# Patient Record
Sex: MALE | Race: WHITE
[De-identification: names, ages, dates, MRNs, and addresses within clinical notes are randomized per-mention and may not be internally consistent; named-entity substitution may affect disease eponyms.]

---

## 2017-08-28 ENCOUNTER — HOSPITAL ENCOUNTER (OUTPATIENT)
Dept: HOSPITAL 53 - M LAB REF | Age: 47
End: 2017-08-28
Attending: PHYSICIAN ASSISTANT
Payer: COMMERCIAL

## 2017-08-28 DIAGNOSIS — L02.412: Primary | ICD-10-CM

## 2017-09-09 ENCOUNTER — HOSPITAL ENCOUNTER (EMERGENCY)
Dept: HOSPITAL 53 - M ED | Age: 47
Discharge: HOME | End: 2017-09-09
Payer: OTHER GOVERNMENT

## 2017-09-09 VITALS — SYSTOLIC BLOOD PRESSURE: 101 MMHG | DIASTOLIC BLOOD PRESSURE: 52 MMHG

## 2017-09-09 VITALS — HEIGHT: 72 IN | WEIGHT: 315 LBS | BODY MASS INDEX: 42.66 KG/M2

## 2017-09-09 VITALS — OXYGEN SATURATION: 99 %

## 2017-09-09 DIAGNOSIS — J45.901: Primary | ICD-10-CM

## 2017-09-09 LAB
ANION GAP SERPL CALC-SCNC: 7 MEQ/L (ref 8–16)
BASOPHILS # BLD AUTO: 0 K/MM3 (ref 0–0.2)
BASOPHILS NFR BLD AUTO: 0.6 % (ref 0–1)
BUN SERPL-MCNC: 16 MG/DL (ref 7–18)
CALCIUM SERPL-MCNC: 8.3 MG/DL (ref 8.5–10.1)
CHLORIDE SERPL-SCNC: 111 MEQ/L (ref 98–107)
CO2 SERPL-SCNC: 27 MEQ/L (ref 21–32)
CREAT SERPL-MCNC: 1.06 MG/DL (ref 0.7–1.3)
EOSINOPHIL # BLD AUTO: 0.3 K/MM3 (ref 0–0.5)
EOSINOPHIL NFR BLD AUTO: 5.4 % (ref 0–3)
ERYTHROCYTE [DISTWIDTH] IN BLOOD BY AUTOMATED COUNT: 13.3 % (ref 11.5–14.5)
GFR SERPL CREATININE-BSD FRML MDRD: > 60 ML/MIN/{1.73_M2} (ref 60–?)
GLUCOSE SERPL-MCNC: 84 MG/DL (ref 70–105)
LARGE UNSTAINED CELL #: 0.2 K/MM3 (ref 0–0.4)
LARGE UNSTAINED CELL %: 2.6 % (ref 0–4)
LYMPHOCYTES # BLD AUTO: 1.4 K/MM3 (ref 1.5–4.5)
LYMPHOCYTES NFR BLD AUTO: 21 % (ref 24–44)
MCH RBC QN AUTO: 30.4 PG (ref 27–33)
MCHC RBC AUTO-ENTMCNC: 32.1 G/DL (ref 32–36.5)
MCV RBC AUTO: 94.4 FL (ref 80–96)
MONOCYTES # BLD AUTO: 0.4 K/MM3 (ref 0–0.8)
MONOCYTES NFR BLD AUTO: 6.4 % (ref 0–5)
NEUTROPHILS # BLD AUTO: 3.7 K/MM3 (ref 1.8–7.7)
NEUTROPHILS NFR BLD AUTO: 64.1 % (ref 36–66)
PLATELET # BLD AUTO: 271 K/MM3 (ref 150–450)
POTASSIUM SERPL-SCNC: 3.8 MEQ/L (ref 3.5–5.1)
SODIUM SERPL-SCNC: 145 MEQ/L (ref 136–145)
WBC # BLD AUTO: 5.7 K/MM3 (ref 4–10)

## 2017-09-09 PROCEDURE — 94640 AIRWAY INHALATION TREATMENT: CPT

## 2017-09-09 PROCEDURE — 96374 THER/PROPH/DIAG INJ IV PUSH: CPT

## 2017-09-09 PROCEDURE — 93041 RHYTHM ECG TRACING: CPT

## 2017-09-09 PROCEDURE — 99285 EMERGENCY DEPT VISIT HI MDM: CPT

## 2017-09-09 PROCEDURE — 85025 COMPLETE CBC W/AUTO DIFF WBC: CPT

## 2017-09-09 PROCEDURE — 94760 N-INVAS EAR/PLS OXIMETRY 1: CPT

## 2017-09-09 PROCEDURE — 71020: CPT

## 2017-09-09 PROCEDURE — 80048 BASIC METABOLIC PNL TOTAL CA: CPT

## 2017-09-09 RX ADMIN — IPRATROPIUM BROMIDE AND ALBUTEROL SULFATE PRN ML: .5; 3 SOLUTION RESPIRATORY (INHALATION) at 12:24

## 2017-09-09 RX ADMIN — IPRATROPIUM BROMIDE AND ALBUTEROL SULFATE PRN ML: .5; 3 SOLUTION RESPIRATORY (INHALATION) at 12:13

## 2017-09-09 RX ADMIN — IPRATROPIUM BROMIDE AND ALBUTEROL SULFATE PRN ML: .5; 3 SOLUTION RESPIRATORY (INHALATION) at 13:15

## 2017-09-09 NOTE — REP
Clinical:  Dyspnea and cough .

 

Comparison: 06/16/2017 .

 

Technique:  PA and lateral.

 

Findings:

The mediastinum and cardiac silhouette are normal.  The lung fields are clear and

without acute consolidation, effusion, or pneumothorax.  The skeletal structures

are intact and normal.

 

Impression:

1.   No acute cardiopulmonary process.

 

 

Signed by

Sami Hartman MD 09/09/2017 12:13 P

## 2017-09-19 ENCOUNTER — HOSPITAL ENCOUNTER (OUTPATIENT)
Dept: HOSPITAL 53 - M WUC | Age: 47
End: 2017-09-19
Attending: PHYSICIAN ASSISTANT
Payer: MEDICARE

## 2017-09-19 DIAGNOSIS — D50.9: ICD-10-CM

## 2017-09-19 DIAGNOSIS — R42: Primary | ICD-10-CM

## 2017-09-19 DIAGNOSIS — Z79.899: ICD-10-CM

## 2017-09-19 LAB
ALBUMIN SERPL BCG-MCNC: 3.3 GM/DL (ref 3.2–5.2)
ALBUMIN/GLOB SERPL: 1.22 {RATIO} (ref 1–1.93)
ALP SERPL-CCNC: 69 U/L (ref 45–117)
ALT SERPL W P-5'-P-CCNC: 15 U/L (ref 12–78)
ANION GAP SERPL CALC-SCNC: 7 MEQ/L (ref 8–16)
AST SERPL-CCNC: 17 U/L (ref 15–37)
BASOPHILS # BLD AUTO: 0 K/MM3 (ref 0–0.2)
BASOPHILS NFR BLD AUTO: 0.6 % (ref 0–1)
BILIRUB SERPL-MCNC: 0.4 MG/DL (ref 0.2–1)
BUN SERPL-MCNC: 22 MG/DL (ref 7–18)
CALCIUM SERPL-MCNC: 8.7 MG/DL (ref 8.5–10.1)
CHLORIDE SERPL-SCNC: 110 MEQ/L (ref 98–107)
CO2 SERPL-SCNC: 27 MEQ/L (ref 21–32)
CREAT SERPL-MCNC: 1.26 MG/DL (ref 0.7–1.3)
EOSINOPHIL # BLD AUTO: 0.2 K/MM3 (ref 0–0.5)
EOSINOPHIL NFR BLD AUTO: 4.1 % (ref 0–3)
ERYTHROCYTE [DISTWIDTH] IN BLOOD BY AUTOMATED COUNT: 13 % (ref 11.5–14.5)
FERRITIN SERPL-MCNC: 28 NG/ML (ref 26–388)
GFR SERPL CREATININE-BSD FRML MDRD: > 60 ML/MIN/{1.73_M2} (ref 60–?)
GLUCOSE SERPL-MCNC: 73 MG/DL (ref 70–105)
LYMPHOCYTES # BLD AUTO: 1.6 K/MM3 (ref 1.5–4.5)
LYMPHOCYTES NFR BLD AUTO: 27.7 % (ref 24–44)
MCH RBC QN AUTO: 31.1 PG (ref 27–33)
MCHC RBC AUTO-ENTMCNC: 33.9 G/DL (ref 32–36.5)
MCV RBC AUTO: 92 FL (ref 80–96)
MONOCYTES # BLD AUTO: 0.4 K/MM3 (ref 0–0.8)
MONOCYTES NFR BLD AUTO: 6.4 % (ref 0–5)
NEUTROPHILS # BLD AUTO: 3.3 K/MM3 (ref 1.8–7.7)
NEUTROPHILS NFR BLD AUTO: 58.9 % (ref 36–66)
PLATELET # BLD AUTO: 315 K/MM3 (ref 150–450)
POTASSIUM SERPL-SCNC: 4.8 MEQ/L (ref 3.5–5.1)
PROT SERPL-MCNC: 6 GM/DL (ref 6.4–8.2)
SODIUM SERPL-SCNC: 144 MEQ/L (ref 136–145)
T4 SERPL-MCNC: 5.6 UG/DL (ref 4.5–12)
WBC # BLD AUTO: 5.6 K/MM3 (ref 4–10)

## 2017-09-22 ENCOUNTER — HOSPITAL ENCOUNTER (OUTPATIENT)
Dept: HOSPITAL 53 - M RAD | Age: 47
End: 2017-09-22
Attending: PHYSICIAN ASSISTANT
Payer: MEDICARE

## 2017-09-22 DIAGNOSIS — E03.9: Primary | ICD-10-CM

## 2017-09-22 NOTE — REP
THYROID ULTRASOUND:

 

Real-time sonographic evaluation of the thyroid performed.  Right lobe measures

5.2 x 1.9 x 2.6 cm and left lobe 4.7 x 1.6 x 1.6 cm.  Echotexture is diffusely

heterogeneous.  A solid nodule in the mid right lobe measures 6 x 7 x 5 mm.  No

other cystic or solid nodule is seen.

 

 

Signed by

Quoc Contreras MD 09/22/2017 02:24 P

## 2017-10-19 ENCOUNTER — HOSPITAL ENCOUNTER (OUTPATIENT)
Dept: HOSPITAL 53 - M WUC | Age: 47
End: 2017-10-19
Attending: PHYSICIAN ASSISTANT
Payer: MEDICARE

## 2017-10-19 DIAGNOSIS — E03.9: Primary | ICD-10-CM

## 2017-10-19 LAB
T3RU NFR SERPL: 32 % (ref 33–40)
T4 SERPL-MCNC: 7.4 UG/DL (ref 4.5–12)

## 2017-12-18 ENCOUNTER — HOSPITAL ENCOUNTER (EMERGENCY)
Dept: HOSPITAL 53 - M ED | Age: 47
Discharge: HOME | End: 2017-12-18
Payer: OTHER GOVERNMENT

## 2017-12-18 ENCOUNTER — HOSPITAL ENCOUNTER (OUTPATIENT)
Dept: HOSPITAL 53 - M LAB | Age: 47
End: 2017-12-18
Attending: NURSE PRACTITIONER
Payer: MEDICAID

## 2017-12-18 VITALS — WEIGHT: 315 LBS | BODY MASS INDEX: 44.1 KG/M2 | HEIGHT: 71 IN

## 2017-12-18 VITALS — DIASTOLIC BLOOD PRESSURE: 67 MMHG | SYSTOLIC BLOOD PRESSURE: 123 MMHG

## 2017-12-18 DIAGNOSIS — M54.5: ICD-10-CM

## 2017-12-18 DIAGNOSIS — Z98.84: ICD-10-CM

## 2017-12-18 DIAGNOSIS — Z79.51: ICD-10-CM

## 2017-12-18 DIAGNOSIS — Z79.899: ICD-10-CM

## 2017-12-18 DIAGNOSIS — E03.9: ICD-10-CM

## 2017-12-18 DIAGNOSIS — E03.9: Primary | ICD-10-CM

## 2017-12-18 DIAGNOSIS — J45.901: Primary | ICD-10-CM

## 2017-12-18 LAB
ANION GAP SERPL CALC-SCNC: 5 MEQ/L (ref 8–16)
BASOPHILS # BLD AUTO: 0 10^3/UL (ref 0–0.2)
BASOPHILS NFR BLD AUTO: 0.6 % (ref 0–1)
BUN SERPL-MCNC: 24 MG/DL (ref 7–18)
CALCIUM SERPL-MCNC: 8.4 MG/DL (ref 8.5–10.1)
CHLORIDE SERPL-SCNC: 109 MEQ/L (ref 98–107)
CO2 SERPL-SCNC: 29 MEQ/L (ref 21–32)
CREAT SERPL-MCNC: 0.94 MG/DL (ref 0.7–1.3)
EOSINOPHIL # BLD AUTO: 0.1 10^3/UL (ref 0–0.5)
EOSINOPHIL NFR BLD AUTO: 2.3 % (ref 0–3)
ERYTHROCYTE [DISTWIDTH] IN BLOOD BY AUTOMATED COUNT: 13.1 % (ref 11.5–14.5)
GFR SERPL CREATININE-BSD FRML MDRD: > 60 ML/MIN/{1.73_M2} (ref 60–?)
GLUCOSE SERPL-MCNC: 96 MG/DL (ref 70–105)
IMM GRANULOCYTES NFR BLD: 0.4 % (ref 0–0)
LYMPHOCYTES # BLD AUTO: 1.4 10^3/UL (ref 1.5–4.5)
LYMPHOCYTES NFR BLD AUTO: 28 % (ref 24–44)
MCH RBC QN AUTO: 30 PG (ref 27–33)
MCHC RBC AUTO-ENTMCNC: 31.9 G/DL (ref 32–36.5)
MCV RBC AUTO: 93.8 FL (ref 80–96)
MONOCYTES # BLD AUTO: 0.3 10^3/UL (ref 0–0.8)
MONOCYTES NFR BLD AUTO: 5.8 % (ref 0–5)
NEUTROPHILS # BLD AUTO: 3.1 10^3/UL (ref 1.8–7.7)
NEUTROPHILS NFR BLD AUTO: 62.9 % (ref 36–66)
NRBC BLD AUTO-RTO: 0 % (ref 0–0)
PLATELET # BLD AUTO: 267 10^3/UL (ref 150–450)
POTASSIUM SERPL-SCNC: 4 MEQ/L (ref 3.5–5.1)
SODIUM SERPL-SCNC: 143 MEQ/L (ref 136–145)
T4 FREE SERPL-MCNC: 1.03 NG/DL (ref 0.76–1.46)
WBC # BLD AUTO: 4.9 10^3/UL (ref 4–10)

## 2018-04-24 ENCOUNTER — HOSPITAL ENCOUNTER (OUTPATIENT)
Dept: HOSPITAL 53 - M WUC | Age: 48
End: 2018-04-24
Attending: INTERNAL MEDICINE
Payer: OTHER GOVERNMENT

## 2018-04-24 DIAGNOSIS — J45.40: Primary | ICD-10-CM

## 2018-04-24 LAB
BASO #: 0 10^3/UL (ref 0–0.2)
BASO %: 0.4 % (ref 0–1)
EOS #: 0.2 10^3/UL (ref 0–0.5)
EOSINOPHIL NFR BLD AUTO: 3.6 % (ref 0–3)
HEMATOCRIT: 41.3 % (ref 42–52)
HEMOGLOBIN: 13.3 G/DL (ref 13.5–17.5)
IMMATURE GRANULOCYTE %: 0 % (ref 0–3)
IMMUNOGLOBULIN E: 20.3 IU/ML (ref ?–100)
LYMPH #: 1.7 10^3/UL (ref 1.5–4.5)
LYMPH %: 33.7 % (ref 24–44)
MEAN CORPUSCULAR HEMOGLOBIN: 30 PG (ref 27–33)
MEAN CORPUSCULAR HGB CONC: 32.2 G/DL (ref 32–36.5)
MEAN CORPUSCULAR VOLUME: 93 FL (ref 80–96)
MONO #: 0.4 10^3/UL (ref 0–0.8)
MONO %: 7.9 % (ref 0–5)
NEUTROPHILS #: 2.7 10^3/UL (ref 1.8–7.7)
NEUTROPHILS %: 54.4 % (ref 36–66)
NRBC BLD AUTO-RTO: 0 % (ref 0–0)
PLATELET COUNT, AUTOMATED: 260 10^3/UL (ref 150–450)
RED BLOOD COUNT: 4.44 10^6/UL (ref 4.3–6.1)
RED CELL DISTRIBUTION WIDTH: 13.3 % (ref 11.5–14.5)
WHITE BLOOD COUNT: 5 10^3/UL (ref 4–10)

## 2018-04-24 PROCEDURE — 82785 ASSAY OF IGE: CPT

## 2018-10-31 ENCOUNTER — HOSPITAL ENCOUNTER (EMERGENCY)
Dept: HOSPITAL 53 - M ED | Age: 48
Discharge: HOME | End: 2018-10-31
Payer: MEDICARE

## 2018-10-31 DIAGNOSIS — Z98.0: ICD-10-CM

## 2018-10-31 DIAGNOSIS — Z87.891: ICD-10-CM

## 2018-10-31 DIAGNOSIS — Z86.73: ICD-10-CM

## 2018-10-31 DIAGNOSIS — Z87.01: ICD-10-CM

## 2018-10-31 DIAGNOSIS — J45.901: Primary | ICD-10-CM

## 2018-10-31 LAB
VENOUS BASE EXCESS: 0.2 (ref -2–2)
VENOUS HCO3: 25.9 MEQ/L (ref 23–27)
VENOUS O2 SATURATION: 82.6 % (ref 60–80)
VENOUS PARTIAL PRESSURE CO2: 45.8 MMHG (ref 38–50)
VENOUS PARTIAL PRESSURE O2: 46.4 MMHG (ref 30–50)
VENOUS PH: 7.37 UNITS (ref 7.33–7.43)
VENOUS STANDARD HCO3: 24.3 MEQ/L
VENOUS TOTAL CO2: 27.3 MEQ/L (ref 24–28)

## 2018-10-31 RX ADMIN — IPRATROPIUM BROMIDE AND ALBUTEROL SULFATE 1 ML: .5; 3 SOLUTION RESPIRATORY (INHALATION) at 19:01

## 2018-10-31 RX ADMIN — IPRATROPIUM BROMIDE AND ALBUTEROL SULFATE 1 ML: .5; 3 SOLUTION RESPIRATORY (INHALATION) at 19:35

## 2018-10-31 RX ADMIN — METHYLPREDNISOLONE SODIUM SUCCINATE 1 MG: 125 INJECTION, POWDER, FOR SOLUTION INTRAMUSCULAR; INTRAVENOUS at 19:38

## 2018-10-31 RX ADMIN — IPRATROPIUM BROMIDE AND ALBUTEROL SULFATE 1 ML: .5; 3 SOLUTION RESPIRATORY (INHALATION) at 18:50

## 2020-10-28 ENCOUNTER — HOSPITAL ENCOUNTER (OUTPATIENT)
Dept: HOSPITAL 53 - M LAB REF | Age: 50
End: 2020-10-28
Attending: PHYSICIAN ASSISTANT
Payer: OTHER GOVERNMENT

## 2020-10-28 DIAGNOSIS — J45.50: Primary | ICD-10-CM

## 2020-10-28 LAB
BASOPHILS # BLD AUTO: 0 10^3/UL (ref 0–0.2)
BASOPHILS NFR BLD AUTO: 0.5 % (ref 0–1)
EOSINOPHIL # BLD AUTO: 0.1 10^3/UL (ref 0–0.5)
EOSINOPHIL NFR BLD AUTO: 1.4 % (ref 0–3)
HCT VFR BLD AUTO: 41.4 % (ref 42–52)
HGB BLD-MCNC: 12.9 G/DL (ref 13.5–17.5)
LYMPHOCYTES # BLD AUTO: 2.8 10^3/UL (ref 1.5–5)
LYMPHOCYTES NFR BLD AUTO: 43.5 % (ref 24–44)
MCH RBC QN AUTO: 28.9 PG (ref 27–33)
MCHC RBC AUTO-ENTMCNC: 31.2 G/DL (ref 32–36.5)
MCV RBC AUTO: 92.8 FL (ref 80–96)
MONOCYTES # BLD AUTO: 0.5 10^3/UL (ref 0–0.8)
MONOCYTES NFR BLD AUTO: 7.7 % (ref 0–5)
NEUTROPHILS # BLD AUTO: 3 10^3/UL (ref 1.5–8.5)
NEUTROPHILS NFR BLD AUTO: 46.6 % (ref 36–66)
PLATELET # BLD AUTO: 269 10^3/UL (ref 150–450)
RBC # BLD AUTO: 4.46 10^6/UL (ref 4.3–6.1)
WBC # BLD AUTO: 6.5 10^3/UL (ref 4–10)

## 2020-10-30 ENCOUNTER — HOSPITAL ENCOUNTER (OUTPATIENT)
Dept: HOSPITAL 53 - M LAB | Age: 50
End: 2020-10-30
Attending: PHYSICIAN ASSISTANT
Payer: OTHER GOVERNMENT

## 2020-10-30 DIAGNOSIS — J45.51: Primary | ICD-10-CM

## 2020-10-30 NOTE — REP
INDICATION:

SEVERE PERSISTENT ASTHMA W/ACUTE EXACERBATION.



COMPARISON:

08/31/2018.  The latest prior.



FINDINGS:

The superior mediastinal structures are midline.  The cardiac silhouette is

unremarkable in size, shape, and position.  The diaphragmatic surfaces of the lungs

are regular, and the costophrenic angles are clear.  The pulmonary fields are clear.

The imaged osseous structures are intact.





IMPRESSION:

There is no acute cardiopulmonary disease.





<Electronically signed by Jerson Andrade > 10/30/20 [id number]

## 2020-11-01 LAB
A FUMIGATUS IGE QN: < 0.1 KU/L
ANNUAL BLUE GRASS IGE QN: < 0.1 KU/L
C HERBARUM IGE QN: < 0.1 KU/L
CAT DANDER IGE QN: 0.74 KU/L
COMMON RAGWEED IGE QN: < 0.1 KU/L
ENGL PLANTAIN IGE QN: < 0.1 KU/L
MT JUNIPER IGE QN: < 0.1 KU/L
RED OAK IGE QN: < 0.1 KU/L
WATER HEMP IGE QN: < 0.1 KU/L
WHITE ASH IGE QN: < 0.1 KU/L
WHITE ELM IGE QN: < 0.1 KU/L
WHITE HICKORY IGE QN: < 0.1 KU/L

## 2021-05-06 ENCOUNTER — HOSPITAL ENCOUNTER (OUTPATIENT)
Dept: HOSPITAL 53 - M LAB REF | Age: 51
End: 2021-05-06
Attending: INTERNAL MEDICINE
Payer: OTHER GOVERNMENT

## 2021-05-06 DIAGNOSIS — J45.50: Primary | ICD-10-CM

## 2021-05-24 ENCOUNTER — HOSPITAL ENCOUNTER (OUTPATIENT)
Dept: HOSPITAL 53 - M RAD | Age: 51
End: 2021-05-24
Attending: INTERNAL MEDICINE
Payer: MEDICARE

## 2021-05-24 DIAGNOSIS — J45.50: Primary | ICD-10-CM

## 2021-05-24 DIAGNOSIS — K76.89: ICD-10-CM

## 2021-05-24 NOTE — REPVR
PROCEDURE INFORMATION: 

Exam: CT Chest Without Contrast; Diagnostic 

Exam date and time: 5/24/2021 3:31 PM 

Age: 50 years old 

Clinical indication: Other: Asthma 



TECHNIQUE: 

Imaging protocol: Diagnostic computed tomography of the chest without contrast. 

Axial, coronal and sagittal reformatted images were created and reviewed. 

3D rendering (Not supervised by radiologist): MIP and/or 3D reconstructed 

images were created by the technologist. 

Radiation optimization: All CT scans at this facility use at least one of these 

dose optimization techniques: automated exposure control; mA and/or kV 

adjustment per patient size (includes targeted exams where dose is matched to 

clinical indication); or iterative reconstruction. 



COMPARISON: 

CT ANGIO CHEST 3/12/2017 6:39 PM 



FINDINGS: 

Lungs: Unremarkable. No consolidation. No mass. 

Pleural spaces: Unremarkable. No pneumothorax. No pleural effusion. 

Heart: Unremarkable. No cardiomegaly. No pericardial effusion. 

Aorta: Unremarkable. No aneurysm. 

Lymph nodes: No pathologically enlarged lymph nodes. 



Liver: Scattered hepatic cysts, measuring up to 1.6 cm in the left hepatic 

lobe. 

Stomach and bowel: Status post gastric bypass. 

Bones/joints: No acute osseous abnormality.  Degenerative changes.

Soft tissues: Unremarkable. 



IMPRESSION: 

1. No CT evidence of acute intrathoracic pathology. 

2. Additional findings, as above. 



Electronically signed by: Richard Haynes On 05/24/2021  16:02:58 PM

## 2021-06-08 ENCOUNTER — HOSPITAL ENCOUNTER (OUTPATIENT)
Dept: HOSPITAL 53 - M LAB REF | Age: 51
End: 2021-06-08
Attending: INTERNAL MEDICINE
Payer: MEDICARE

## 2021-06-08 DIAGNOSIS — J45.50: Primary | ICD-10-CM

## 2021-06-08 LAB
BASOPHILS # BLD AUTO: 0 10^3/UL (ref 0–0.2)
BASOPHILS NFR BLD AUTO: 0.6 % (ref 0–1)
EOSINOPHIL # BLD AUTO: 0.2 10^3/UL (ref 0–0.5)
EOSINOPHIL NFR BLD AUTO: 3.1 % (ref 0–3)
HCT VFR BLD AUTO: 42.2 % (ref 42–52)
HGB BLD-MCNC: 13.2 G/DL (ref 13.5–17.5)
LYMPHOCYTES # BLD AUTO: 1.5 10^3/UL (ref 1.5–5)
LYMPHOCYTES NFR BLD AUTO: 30 % (ref 24–44)
MCH RBC QN AUTO: 28.6 PG (ref 27–33)
MCHC RBC AUTO-ENTMCNC: 31.3 G/DL (ref 32–36.5)
MCV RBC AUTO: 91.5 FL (ref 80–96)
MONOCYTES # BLD AUTO: 0.4 10^3/UL (ref 0–0.8)
MONOCYTES NFR BLD AUTO: 8.7 % (ref 2–8)
NEUTROPHILS # BLD AUTO: 2.8 10^3/UL (ref 1.5–8.5)
NEUTROPHILS NFR BLD AUTO: 57.4 % (ref 36–66)
PLATELET # BLD AUTO: 255 10^3/UL (ref 150–450)
RBC # BLD AUTO: 4.61 10^6/UL (ref 4.3–6.1)
WBC # BLD AUTO: 4.8 10^3/UL (ref 4–10)

## 2021-11-11 ENCOUNTER — HOSPITAL ENCOUNTER (EMERGENCY)
Dept: HOSPITAL 53 - M ED | Age: 51
Discharge: HOME | End: 2021-11-11
Payer: MEDICARE

## 2021-11-11 ENCOUNTER — HOSPITAL ENCOUNTER (OUTPATIENT)
Dept: HOSPITAL 53 - M SFHCPLAZ | Age: 51
End: 2021-11-11
Attending: PHYSICIAN ASSISTANT
Payer: MEDICARE

## 2021-11-11 VITALS — BODY MASS INDEX: 42.66 KG/M2 | HEIGHT: 72 IN | WEIGHT: 315 LBS

## 2021-11-11 VITALS — DIASTOLIC BLOOD PRESSURE: 55 MMHG | SYSTOLIC BLOOD PRESSURE: 105 MMHG

## 2021-11-11 VITALS — OXYGEN SATURATION: 96 %

## 2021-11-11 DIAGNOSIS — Z87.891: ICD-10-CM

## 2021-11-11 DIAGNOSIS — K21.9: ICD-10-CM

## 2021-11-11 DIAGNOSIS — R05.9: ICD-10-CM

## 2021-11-11 DIAGNOSIS — J45.901: Primary | ICD-10-CM

## 2021-11-11 DIAGNOSIS — Z79.899: ICD-10-CM

## 2021-11-11 DIAGNOSIS — R05.9: Primary | ICD-10-CM

## 2021-11-11 LAB
ALBUMIN SERPL BCG-MCNC: 3.5 GM/DL (ref 3.2–5.2)
ALT SERPL W P-5'-P-CCNC: 15 U/L (ref 12–78)
BASOPHILS # BLD AUTO: 0 10^3/UL (ref 0–0.2)
BASOPHILS NFR BLD AUTO: 0.3 % (ref 0–1)
BILIRUB CONJ SERPL-MCNC: 0.1 MG/DL (ref 0–0.2)
BILIRUB SERPL-MCNC: 0.5 MG/DL (ref 0.2–1)
EOSINOPHIL # BLD AUTO: 0 10^3/UL (ref 0–0.5)
EOSINOPHIL NFR BLD AUTO: 0.1 % (ref 0–3)
HCT VFR BLD AUTO: 41.1 % (ref 42–52)
HGB BLD-MCNC: 13 G/DL (ref 13.5–17.5)
LYMPHOCYTES # BLD AUTO: 1.2 10^3/UL (ref 1.5–5)
LYMPHOCYTES NFR BLD AUTO: 16.3 % (ref 24–44)
MCH RBC QN AUTO: 28.4 PG (ref 27–33)
MCHC RBC AUTO-ENTMCNC: 31.6 G/DL (ref 32–36.5)
MCV RBC AUTO: 89.7 FL (ref 80–96)
MONOCYTES # BLD AUTO: 0.4 10^3/UL (ref 0–0.8)
MONOCYTES NFR BLD AUTO: 5 % (ref 2–8)
NEUTROPHILS # BLD AUTO: 5.5 10^3/UL (ref 1.5–8.5)
NEUTROPHILS NFR BLD AUTO: 78 % (ref 36–66)
NT-PRO BNP: 130 PG/ML (ref ?–125)
PLATELET # BLD AUTO: 279 10^3/UL (ref 150–450)
PROT SERPL-MCNC: 6.5 GM/DL (ref 6.4–8.2)
RBC # BLD AUTO: 4.58 10^6/UL (ref 4.3–6.1)
T4 SERPL-MCNC: 8.5 UG/DL (ref 4.5–12)
TSH SERPL DL<=0.005 MIU/L-ACNC: 0.74 UIU/ML (ref 0.36–3.74)
WBC # BLD AUTO: 7 10^3/UL (ref 4–10)

## 2021-11-11 PROCEDURE — 83605 ASSAY OF LACTIC ACID: CPT

## 2021-11-11 PROCEDURE — 99285 EMERGENCY DEPT VISIT HI MDM: CPT

## 2021-11-11 PROCEDURE — 71045 X-RAY EXAM CHEST 1 VIEW: CPT

## 2021-11-11 PROCEDURE — 85025 COMPLETE CBC W/AUTO DIFF WBC: CPT

## 2021-11-11 PROCEDURE — 83880 ASSAY OF NATRIURETIC PEPTIDE: CPT

## 2021-11-11 PROCEDURE — 71275 CT ANGIOGRAPHY CHEST: CPT

## 2021-11-11 PROCEDURE — 84443 ASSAY THYROID STIM HORMONE: CPT

## 2021-11-11 PROCEDURE — 80047 BASIC METABLC PNL IONIZED CA: CPT

## 2021-11-11 PROCEDURE — 96372 THER/PROPH/DIAG INJ SC/IM: CPT

## 2021-11-11 PROCEDURE — 93041 RHYTHM ECG TRACING: CPT

## 2021-11-11 PROCEDURE — 84436 ASSAY OF TOTAL THYROXINE: CPT

## 2021-11-11 PROCEDURE — 94760 N-INVAS EAR/PLS OXIMETRY 1: CPT

## 2021-11-11 PROCEDURE — 87426 SARSCOV CORONAVIRUS AG IA: CPT

## 2021-11-11 PROCEDURE — 84484 ASSAY OF TROPONIN QUANT: CPT

## 2021-11-11 PROCEDURE — 93005 ELECTROCARDIOGRAM TRACING: CPT

## 2021-11-11 PROCEDURE — 87798 DETECT AGENT NOS DNA AMP: CPT

## 2021-11-11 PROCEDURE — 80076 HEPATIC FUNCTION PANEL: CPT

## 2021-11-11 RX ADMIN — IPRATROPIUM BROMIDE AND ALBUTEROL SULFATE SCH ML: .5; 3 SOLUTION RESPIRATORY (INHALATION) at 18:40

## 2021-11-11 RX ADMIN — IPRATROPIUM BROMIDE AND ALBUTEROL SULFATE SCH ML: .5; 3 SOLUTION RESPIRATORY (INHALATION) at 17:10

## 2021-11-11 RX ADMIN — IPRATROPIUM BROMIDE AND ALBUTEROL SULFATE SCH ML: .5; 3 SOLUTION RESPIRATORY (INHALATION) at 17:30

## 2021-11-11 NOTE — CCD
Continuity of Care Document (CCD)

                             Created on: 2021



Sami Claros

External Reference #: [id number]

: 1970

Sex: Male



Demographics





                          Address                   [address] [place] [address]  [address]

 

                          Home Phone                [phone]

 

                          Preferred Language        Unknown

 

                          Marital Status            

 

                          Jain Affiliation     Unknown

 

                          Race                      White

 

                          Ethnic Group              Not  or 





Author





                          Author                    Sami ALEXIS D.O.

 

                          Organization              Unknown

 

                          [place]  [address]



 

                          Phone                     [phone]







Care Team Providers





                    Care Team Member Name Role                Phone

 

                    Katy Jones AUTM                [phone]

 

                    [facility] Dept - [facility] AUTM                [phone]

 

                          AUTM                      Unavailable







Problems





                    Active Problems     Provider            Date

 

                    Uncomplicated severe persistent asthma MAYITO Cain    O

nset: 10/28/2020







Social History





                Type            Date            Description     Comments

 

                Birth Sex                       Unknown          

 

                Cigarette Use               Pack Years -  05  

 

                Tobacco Use     Start: 89 End: 94 Patient is a forme

r smoker hx:<1ppd 

since age 19 

 

                Smoking Status  Reviewed: 21 Patient is a former smoker hx

:<1ppd since age 

19 







Allergies, Adverse Reactions, Alerts





                                        Description

 

                                        No Known Drug Allergies







Medications





           Active Medications SIG        Qnty       Indications Ordering Provide

r Date

 

                          Hood-24                     200mg Caps ER 24HR        

           1 by mouth 

every day       30caps                          MARIANN DuongO. 2021

 

                          Prednisone                     10mg Tablets           

        1 by mouth every 

day             30tabs          J45.50          Alfonso Alexis D.O. 2021

 

                          Azithromycin                     500mg Tablets        

           1 tab by mouth 

monday, wed and friday 36tabs          J45.50          MARIANN DuongOJennifer 20

21

 

                                        Asmanex Twisthaler 120 Metered Doses    

                 220mcg/Inh Aerosol     

             1 puff by mouth twice a day 1units       J45.50       DELILAH DuongO. 

2020

 

                          Spiriva Respimat                     2.5mcg/Act Aeroso

l                   2 

puffs every day 4gm                             Julissa Nicole M.D. 

 

                          Zyrtec Allergy                     10mg Capsules      

             1 tab by 

mouth every day 30caps                          Unknown         

 

                          Ventolin HFA                     108(90Base) mcg/Act A

erosol                   2

puffs every 4 hours as needed 18gm                            Julissa morris M.D. 

 

                          Symbicort                     160-4.5mcg/Act Aerosol  

                 2 puff 

twice a day     10.200gm                        Julissa Nicole M.D. 

 

                          Levothyroxine Sodium                     75mcg Tablets

                   1 tab 

by mouth every day                                 Unknown         

 

                          Omeprazole                     40mg Capsules DR       

            1 cap by mouth

every day       30caps                          Unknown         

 

                          Meclizine HCL                     25mg Tablets        

           1 by mouth as 

needed                                          Unknown         

 

                          Singulair                     10mg Tablets            

       1 tab by mouth 

every day                                       Unknown         

 

                                        Albuterol Sulfate                     (2

.5mg/3ML) 0.083% Nebulizer              

             1 vial four times a day as needed 1080ml       J45.50       MARIANN Juárez SeO. 

 

                                        History Medications

 

                          Theophylline ER                     400mg Tablets ER 2

4HR                   1 by

mouth two times a day 180tabs         J45.50          Alfonso Alexis D.O.  - 2021

 

                          Prednisone                     10mg Tablets           

        4 tabs po qd x5d, 

3 tabs po qd x5d, 2 tabs po qd x5d, 1 tab x 5 days, then 1/2 tab po x 5 days 

53tabs              J45.50              MARIANN DuongO.    2021 - 

021







Medications Administered in Office





           Medication SIG        Qnty       Indications Ordering Provider Date

 

                          Covid-19 vaccine, Unspecified                      Inj

ection                    

                                                Unknown         2021







Immunizations





             CPT Code     Status       Date         Vaccine      Lot #

 

             [id number]        Given        2020   Afluria, Quadrivalent, 0.5ml

, NDC# 03730-737-93  







Vital Signs





                Date            Vital           Result          Comment

 

                2021  2:16pm Heart Rate      60 /min          

 

                    O2 % BldC Oximetry  97 %                 

 

                    Height              71 inches           5'11"

 

                    Weight              378.00 lb            

 

                    BMI (Body Mass Index) 52.7 kg/m2           

 

                    Ideal Body Weight   172 lb               

 

                    Weight              171.461 kg           

 

                    BSA (Body Surface Area) 2.76 m2              

 

                2021 11:04am BP Systolic     110 mmHg         

 

                    BP Diastolic        70 mmHg              

 

                    Heart Rate          67 /min              

 

                    O2 % BldC Oximetry  95 %                 

 

                    Body Temperature    97.2 F             

 

                    Height              71 inches           5'11"

 

                    Weight              374.00 lb            

 

                    BMI (Body Mass Index) 52.2 kg/m2           

 

                    Ideal Body Weight   172 lb               

 

                    Weight              169.646 kg           

 

                    BSA (Body Surface Area) 2.75 m2              







Results





        Test    Acquired Date Facility Test    Result  H/L     Range   Note

 

                    FVL/[place]           2021          [facility]

             PDFReport    SEE IMAGE                               

 

             FVC-Pred     5.26 L                                  

 

             FVC-Pre      1.90 L                                  

 

             FVC-%Pred-Pre 36 L                                    

 

             FVC-LLN      4.29 L                                  

 

             Fev1-Pred    4.09 L                                  

 

             Fev1-Pre     1.41 L                                  

 

             Fev1-%Pred-Pre 34 L                                    

 

             Fev1-LLN     3.27 L                                  

 

             Fev6-Pred    5.08 L                                  

 

             Fev6-Pre     1.87 L                                  

 

             Fev6-%Pred-Pre 36 L                                    

 

             Fev6-LLN     4.14 L                                  

 

             Fev1fvc-Pred 78 %                                    

 

             Fev1fvc-Pre  74 %                                    

 

             Qwm8sbj-%Pred-Pre 95 %                                    

 

             Abj3fwe-PNR  68 %                                    

 

             Fev6fvc-Pred 97 %                                    

 

             Fev6fvc-Pre  98 %                                    

 

             Mxu2yml-%Pred-Pre 101 %                                   

 

             FEFMax-Pred  10.10 L/E/sec                            

 

             FEFMax-Pre   5.25 L/E/sec                            

 

             FEFMax-%Pred-Pre 51 L/E/sec                              

 

             FEFMax-LLN   7.71 L/E/sec                            

 

             Fef2575-Pred 3.62 L/E/sec                            

 

             Fef2575-Pre  1.01 L/E/sec                            

 

             Epm3602-%Pred-Pre 27 L/E/sec                              

 

             Gkp1611-HSA  1.97 L/E/sec                            

 

             ExpTime-Pre  7.28 sec                                

 

             Fev1fev6-Pred 81 %                                    

 

             Fev1fev6-Pre 76 %                                    

 

             Dxm7jse1-%Pred-Pre 93 %                                    

 

             Vzx0ugr3-DGU 72 %                                    

 

                    [facility], Pulmonary 2021          [facility]

nter Main Lab

                                        [address] [place], [place] [id number] [phone] Immunoglobulin E 57.4 IU/mL Normal     <100        

 

                    [facility] Zone 1 Region Allergen Panel (Inclu 2021         

 [facility] 

Main Lab

                                        [address] [place] [address]

           [phone] Class Description (SEE NOTE)  Normal     .          1

 

             Z463-NiC D pteronyssinus <0.10 kU/L   Normal       Class 0       

 

             U911-YsV D farinae Mite <0.10 kU/L   Normal       Class 0       

 

             L192-EqA Cat Epith/Dander 0.56 kU/L    Abnormal     Class II      

 

             U550-AnJ Dog Dander 1.53 kU/L    Abnormal     Class III     

 

             I311-XkV B[place] Grass < 0.10 kU/L  Normal       Class 0       

 

             D705-TfV [place] < 0.10 kU/L  Normal       Class 0      

 

 

             V727-RgL [place] Grass < 0.10 kU/L  Normal       Class 0       

 

             N663-CpR Cockroach, American < 0.10 kU/L  Normal       Class 0     

  

 

             Q838-ToP Penicillium chrysogen < 0.10 kU/L  Normal       Class 0   

    

 

             M002 IgE Cladosporium herbaru < 0.10 kU/L  Normal       Class 0    

   

 

             M003 IgE Aspergillus fumigatu < 0.10 kU/L  Normal       Class 0    

   

 

             Y546-VzG Mucor racemosus < 0.10 kU/L  Normal       Class 0       

 

             J119-MqS Alternaria alternata < 0.10 kU/L  Normal       Class 0    

   

 

             P509-ZjV Stemphylium Herbarum < 0.10 kU/L  Normal       Class 0    

   

 

             Y859-GhG Common Silver Birch < 0.10 kU/L  Normal       Class 0     

  

 

             A217-MwG Oak, White < 0.10 kU/L  Normal       Class 0       

 

             Q426-AuN Elm, American < 0.10 kU/L  Normal       Class 0       

 

             A209-LaY Deonte, White < 0.10 kU/L  Normal       Class 0       

 

             G370-SiG Maple/[place] < 0.10 kU/L  Normal       Class 0       

 

             N594-UiD Hazelnut Tree < 0.10 kU/L  Normal       Class 0       

 

             G682-SaA [place]ory, White < 0.10 kU/L  Normal       Class 0       

 

             F715-ScB White [place] < 0.10 kU/L  Normal       Class 0       

 

             S734-ShF [place], Mountain < 0.10 kU/L  Normal       Class 0       

 

             H138-NvS Ragweed, Short < 0.10 kU/L  Normal       Class 0       

 

             R458-GwQ Mugwort < 0.10 kU/L  Normal       Class 0       

 

             T909-TeL Plantain, English < 0.10 kU/L  Normal       Class 0       

 

             R767-GvJ Pigweed, Rough < 0.10 kU/L  Normal       Class 0       

 

             A076-MnJ Sheep Sorrel < 0.10 kU/L  Normal       Class 0       

 

             U982-KmN Nettle < 0.10 kU/L  Normal       Class 0      2

 

                    CBC With Differential 2021          [facility] Main Lab

                                        [address] [id number] [phone] White Blood Count 4.8 10     Normal     4.0-10.0    

 

             Red Blood Count 4.61 10      Normal       4.30-6.10     

 

             Hemoglobin   13.2 g/dL    Low          13.5-17.5     

 

             Hematocrit   42.2 %       Normal       42.0-52.0     

 

             Mean Corpuscular Volume 91.5 fl      Normal       80.0-96.0     

 

             Mean Corpuscular Hemoglobin 28.6 pg      Normal       27.0-33.0    

 

 

             Mean Corpuscular HGB Conc 31.3 g/dL    Low          32.0-36.5     

 

             Red Cell Distribution Width 14.2 %       Normal       11.5-14.5    

 

 

             Platelet Count, Automated 255 10       Normal       150-450       

 

             Neutrophils % 57.4 %       Normal       36.0-66.0     

 

             Lymph %      30.0 %       Normal       24.0-44.0     

 

             Mono %       8.7 %        High         2.0-8.0       

 

             Eos %        3.1 %        High         0.0-3.0       

 

             Baso %       0.6 %        Normal       0.0-1.0       

 

             Immature Granulocyte % 0.2 %        Normal       0-3.0         

 

             Nucleated Red Blood Cell % 0.0 %        Normal       0-0           

 

             Neutrophils # 2.8 10       Normal       1.5-8.5       

 

             Lymph #      1.5 10       Normal       1.5-5.0       

 

             Mono #       0.4 10       Normal       0.0-0.8       

 

             Eos #        0.2 10       Normal       0.0-0.5       

 

             Baso #       0.0 10       Normal       0.0-0.2       

 

                    FVL/[place]           2021          [facility]

             PDFReport    SEE IMAGE                               

 

             FVC-Pred     5.26 L                                  

 

             FVC-Pre      1.75 L                                  

 

             FVC-%Pred-Pre 33 L                                    

 

             FVC-LLN      4.29 L                                  

 

             Fev1-Pred    4.09 L                                  

 

             Fev1-Pre     1.36 L                                  

 

             Fev1-%Pred-Pre 33 L                                    

 

             Fev1-LLN     3.27 L                                  

 

             Fev6-Pred    5.08 L                                  

 

             Fev6-Pre     1.75 L                                  

 

             Fev6-%Pred-Pre 34 L                                    

 

             Fev6-LLN     4.14 L                                  

 

             Fev1fvc-Pred 78 %                                    

 

             Fev1fvc-Pre  78 %                                    

 

             Wjd6drx-%Pred-Pre 99 %                                    

 

             Qdv4num-ZMW  68 %                                    

 

             Fev6fvc-Pred 97 %                                    

 

             Fev6fvc-Pre  100 %                                   

 

             Jnl2bsl-%Pred-Pre 103 %                                   

 

             FEFMax-Pred  10.10 L/E/sec                            

 

             FEFMax-Pre   4.56 L/E/sec                            

 

             FEFMax-%Pred-Pre 45 L/E/sec                              

 

             FEFMax-LLN   7.71 L/E/sec                            

 

             Fef2575-Pred 3.62 L/E/sec                            

 

             Fef2575-Pre  1.15 L/E/sec                            

 

             Ztv3526-%Pred-Pre 31 L/E/sec                              

 

             Uhj3155-VXI  1.97 L/E/sec                            

 

             ExpTime-Pre  5.91 sec                                

 

             Fev1fev6-Pred 81 %                                    

 

             Fev1fev6-Pre 78 %                                    

 

             Pgg2klo2-%Pred-Pre 96 %                                    

 

             Uwk2dhw3-ZKD 72 %                                    

 

                    Aspergillus Antibodies 2021          [facility] Main Lab

                                        [address] [place], [place] [id number] [phone] Aspergillus Fumigatus Faith Negative   Normal     Neg:<1

:1    

 

             Aspergillus Flavus Faith Negative     Normal       Neg:<1:1      

 

             Aspergillus Niger Faith Negative     Normal       Neg:<1:1     3

 

                    [facility]VL/[place]           2021          [facility]

             PDFReport    SEE IMAGE                               

 

             FVC-Pred     5.26 L                                  

 

             FVC-Pre      1.73 L                                  

 

             FVC-%Pred-Pre 32 L                                    

 

             FVC-LLN      4.29 L                                  

 

             Fev1-Pred    4.09 L                                  

 

             Fev1-Pre     1.19 L                                  

 

             Fev1-%Pred-Pre 29 L                                    

 

             Fev1-LLN     3.27 L                                  

 

             Fev6-Pred    5.08 L                                  

 

             Fev6-Pre     1.73 L                                  

 

             Fev6-%Pred-Pre 34 L                                    

 

             Fev6-LLN     4.14 L                                  

 

             Fev1fvc-Pred 78 %                                    

 

             Fev1fvc-Pre  69 %                                    

 

             Uaw1dat-%Pred-Pre 88 %                                    

 

             Ccb5etl-TCG  68 %                                    

 

             Fev6fvc-Pred 97 %                                    

 

             Fev6fvc-Pre  100 %                                   

 

             Dsp8ymu-%Pred-Pre 103 %                                   

 

             FEFMax-Pred  10.10 L/E/sec                            

 

             FEFMax-Pre   3.07 L/E/sec                            

 

             FEFMax-%Pred-Pre 30 L/E/sec                              

 

             FEFMax-LLN   7.71 L/E/sec                            

 

             Fef2575-Pred 3.62 L/E/sec                            

 

             Fef2575-Pre  0.71 L/E/sec                            

 

             Wcy2497-%Pred-Pre 19 L/E/sec                              

 

             Vwy4822-AFO  1.97 L/E/sec                            

 

             ExpTime-Pre  6.66 sec                                

 

             Fev1fev6-Pred 81 %                                    

 

             Fev1fev6-Pre 69 %                                    

 

             Xuw9ore0-%Pred-Pre 85 %                                    

 

             Lgj9nrk9-ZFH 72 %                                    







                          1                         .

Levels of Specific IgE       Class  Description of Class

                                        ---------------------------  -----  ----

----------------

< 0.10         0         Negative

                                        0.10 -    0.31         0/I       Equivoc

al/Low

                                        0.32 -    0.55         I         Low

                                        0.56 -    1.40         II        Moderat

e

                                        1.41 -    3.90         III       High

                                        3.91 -   19.00         IV        Very Hi

gh

                                        19.01 -  100.00         V         Very H

igh

>100.00         VI        Very High



 

                          2                         Performed at:  [address]  [id number]

61

: Che Hurst MD, Phone:  [phone]



 

                          3                         Performed at:  [address]  [id number]

61

: Che Hurst MD, Phone:  [phone]









Procedures





                Date            Code            Description     Status

 

                2021      47332           Office/Outpatient Established Mo

d MDM 30-39 Min Completed

 

                2021      94999           Spirometry      Completed

 

                2021      32167           Office/Outpatient Established Mo

d MDM 30-39 Min Completed

 

                2021      55890           Spirometry      Completed

 

                2021      19481           Office/Outpatient Established Lo

w MDM 20-29 Min Completed

 

                2021      52677           Spirometry      Completed







[employer]





                                        Description

 

                                        No Information Available







Encounters





           Type       Date       Location   Provider   Dx         Diagnosis

 

             Office Visit 2021  2:00p [facility] Alfonso hunter D.OJennifer 

J45.50                                  Severe persistent asthma, uncomplicated

 

             Office Visit 2021 11:00a [facility] Alfonso hunter D.OJennifer 

J45.50                                  Severe persistent asthma, uncomplicated

 

             Office Visit 2021  1:30p [facility] Alfonso hunter DJenniferO. 

J45.50                                  Severe persistent asthma, uncomplicated







Assessments





                Date            Code            Description     Provider

 

                2021      J45.50          Severe persistent asthma, uncomp

licated Alfonso Sears, D.O.

 

                2021      J45.50          Severe persistent asthma, uncomp

licated Alfonso Sears, D.O.

 

                2021      J45.50          Severe persistent asthma, uncomp

licated Alfonso Sears, D.O.







Plan of Treatment

2021 - Alfonso Alexis D.DELIA.* J45.50 Severe persistent asthma, uncomplicated

* * Follow up:* Hood level in one week. Follow up in one month with spirometry, 
  me, not PA. Recommend Allergist referral.









Functional Status





                Functional Condition Comment         Date            Status

 

                Independent with all ADL's                                 Activ

e

 

                Independent with all IADL's                                 Acti

ve







Mental Status





                Mental Condition Comment         Date            Status

 

                Cognitive ability not impaired                                 A

ctive







Referrals





                Refer to      Reason for Referral Status          Appt Date

 

                Radiology/Procedure 08820           Closed          2021

## 2021-11-11 NOTE — CCD
Summarization Of Episode

                             Created on: 2021



TANIKA GUSMAN

External Reference #: [id number]

: 1970

Sex: Undifferentiated



Demographics





                          Address                   [address] [place]  [address]

 

                          Home Phone                [phone]

 

                          Preferred Language        English

 

                          Marital Status            Unknown

 

                          Yazidism Affiliation     Unknown

 

                          Race                      Unknown

 

                          Ethnic Group              Not  or 





Author





                          Author                    [facility]

 

                          Organization              [facility]

 

                          Address                   Unknown

 

                          Phone                     Unavailable







Support





                Name            Relationship    Address         Phone

 

                DISA            Next Of Kin     Unknown         Unavailable

 

                    DALIA SEVILLA     Next Of Kin         [address]

[place]  [address]                  [phone]

 

                UE              Next Of Kin     Unknown         Unavailable

 

                DISABILITY      Next Of Kin     Unknown         [phone]

 

                DISABLED        Next Of Kin     Unknown         Unavailable

 

                    VIDA SEVILLA Next Of Kin         [address] [place]  [address]                  [phone]

 

                    MERY  KALPANA       Next Of Kin         [address] 

[place]  [address]               [phone]

 

                    itzel gusman   [employer]                [address]

[place]  [address]              Unavailable

 

                    Dalia Sevilla     [employer]                [address]

[place]  [address]                     [phone]







Care Team Providers





                    Care Team Member Name Role                Phone

 

                    Skinny Goddard MD Unavailable         Unavailable

 

                    Skinny Goddard MD Unavailable         Unavailable

 

                    Skinny Goddard MD Unavailable         Unavailable

 

                    Skinny Goddard MD Unavailable         Unavailable

 

                    Skinny Goddard MD Unavailable         Unavailable

 

                    Skinny Goddard MD Unavailable         Unavailable

 

                    PHYSICIAN,  OTHER   Unavailable         Unavailable

 

                    SARAI THOMAS MD Unavailable         Unavailable

 

                    SARAI THOMAS MD Unavailable         Unavailable

 

                    SARAI THOMAS MD Unavailable         Unavailable

 

                    SARAI THOMAS MD Unavailable         Unavailable

 

                    SARAI THOMAS MD Unavailable         Unavailable

 

                    SARAI THOMAS MD Unavailable         Unavailable

 

                    SARAI THOMAS MD Unavailable         Unavailable

 

                    SARAI THOMAS MD Unavailable         Unavailable

 

                    SARAI THOMAS MD Unavailable         Unavailable

 

                    SARAI THOMAS MD Unavailable         Unavailable

 

                    SARAI THOMAS MD Unavailable         Unavailable

 

                    SARAI THOMAS MD Unavailable         Unavailable

 

                    SARAI THOMAS MD Unavailable         Unavailable

 

                    SARAI THOMAS MD Unavailable         Unavailable

 

                    SARAI THOMAS MD Unavailable         Unavailable

 

                    Middeldorf DO,  Mitch Unavailable         +

 

                    Middeldorf DO,  Mitch Unavailable         +

 

                    Middeldorf DO,  Mitch Unavailable         +

 

                    Middeldorf DO,  Mitch Unavailable         +

 

                    Middeldorf DO,  Mitch Unavailable         +

 

                    DR MD GUSTAVO BOND    Unavailable         Unavailable

 

                    OTHER,  PHYSICIAN REFERRING Unavailable         Unavailabl

e

 

                    HOOKS, M ARMOND PA  Unavailable         Unavailable

 

                    HOOKS, M ARMOND PA  Unavailable         Unavailable

 

                    HOOKS, M ARMOND PA  Unavailable         Unavailable

 

                    HOOKS, M ARMOND PA  Unavailable         Unavailable

 

                    HOOKS, M ARMOND PA  Unavailable         Unavailable

 

                    HOOKS, M ARMOND PA  Unavailable         Unavailable

 

                    HOOKS, M ARMOND PA  Unavailable         Unavailable

 

                    HOOKS, M ARMOND PA  Unavailable         Unavailable

 

                    HOOKS, M ARMOND PA  Unavailable         Unavailable

 

                    HOOKS, M ARMOND PA  Unavailable         Unavailable

 

                    HOOKS, M ARMOND PA  Unavailable         Unavailable

 

                    HOOKS, M ARMOND PA  Unavailable         Unavailable

 

                    HOOKS, M ARMOND PA  Unavailable         Unavailable

 

                    HOOKS, M ARMOND PA  Unavailable         Unavailable

 

                    HOOKS, M ARMOND PA  Unavailable         Unavailable

 

                    HOOKS, M ARMOND PA  Unavailable         Unavailable

 

                    HOOKS, M ARMOND PA  Unavailable         Unavailable

 

                    HOOKS, M ARMOND PA  Unavailable         Unavailable

 

                    HOOKS, M ARMOND PA  Unavailable         Unavailable

 

                    HOOKS, M ARMOND PA  Unavailable         Unavailable

 

                    HOOKS, M ARMOND PA  Unavailable         Unavailable

 

                    HOOKS, M ARMOND PA  Unavailable         Unavailable

 

                    HOOKS, M ARMOND PA  Unavailable         Unavailable

 

                    HOOKS, M ARMOND PA  Unavailable         Unavailable

 

                    HOOKS, M ARMOND PA  Unavailable         Unavailable

 

                    HOOKS, M ARMOND PA  Unavailable         Unavailable

 

                    HOOKS, M ARMOND PA  Unavailable         Unavailable

 

                    HOOKS, M ARMOND PA  Unavailable         Unavailable

 

                    HOOKS, M ARMOND PA  Unavailable         Unavailable

 

                    HOOKS, M ARMOND PA  Unavailable         Unavailable

 

                    HOOKS, M ARMOND PA  Unavailable         Unavailable

 

                    HOOKS, M ARMOND PA  Unavailable         Unavailable

 

                    HOOKS, M ARMOND PA  Unavailable         Unavailable

 

                    HOOKS, M ARMOND PA  Unavailable         Unavailable

 

                    HOOKS, M ARMOND PA  Unavailable         Unavailable

 

                    MEAGAN Blanco MD Unavailable         Unavailable

 

                    MEAGAN Blanco MD Unavailable         Unavailable

 

                    MEAGAN Blanco MD Unavailable         Unavailable

 

                    MEAGAN Blanco MD Unavailable         Unavailable

 

                    MEAGAN Blanco MD Unavailable         Unavailable

 

                    MEAGAN Blanco MD Unavailable         Unavailable

 

                    MEAGAN Blanco MD Unavailable         Unavailable

 

                    Sorge, C Alexandru MD Unavailable         Unavailable

 

                    Sorge, C Alexandru MD Unavailable         Unavailable

 

                    Sorge, C Alexandru MD Unavailable         Unavailable

 

                    Sorge, C Alexandru MD Unavailable         Unavailable

 

                    Sorge, C Alexandru MD Unavailable         Unavailable

 

                    Sorge, C Alexandru MD Unavailable         Unavailable

 

                    Sorge, C Alexandru MD Unavailable         Unavailable

 

                    Sorge, C Alexandru MD Unavailable         Unavailable

 

                    Sorge, C Alexandru MD Unavailable         Unavailable

 

                    Sorge, C Alexandru MD Unavailable         Unavailable

 

                    Sorge, C Alexandru MD Unavailable         Unavailable

 

                    Sorge, C Alexandru MD Unavailable         Unavailable

 

                    Sorge, C Alexandru MD Unavailable         Unavailable

 

                    Sorge, C Alexandru MD Unavailable         Unavailable

 

                    Sorge, C Alexandru MD Unavailable         Unavailable

 

                    Sorge, C Alexandru MD Unavailable         Unavailable

 

                    Sorge, C Alexandru MD Unavailable         Unavailable

 

                    Sorge, C Alexandru MD Unavailable         Unavailable

 

                    Sorge, C Alexandru MD Unavailable         Unavailable

 

                    SEARS, A JEREMIAH DO    Unavailable         Unavailable

 

                    SEARS, A JEREMIAH DO    Unavailable         Unavailable

 

                    SEARS, A EJREMIAH DO    Unavailable         Unavailable

 

                    SEARS, A JEREMIAH DO    Unavailable         Unavailable

 

                    SEARS, A JEREMIAH DO    Unavailable         Unavailable

 

                    SEARS, A JEREMIAH DO    Unavailable         Unavailable

 

                    SEARS, A JEREMIAH DO    Unavailable         Unavailable

 

                    SEARS, A JEREMIAH DO    Unavailable         Unavailable

 

                    SEARS, A JEREMIAH DO    Unavailable         Unavailable

 

                    SEARS, A JEREMIAH DO    Unavailable         Unavailable

 

                    SEARS, A JEREMIAH DO    Unavailable         Unavailable

 

                    SEARS, A JEREMIAH DO    Unavailable         Unavailable

 

                    SEARS, A JEREMIAH DO    Unavailable         Unavailable

 

                    SEARS, A JEREMIAH DO    Unavailable         Unavailable

 

                    SEARS, A JEREMIAH DO    Unavailable         Unavailable

 

                    SEARS, A JEREMIAH DO    Unavailable         Unavailable

 

                    SEARS, A JEREMIAH DO    Unavailable         Unavailable

 

                    SEARS, A JEREMIAH DO    Unavailable         Unavailable

 

                    SEARS, A JEREMIAH DO    Unavailable         Unavailable

 

                    SEARS, A JEREMIAH DO    Unavailable         Unavailable

 

                    SEARS, A JEREMIAH DO    Unavailable         Unavailable

 

                    SEARS, A JEREMIAH DO    Unavailable         Unavailable

 

                    SEARS, A JEREMIAH DO    Unavailable         Unavailable

 

                    SEARS, A JEREMIAH DO    Unavailable         Unavailable

 

                    SEARS, A JEREMIAH DO    Unavailable         Unavailable

 

                    SEARS, A JEREMIAH DO    Unavailable         Unavailable

 

                    SEARS, A JEREMIAH DO    Unavailable         Unavailable

 

                    SEARS, A JEREMIAH DO    Unavailable         Unavailable

 

                    SEARS, A JEREMIAH DO    Unavailable         Unavailable

 

                    SEARS, A JEREMIAH DO    Unavailable         Unavailable

 

                    SEARS, A JEREMIAH DO    Unavailable         Unavailable

 

                    SEARS, A JEREMIAH DO    Unavailable         Unavailable

 

                    SEARS, A JEREMIAH DO    Unavailable         Unavailable

 

                    SEARS, A JEREMIAH DO    Unavailable         Unavailable

 

                    SEARS, A JEREMIAH DO    Unavailable         Unavailable

 

                    SEARS, A JEREMIAH DO    Unavailable         Unavailable

 

                    SEARS, A JEREMIAH DO    Unavailable         Unavailable

 

                    SEARS, A JEREMIAH DO    Unavailable         Unavailable

 

                    SEARS, A JEREMIAH DO    Unavailable         Unavailable

 

                    SEARS, A JEREMIAH DO    Unavailable         Unavailable

 

                    SEARS, A JEREMIAH DO    Unavailable         Unavailable

 

                    SEARS, A JEREMIAH DO    Unavailable         Unavailable

 

                    SEARS, A JEREMIAH DO    Unavailable         Unavailable

 

                    SEARS, A JEREMIAH DO    Unavailable         Unavailable

 

                    SEARS, A JEREMIAH DO    Unavailable         Unavailable

 

                    SEARS, A JEREMIAH DO    Unavailable         Unavailable

 

                    SEARS, A JEREMIAH DO    Unavailable         Unavailable

 

                    SEARS, A JEREMIAH DO    Unavailable         Unavailable

 

                    MARIBETH,  GUTIERREZ PA Unavailable         Unavailable

 

                    MARIBETH,  GUTIERREZ PA Unavailable         Unavailable

 

                    MARIBETH,  GUTIERREZ PA Unavailable         Unavailable

 

                    MARIBETH,  GUTIERREZ PA Unavailable         Unavailable

 

                    MARIBETH,  GUTIERREZ PA Unavailable         Unavailable

 

                    MARIBETH,  GUTIERREZ PA Unavailable         Unavailable

 

                    MARIBETH,  GUTIERREZ PA Unavailable         Unavailable

 

                    MARIBETH,  GUTIERREZ PA Unavailable         Unavailable

 

                    MARIBETH,  GUTIERREZ PA Unavailable         Unavailable

 

                    MARIBETH,  GUTIERREZ PA Unavailable         Unavailable

 

                    MARIBETH,  GUTIERREZ PA Unavailable         Unavailable

 

                    MARIBETH,  GUTIERREZ PA Unavailable         Unavailable

 

                    MARIBETH,  GUTIERREZ PA Unavailable         Unavailable

 

                    MARIBETH,  GUTIERREZ PA Unavailable         Unavailable

 

                    Jones, C Katy PA Unavailable         Unavailable

 

                    Jones, C Katy PA Unavailable         Unavailable

 

                    Jones, C Katy PA Unavailable         Unavailable

 

                    Jones, C Katy PA Unavailable         Unavailable

 

                    Jones, C Katy PA Unavailable         Unavailable

 

                    Jones, C Katy PA Unavailable         Unavailable

 

                    Jones, C Katy PA Unavailable         Unavailable

 

                    Jones, C Katy PA Unavailable         Unavailable

 

                    Jones, C Katy PA Unavailable         Unavailable

 

                    Jones, C Katy PA Unavailable         Unavailable

 

                    Jones, C Katy PA Unavailable         Unavailable

 

                    Jones, C Katy PA Unavailable         Unavailable

 

                    Jones, C Katy PA Unavailable         Unavailable

 

                    Jones, C Katy PA Unavailable         Unavailable

 

                    Jones, C Katy PA Unavailable         Unavailable

 

                    Jones, C Katy PA Unavailable         Unavailable

 

                    Jones, C Katy PA Unavailable         Unavailable

 

                    Jones, C Katy PA Unavailable         Unavailable

 

                    Jones, C Katy PA Unavailable         Unavailable

 

                    Jones, C Katy PA Unavailable         Unavailable

 

                    Jones, C Katy PA Unavailable         Unavailable

 

                    Jones, C Katy PA Unavailable         Unavailable

 

                    Jones, C Katy PA Unavailable         Unavailable

 

                    Jones, C Katy PA Unavailable         Unavailable

 

                    Jones, C Katy PA Unavailable         Unavailable

 

                    Jones, C Katy PA Unavailable         Unavailable

 

                    Jones, C Katy PA Unavailable         Unavailable

 

                    Jones, C Katy PA Unavailable         Unavailable

 

                    Jones, C Katy PA Unavailable         Unavailable

 

                    Jones, C Katy PA Unavailable         Unavailable

 

                    Jones, C Katy PA Unavailable         Unavailable

 

                    Jones, C Katy PA Unavailable         Unavailable

 

                    Jones, C Katy PA Unavailable         Unavailable

 

                    Jones, C Katy PA Unavailable         Unavailable

 

                    Jones, C Katy PA Unavailable         Unavailable

 

                    Jones, C Katy PA Unavailable         Unavailable

 

                    Jones, C Katy PA Unavailable         Unavailable

 

                    Jones, C Katy PA Unavailable         Unavailable

 

                    Jones, C Katy PA Unavailable         Unavailable

 

                    Jones, C Katy PA Unavailable         Unavailable

 

                    Jones, C Katy PA Unavailable         Unavailable

 

                    Jones, C Katy PA Unavailable         Unavailable

 

                    Jones, C Katy PA Unavailable         Unavailable

 

                    Jones, C Katy PA Unavailable         Unavailable

 

                    Jones, C Katy PA Unavailable         Unavailable

 

                    Jones, C Katy PA Unavailable         Unavailable

 

                    Jones, C Katy PA Unavailable         Unavailable

 

                    Jones, C Katy PA Unavailable         Unavailable

 

                    Jones, C Katy PA Unavailable         Unavailable

 

                    Jones, C Katy PA Unavailable         Unavailable

 

                    Jones, C Katy PA Unavailable         Unavailable

 

                    WILLSON, K JAVI PA Unavailable         Unavailable

 

                    WILLSON, K JAVI PA Unavailable         Unavailable

 

                    WILLSON, K JAVI PA Unavailable         Unavailable

 

                    WILLSON, K JAVI PA Unavailable         Unavailable

 

                    WILLSON, K JAVI PA Unavailable         Unavailable

 

                    WILLSON, K JAVI PA Unavailable         Unavailable

 

                    WILLSON, K JAVI PA Unavailable         Unavailable

 

                    WILLSON, K JAVI PA Unavailable         Unavailable

 

                    WILLSON, K JAVI PA Unavailable         Unavailable

 

                    WILLSON, K JAVI PA Unavailable         Unavailable

 

                    WILLSON, K JAVI PA Unavailable         Unavailable

 

                    WILLSON, K JAVI PA Unavailable         Unavailable

 

                    WILLSON, K JAVI PA Unavailable         Unavailable

 

                    WILLSON, K JAVI PA Unavailable         Unavailable

 

                    WILLSON, K JAVI PA Unavailable         Unavailable

 

                    WILLSON, K JAVI PA Unavailable         Unavailable

 

                    WILLSON, K JAVI PA Unavailable         Unavailable

 

                    WILLSON, K AJVI PA Unavailable         Unavailable

 

                    WILLSON, K JAVI PA Unavailable         Unavailable

 

                    WILLSON, K JAVI PA Unavailable         Unavailable

 

                    WILLSON, K JAVI PA Unavailable         Unavailable

 

                    WILLSON, K JAVI PA Unavailable         Unavailable

 

                    WILLSON, K JAVI PA Unavailable         Unavailable

 

                    WILLSON, K JAVI PA Unavailable         Unavailable

 

                    WILLSON, K JAVI PA Unavailable         Unavailable

 

                    WILLSON, K JAVI PA Unavailable         Unavailable

 

                    WILLSON, K JAVI PA Unavailable         Unavailable

 

                    WILLSON, K JAVI PA Unavailable         Unavailable

 

                    WILLSON, K JAVI PA Unavailable         Unavailable

 

                    WILLSON, K JAVI PA Unavailable         Unavailable

 

                    WILLSON, K JAVI PA Unavailable         Unavailable

 

                    WILLSON, K JAVI PA Unavailable         Unavailable

 

                    WILLSON, K JAVI PA Unavailable         Unavailable

 

                    WILLSON, K JAVI PA Unavailable         Unavailable

 

                    WILLSON, K JAVI PA Unavailable         Unavailable

 

                    WILLSON, K JAVI PA Unavailable         Unavailable

 

                    WILLSON, K JAVI PA Unavailable         Unavailable

 

                    WILLSON, K JAVI PA Unavailable         Unavailable

 

                    WILLSON, K JAVI PA Unavailable         Unavailable

 

                    WILLSON, K JAVI PA Unavailable         Unavailable

 

                    WILLSON, K JAVI PA Unavailable         Unavailable

 

                    WILLSON, K JAVI PA Unavailable         Unavailable

 

                    WILLSON, K JAVI PA Unavailable         Unavailable

 

                    WILLSON, K JAVI PA Unavailable         Unavailable

 

                    WILLSON, K JAVI PA Unavailable         Unavailable

 

                    WILLSON, K JAVI PA Unavailable         Unavailable

 

                    WILLSON, K JAVI PA Unavailable         Unavailable

 

                    WILLSON, K JAVI PA Unavailable         Unavailable

 

                    WILLSON, K JAVI PA Unavailable         Unavailable

 

                    WILLSON, K JAVI PA Unavailable         Unavailable

 

                    WILLSON, K JAVI PA Unavailable         Unavailable

 

                    WILLSON, K JAVI PA Unavailable         Unavailable

 

                    WILLSON, K JAVI PA Unavailable         Unavailable

 

                    WILLSON, K JAVI PA Unavailable         Unavailable

 

                    Kong, E Quoc DO Unavailable         Unavailable

 

                    Kong, E Quoc DO Unavailable         Unavailable

 

                    Kong, E Quoc DO Unavailable         Unavailable

 

                    Kong, E Quoc DO Unavailable         Unavailable

 

                    Kong, E Quoc DO Unavailable         Unavailable

 

                    Kong, E Quoc DO Unavailable         Unavailable

 

                    Kong, E Quoc DO Unavailable         Unavailable

 

                    Kong, E Quoc DO Unavailable         Unavailable

 

                    Kong, E Quoc DO Unavailable         Unavailable

 

                    Kong, E Quoc DO Unavailable         Unavailable

 

                    Kong, E Quoc DO Unavailable         Unavailable

 

                    Kong, E Quoc DO Unavailable         Unavailable

 

                    Kong, E Quoc DO Unavailable         Unavailable

 

                    Kong, E Quoc DO Unavailable         Unavailable

 

                    Kong, E Quoc DO Unavailable         Unavailable

 

                    Kong, E Quoc DO Unavailable         Unavailable

 

                    Kong, E Quoc DO Unavailable         Unavailable

 

                    Kong, E Quoc DO Unavailable         Unavailable

 

                    Kong, E Quoc DO Unavailable         Unavailable

 

                    Kong, E Quoc DO Unavailable         Unavailable

 

                    Kong, E Quoc DO Unavailable         Unavailable

 

                    Kong, E Quoc DO Unavailable         Unavailable

 

                    Kong, E Quoc DO Unavailable         Unavailable

 

                    Kong, E Quoc DO Unavailable         Unavailable

 

                    Kong, E Quoc DO Unavailable         Unavailable

 

                    Kong, E Quoc DO Unavailable         Unavailable

 

                    DR SAMANTHA MELARA Unavailable         Unavailable



                                  



Re-disclosure Warning

          The records that you are about to access may contain information from 
federally-assisted alcohol or drug abuse programs. If such information is 
present, then the following federally mandated warning applies: This information
has been disclosed to you from records protected by federal confidentiality 
rules (42 CFR part 2). The federal rules prohibit you from making any further 
disclosure of this information unless further disclosure is expressly permitted 
by the written consent of the person to whom it pertains or as otherwise 
permitted by 42 CFR part 2. A general authorization for the release of medical 
or other information is NOT sufficient for this purpose. The Federal rules 
restrict any use of the information to criminally investigate or prosecute any 
alcohol or drug abuse patient.The records that you are about to access may 
contain highly sensitive health information, the redisclosure of which is 
protected by Article 27-F of the [facility] Public Health law. If you 
continue you may have access to information: Regarding HIV / AIDS; Provided by 
facilities licensed or operated by the [facility] Office of Mental Health; 
or Provided by the [facility] Office for People With Developmental 
Disabilities. If such information is present, then the following [place] 
mandated warning applies: This information has been disclosed to you from 
confidential records which are protected by state law. State law prohibits you 
from making any further disclosure of this information without the specific 
written consent of the person to whom it pertains, or as otherwise permitted by 
law. Any unauthorized further disclosure in violation of state law may result in
a fine or nursing home sentence or both. A general authorization for the release of 
medical or other information is NOT sufficient authorization for further disc
losure.                                                                         
    



Allergies and Adverse Reactions

          



           Type       Description Substance  Reaction   Status     Data Source(s

)

 

           Miscellaneous allergy No Known Drug Allergies No Known Drug Allergies

                       

[facility]

 

           Miscellaneous allergy No Known Food Allergies No Known Food Allergies

                       

[facility]

 

           Environmental Allergy seasonal   seasonal                         Cli

[facility]



                                                                                
                           



Family History

          



             Family Member Name Family Member Gender Family Member Status Date o

f Status 

Description                             Data Source(s)

 

           Unknown    Male       Problem                          [facility] ([facility]

 

                                        () 

 

           Unknown    Male       Problem                          [facility] ([facility])

 

                                        () - at age 81 

 

           Unknown    Male       Problem                          [facility] ([facility])

 

           Unknown    Male       Problem                          [facility] ([facility] )

 

                                        () 

 

           Unknown    Male       Problem                          [facility] ([facility]

 

                                         at age 84 



                                                                                
                                     



Encounters

          



           Encounter  Providers  Location   Date       Indications Data Source(s

)

 

                          Outpatient                Attender: DR BHARATH Barrios

er: DR BHARATH Dyerltant: Alexandru Blanco MD              008             10/21/2021 01:29:00 PM EDT - 10/21/2021 01:29:00

 PM EDT Lab test        [facility]

 

                                        Lab test 

 

                          Outpatient                Attender: DR BHARATH Barrios

er: DR SINHA TRANReferrer: DR BHARATH Paulinoant: Alexandru Blanco MD                           10/21/2021 11:36:00 A

M EDT - 10/21/2021 

12:00:00 PM EDT           F/U FROM CF ER            [facility]

 

                                        F/U FROM  ER 

 

                                        Patient discharged. 

 

                          Emergency                 Attender: JAVI Ponce

mitter: JAVI WILLSON PAConsultant: 

Alexandru Blanco MD                        10/16/2021 05:51:00 PM EDT - 10/16/2021 

06:45:00 PM EDT 

DIFFICULTY BREATHING                    [facility]

 

                                        DIFFICULTY BREATHING 

 

                                        Patient discharged. 

 

                          Emergency                 Attender: JAVI Ponce

mitter: JAVI WILLSON PAConsultant: 

Alexandru Blanco MD                        10/14/2021 11:37:00 AM EDT - 10/14/2021 

02:20:00 PM EDT CAN'T

BREATH                                  [facility]

 

                                        CAN'T BREATH 

 

                                        Patient discharged. 

 

                          Outpatient                Attender: DR BHARATH Barrios

er: DR BHARATH Dyerltant: Alexandru Blanco MD              008             10/05/2021 12:21:00 PM EDT - 10/05/2021 12:21:00

 PM EDT LAB TEST        [facility]

 

                                        LAB TEST 

 

                          Outpatient                Attender: DR BHARATH Barrios

er: DR SINHA TRANReferrer: DR BHARATH Dyerltant: Alexandru Blanco MD                           10/05/2021 11:47:00 A

M EDT - 10/05/2021 

12:10:00 PM EDT           Health check up           [facility]

 

                                        Health check up 

 

                                        Patient discharged. 

 

                Outpatient      Attender: Mitch Feliz DOAttender: Amador Goddard MD                 

2021 03:15:00 PM EDT                           [facility]

 

                Outpatient      Attender: Mitch Feliz DOAttender: ABRAHAM SÁNCHEZ ER-LAB-PNP      

2021 11:56:00 AM EDT                           [facility]

 

                Outpatient      Attender: JEREMIAH Alexander/Georgi/Ismael/Reindl

 2021 11:30:00 

AM EDT                                              [facility] ([facility]

[facility]

 

                          Emergency                 Attender: DR CARLI ZURITA

NAdmitter: DR CARLI MELARAConsultant: 

Alexandru Blanco MD                        2021 07:42:00 PM EDT - 2021 

09:42:00 PM EDT 

SHORTNESS OF BREATH                     [facility]

 

                                        SHORTNESS OF BREATH 

 

                                        Patient discharged. 

 

                          Outpatient                Attender: STACEY Hallmitter: STACEY THOMAS MDConsultant:

Alexandru Blanco MD    008                 2021 11:08:00 AM EDT - 2021 

11:08:00 AM EDT 

BLOOD WORK                              [facility]

 

                                        BLOOD WORK 

 

                          Outpatient                Attender: STACEY MCPHERSON

DAdmitter: STACEY THOMAS MDReferrer: 

STACEY THOMAS MDConsultant: Alexandru Blanco MD                           2021 10:43:00 AM EDT - 

2021 11:10:00 AM EDT Health check up           [facility]

 

                                        Health check up 

 

                                        Patient discharged. 

 

                          Outpatient                Attender: JEREMIAH Frank

diogo: DR FAIRBANKS OTHERAdmitter: JEREMIAH ALEXIS DOConsultant: Alexandru Blanco                        2021 01:17:

00 PM EDT - 2021

01:17:00 PM EDT           lab test                  [facility]

 

                                        lab test 

 

                          Outpatient                Attender: Quoc Allred

mitter: Quoc Mongeerrer: Quoc Triana DOConsultant: Alexandru Blanco MD                           2021 09:31

:00 AM EDT - 2021 

09:50:00 AM EDT           MIGRANE HEADACHE          [facility]

 

                                        MIGRANE HEADACHE 

 

                                        Patient discharged. 

 

                          Emergency                 Attender: JAVI Ponce

mitter: JAVI WILLSON PAConsultant: 

Alexandru Blanco MD                        2021 05:38:00 PM EDT - 2021 

06:45:00 PM EDT 

SHORTNESS OF BREATH                     [facility]

 

                                        SHORTNESS OF BREATH 

 

                                        Patient discharged. 

 

                Outpatient      Attender: JEREMIAH Alexander/Camby/Ismael/Reindl

 2021 02:00:00 

PM EDT                                              [facility] ([facility] )

 

                          Emergency                 Attender: JAVI Ponce

mitter: JAVI WILLSON PAConsultant: 

Alexandru Blanco MD                        2021 06:24:00 PM EDT - 2021 

08:30:00 PM EDT 

DIFFICULTY BREATHING                    [facility]

 

                                        DIFFICULTY BREATHING 

 

                                        Patient discharged. 

 

                Outpatient      Attender: JEREMIAH Alexander/Camby/Ismael/Reindl

 2021 11:00:00 

AM EDT                                              [facility] ([facility], )

 

                          Outpatient                Attender: STACEY Hallmitter: STACEY THOMAS MDReferrer: 

STACEY THOMAS MDConsultant: Alexandru Blanco MD                           2021 11:11:00 AM EDT - 

2021 12:00:00 PM EDT DIARRHEA                  [facility]

 

                                        DIARRHEA 

 

                                        Patient discharged. 

 

                          Emergency                 Attender: JAVI Ponce

mitter: JAVI WILLSON PAConsultant: 

Alexandru Blanco MD          [phone]                   2021 12:11:00 PM EDT -

 2021 02:23:00 PM EDT

                          CAN'T BREATH              [facility]

 

                                        CAN'T BREATH 

 

                                        Patient discharged. 

 

                          Outpatient                Attender: STACEY MCPHERSON

DAdmitter: STACEY THOMAS MDConsultant:

Alexandru Blanco MD    008                 2021 11:42:00 AM EDT - 2021 

11:42:00 AM EDT LAB

TEST                                    [facility]

 

                                        LAB TEST 

 

                          Outpatient                Attender: STACEY MCPHERSON

DAdmitter: STACEY THOMAS MDReferrer: 

STACEY Pritchardultant: Alexandru Blanco MD                           2021 10:57:00 AM EDT - 

2021 11:10:00 AM EDT Health check up           [facility]

 

                                        Health check up 

 

                                        Patient discharged. 

 

                          Emergency                 Attender: DR CARLI Whitfieldmitter: DR CARLI MELARAConsultant: 

Alexandru Blanco MD          [phone]                   2021 05:34:00 PM EDT -

 2021 06:56:00 PM EDT

                          Shortness of breath       [facility]

 

                                        Shortness of breath 

 

                                        Patient discharged. 

 

                          Outpatient                Attender: Katy SMITH

dmitter: Katy Jones PAConsultant: 

Alexandru Blanco MD    008                 05/10/2021 01:54:00 PM EDT - 05/10/2021 

01:54:00 PM EDT Lab

test                                    [facility]

 

                                        Lab test 

 

                          Emergency                 Attender: JAVI Ponce

mitter: JAVI WILLSON PAConsultant: 

Alexandru Blanco MD                        2021 02:45:00 PM EDT - 2021 

04:40:00 PM EDT 

DIFFICULTY BREATHING                    [facility]

 

                                        DIFFICULTY BREATHING 

 

                                        Patient discharged. 

 

                Outpatient      Attender: JEREMIAH Alexander/Camby/Ismael/Reindl

 2021 01:30:00 

PM EDT                                              [facility] ([facility] )

 

                          Emergency                 Attender: JAVI Ponce

mitter: JAVI WILLSON PAConsultant: 

Alexandru Blanco MD                 2021 11:39:00 AM EDT - 2021 01:00:0

0 PM EDT SOB             

[facility]

 

                                        SOB 

 

                                        Patient discharged. 

 

                          Outpatient                Attender: JEREMIAH Frank

diogo: DR FAIRBANKS OTHERAdmitter: JEREMIAH ALEXIS DOConsultant: Alexandru Blanco                        2021 02:47:

00 PM EDT - 2021

02:47:00 PM EDT           Lab test                  [facility]

 

                                        Lab test 

 

                Outpatient      Attender: JEREMIAH Alexander/Georgi/Ismael/Reindl

 2021 10:00:00 

AM EST                                              [facility]

 

                Outpatient      Attender: ARMOND Santos/Camby/Ismael/Rein

dl 2020 

01:30:00 PM EST                                     [facility] ([facility])

 

                          Emergency                 Attender: GUTIERREZ SMITH

dmitter: GUTIERREZ STAHL PAConsultant: 

Alexandru Blanco MD          [phone]                   2020 01:21:00 PM EST -

 2020 03:21:00 PM EST

                          ASTHMAS ATTACK            [facility]

 

                                        ASTHMAS ATTACK 

 

                                        Patient discharged. 

 

                          Emergency                 Attender: JAVI Ponce

mitter: JAVI Frederickerrer: JAVI WILLSON PAConsultant: Alexandru Blanco -008                   2020 12:10

:00 PM EST - 

2020 03:20:00 PM EST SHORTNESS OF BREATH       [facility]

 

                                        SHORTNESS OF BREATH 

 

                                        Patient discharged. 

 

                Outpatient      Attender: ARMOND Santos/Camby/Ismael/Rein

dl 10/28/2020 

02:30:00 PM EDT                                     [facility] ([facility], )

 

                          Emergency                 Attender: DR CARLI Rowell: DR CARLI Doan: DR CARLI MELARAConsultant: Alexandru Blanco MD                           10/27/202

0 10:32:00 AM EDT - 

10/27/2020 11:45:00 AM EDT Shortness of breath       [facility]

 

                                        Shortness of breath 

 

                                        Patient discharged. 

 

                          Emergency                 Attender: DR CARLI Petit: Alexandru Blanco MDAdmitter: DR CARLI Doan: DR CARLI MELARAConsultant: Alexandru Blanco MD       

                    

2020 03:47:00 PM EDT - 2020 05:00:00 PM EDT Difficulty breathing    

  

[facility]

 

                                        Difficulty breathing 

 

                                        Patient admitted. 

 

                          Emergency                 Attender: JAVI Ponce

mitter: JAVI Guevara: JAVI WILLSON PAConsultant: Alexandru Blanco -008                   09/15/2020 02:16

:00 PM EDT - 

09/15/2020 05:30:00 PM EDT Dizziness                 [facility]

 

                                        Dizziness 

 

                                        Patient discharged. 

 

                          Emergency                 Attender: GUTIERREZ SMITH

dmitter: GUTIERREZ STAHL PAReferrer: GUTIERREZ STAHL PAConsultant: Alexandru Blanco -008                   09/10/2020 09:2

9:00 AM EDT - 

09/10/2020 12:10:00 PM EDT Shortness of breath       [facility]

 

                                        Shortness of breath 

 

                                        Patient discharged. 



                                                                                
                                                                                
                                                                                
                                                                                
                                                                                
                                           



Immunizations

          



             Vaccine      Date         Status       Description  Data Source(s)

 

             COVID-19 VACCINE María 2021 12:00:00 AM EST completed      

           [facility]

 

                                        Vaccine Series Complete: YESThis Data wa

s Submitted to [facility] Via [employer]. 

 

             New in .  IIV4 2020 10:44:00 PM EST completed            

     [facility] ([facility], )

 

             New in .  IIV4 2020 01:53:00 PM EST completed            

     [facility] ([facility], )



                                                                                
                           



Medications

          



          Medication Brand Name Start Date Product Form Dose      Route     Admi

nistrative 

Instructions Pharmacy Instructions Status     Indications Reaction   Description

 Data 

Source(s)

 

                                        Albuterol 0.833 MG/ML / Ipratropium Brom

estrellita 0.167 MG/ML Inhalant Solution CL- 

ALBUTEROL/IPRATROP(DUONEB) 3MG-0.5MG CL- ALBUTEROL/IPRATROP(DUONEB) 3MG-0.5MG 

10/21/2021 12:25:00 PM EDT         1 U     INHALATION                 active    

              <td>CL- 

ALBUTEROL/IPRATROP(DUONEB) 3MG-
0.5MG</td><td>10/21/2021</td><td>10/21/2021</td><td>INHALATION</td><td>X1</td><t

d>1 unit(s)</td><td>2316207</td><td>RxNorm</td><td>1 EA INHALATION   ONE TIME 
DOSE</td>                               [facility]

 

        Medrol Dosepak 4MG Oral Tablet         10/21/2021 12:00:00 AM EDT       

  1 mL    BY MOUTH                 

active                                                      <td>Medrol Dosepak 4

MG Oral 

Tablet</td><td>10/21/2021</td><td>Unknown</td><td>BY 
MOUTH</td><td>DAILY</td><td>1 DOSE</td><td>[id number]</td><td>RxNorm</td><td>TAKE 1 
DOSE BY MOUTH DAILY</td>                [facility]

 

                                        benzonatate 100 MG Oral Capsule [Tessalo

n Perles] Tessalon Perles 100MG Oral 

Capsule, Liquid Filled    Tessalon Perles 100MG Oral Capsule, Liquid Filled 

10/21/2021 12:00:00 AM EDT         1 CAPSULE BY MOUTH                 active    

              <td>Tessalon 

Perles 100MG Oral Capsule, Liquid 
Filled</td><td>10/21/2021</td><td>Unknown</td><td>BY MOUTH</td><td>TWICE A 
DAY</td><td>1 CAPSULE</td><td>755979</td><td>RxNorm</td><td>TAKE 1 CAPSULE BY 
MOUTH TWICE A DAY FOR cough AS NEEDED</td> [facility]

 

                          Prednisone 20 MG Oral Tablet predniSONE 20MG Oral Tabl

et predniSONE 20MG Oral 

Tablet 10/16/2021 12:00:00 AM EDT        2 TABLET BY MOUTH               active 

              

<td>predniSONE 20MG Oral Tablet</td><td>10/16/2021</td><td>Unknown</td><td>BY 
MOUTH</td><td>DAILY</td><td>2 TABLET</td><td>927839</td><td>RxNorm</td><td>TAKE 
2 TABLET BY MOUTH DAILY x 2 additional days</td> [facility]

 

                          Prednisone 20 MG Oral Tablet predniSONE 20MG Oral Tabl

et predniSONE 20MG Oral 

Tablet 10/14/2021 12:00:00 AM EDT        2 TABLET BY MOUTH               active 

              

<td>predniSONE 20MG Oral Tablet</td><td>10/14/2021</td><td>Unknown</td><td>BY 
MOUTH</td><td>DAILY</td><td>2 TABLET</td><td>779128</td><td>RxNorm</td><td>TAKE 
2 TABLET BY MOUTH DAILY x 3 days then resume the daily 10 mg tab</td> [facility]

 

          Calcium 600 600 MG Oral Tablet           10/06/2021 12:00:00 AM EDT   

        1 TABLET  BY MOUTH  

                          active                                 <td>Calcium 600

 600 MG Oral 

Tablet</td><td>10/06/2021</td><td>Unknown</td><td>BY 
MOUTH</td><td>DAILY</td><td>1 TABLET</td><td></td><td>RxNorm</td><td>TAKE 1 
TABLET BY MOUTH DAILY</td>              [facility]

 

        Prednisone 10 MG Oral Tablet Prednisone 2021 12:00:00 AM EDT      

           ORAL                    

active                                                          [facility] ([facility] )

 

                          Cholecalciferol 1000 UNT Oral Tablet Vitamin D 1000IU 

Oral Tablet Vitamin D 

1000IU Oral Tablet 2021 12:00:00 AM EDT         1 TABLET BY MOUTH         

        active           

                                        <td>Vitamin D 1000IU Oral Tablet</td><td

>2021</td><td>Unknown</td><td>BY 

MOUTH</td><td>DAILY</td><td>1 TABLET</td><td>047405</td><td>RxNorm</td><td>TAKE 
1 TABLET BY MOUTH DAILY</td>            [facility]

 

                          Cholecalciferol 1000 UNT Oral Tablet Vitamin D 1000IU 

Oral Tablet Vitamin D 

1000IU Oral Tablet 2021 12:00:00 AM EDT         1 TABLET BY MOUTH         

        active           

                                        <td>Vitamin D 1000IU Oral Tablet</td><td

>2021</td><td>Unknown</td><td>BY 

MOUTH</td><td>DAILY</td><td>1 TABLET</td><td>642930</td><td>RxNorm</td><td>TAKE 
1 TABLET BY MOUTH DAILY</td>            [facility]

 

           METHYLPRED(SOLU-MEDROL)125MG/2ML**F**INJ            2021 08:29:

00 PM EDT            125 mg     

IM OPTIONS                       active                           <td>METHYLPRED

(SOLU-

MEDROL)125MG/2ML**F**INJ</td><td>2021</td><td>2021</td><td>IM 
OPTIONS</td><td>X1</td><td>125 MG</td><td>[id number]</td><td>RxNorm</td><td>125 MG 
IM OPTIONS   ONE TIME DOSE</td>         [facility]

 

                          Albuterol 0.83 MG/ML Inhalant Solution ALBUTEROL (PROV

ENTIL) 0.083%**F** 

ALBUTEROL (PROVENTIL) 0.083%**F** 2021 07:57:00 PM EDT            3 U     

   INHALATION            

                active                                          <td>ALBUTEROL (P

ROVENTIL) 

0.083%**F**</td><td>2021</td><td>2021</td><td>INHALATION</td><td>X1<

 VIAL</td><td>731086</td><td>RxNorm</td><td>3 VIAL INHALATION   ONE TIME DOSE
</td>                                   [facility]

 

                                        Albuterol 0.833 MG/ML / Ipratropium Brom

estrellita 0.167 MG/ML Inhalant Solution 

ALBUTEROL/IPRAT(DUONEB)3MG/0.5MG**F**3ML 

ALBUTEROL/IPRAT(DUONEB)3MG/0.5MG**F**3ML 2021 07:57:00 PM EDT             

    3 mL            

INHALATION                       active                           

<td>ALBUTEROL/IPRAT(DUONEB)3MG/0.5MG**F**3ML</td><td>2021</td><td>20

21</td><td>INHALATION</td><td>X1</td><td>3 
ML</td><td>5850828</td><td>RxNorm</td><td>3 ML INHALATION   ONE TIME DOSE</td> 

[facility]

 

                          Prednisone 50 MG Oral Tablet predniSONE 50MG Oral Tabl

et predniSONE 50MG Oral 

Tablet 2021 12:00:00 AM EDT        1 TABLET BY MOUTH               active 

              

<td>predniSONE 50MG Oral Tablet</td><td>2021</td><td>Unknown</td><td>BY 
MOUTH</td><td>DAILY</td><td>1 TABLET</td><td>987757</td><td>RxNorm</td><td>TAKE 
1 TABLET BY MOUTH DAILY</td>            [facility]

 

                          Ergocalciferol 40724 UNT Oral Capsule Vitamin D 56227E

U Oral Capsule Vitamin D 

21142NC Oral Capsule 2021 12:00:00 AM EDT         1 CAPSULE BY MOUTH      

           active  

                                                    <td>Vitamin D 41519RE Oral 

Capsule</td><td>2021</td><td>Unknown</td><td>BY MOUTH</td><td>Once a 
Week</td><td>1 CAPSULE</td><td>2535829</td><td>RxNorm</td><td>TAKE 1 CAPSULE BY 
MOUTH Once a Week</td>                  [facility]

 

                          Ergocalciferol 22964 UNT Oral Capsule Vitamin D 91525X

U Oral Capsule Vitamin D 

82979RS Oral Capsule 2021 12:00:00 AM EDT         1 CAPSULE BY MOUTH      

           active  

                                                    <td>Vitamin D 21304HB Oral 

Capsule</td><td>2021</td><td>Unknown</td><td>BY MOUTH</td><td>Once a 
Week</td><td>1 CAPSULE</td><td>7869934</td><td>RxNorm</td><td>TAKE 1 CAPSULE BY 
MOUTH Once a Week</td>                  [facility]

 

                          Ergocalciferol 96964 UNT Oral Capsule Vitamin D 94811M

U Oral Capsule Vitamin D 

95977WH Oral Capsule 2021 12:00:00 AM EDT         1 CAPSULE BY MOUTH      

           active  

                                                    <td>Vitamin D 95576RV Oral 

Capsule</td><td>2021</td><td>Unknown</td><td>BY MOUTH</td><td>Once a 
Week</td><td>1 CAPSULE</td><td>8593257</td><td>RxNorm</td><td>TAKE 1 CAPSULE BY 
MOUTH Once a Week</td>                  [facility]

 

                          Ergocalciferol 66822 UNT Oral Capsule Vitamin D 42526E

U Oral Capsule Vitamin D 

54219TJ Oral Capsule 2021 12:00:00 AM EDT         1 CAPSULE BY MOUTH      

           active  

                                                    <td>Vitamin D 37645ZQ Oral 

Capsule</td><td>2021</td><td>Unknown</td><td>BY MOUTH</td><td>Once a 
Week</td><td>1 CAPSULE</td><td>2147849</td><td>RxNorm</td><td>TAKE 1 CAPSULE BY 
MOUTH Once a Week</td>                  [facility]

 

                          Prednisone 10 MG Oral Tablet predniSONE 10MG Oral Tabl

et predniSONE 10MG Oral 

Tablet 2021 12:00:00 AM EDT        1 TABLET ORAL                 active   

            <td>predniSONE 

10MG Oral 
Tablet</td><td>2021</td><td>Unknown</td><td>ORAL</td><td>DAILY</td><td>1 
TABLET</td><td>965293</td><td>RxNorm</td><td>TAKE 1 TABLET ORAL DAILY</td> 

[facility]

 

                          Prednisone 10 MG Oral Tablet predniSONE 10MG Oral Tabl

et predniSONE 10MG Oral 

Tablet 2021 12:00:00 AM EDT        1 TABLET ORAL                 active   

            <td>predniSONE 

10MG Oral 
Tablet</td><td>2021</td><td>Unknown</td><td>ORAL</td><td>DAILY</td><td>1 
TABLET</td><td>009663</td><td>RxNorm</td><td>TAKE 1 TABLET ORAL DAILY</td> 

[facility]

 

                    Theophylline 200MG Oral Tablet, Extended Release, 12 HR     

                2021 12:00:00 AM

EDT             1 TABLET ORAL                    active                  <td>The

ophylline 200MG Oral Tablet, Extended 

Release, 12 
HR</td><td>2021</td><td>Unknown</td><td>ORAL</td><td>DAILY</td><td>1 
TABLET</td><td></td><td>RxNorm</td><td>TAKE 1 TABLET ORAL DAILY</td> [facility]

 

                          Prednisone 10 MG Oral Tablet predniSONE 10MG Oral Tabl

et predniSONE 10MG Oral 

Tablet 2021 12:00:00 AM EDT        1 TABLET ORAL                 active   

            <td>predniSONE 

10MG Oral 
Tablet</td><td>2021</td><td>Unknown</td><td>ORAL</td><td>DAILY</td><td>1 
TABLET</td><td>348954</td><td>RxNorm</td><td>TAKE 1 TABLET ORAL DAILY</td> 

[facility]

 

        Azithromycin 250MG Oral Tablet         2021 12:00:00 AM EDT       

  2 TABLET ORAL                    

active                                                      <td>Azithromycin 250

MG Oral 

Tablet</td><td>2021</td><td>Unknown</td><td>ORAL</td><td>EVERY OTHER 
DAY</td><td>2 TABLET</td><td>099991</td><td>RxNorm</td><td>TAKE 2 TABLET ORAL 
EVERY OTHER DAY</td>                    [facility]

 

                    Theophylline 200MG Oral Tablet, Extended Release, 12 HR     

                2021 12:00:00 AM

EDT             1 TABLET ORAL                    active                  <td>The

ophylline 200MG Oral Tablet, Extended 

Release, 12 
HR</td><td>2021</td><td>Unknown</td><td>ORAL</td><td>DAILY</td><td>1 
TABLET</td><td></td><td>RxNorm</td><td>TAKE 1 TABLET ORAL DAILY</td> [facility]

 

                    Theophylline 200MG Oral Tablet, Extended Release, 12 HR     

                2021 12:00:00 AM

EDT             1 TABLET ORAL                    active                  <td>The

ophylline 200MG Oral Tablet, Extended 

Release, 12 
HR</td><td>2021</td><td>Unknown</td><td>ORAL</td><td>DAILY</td><td>1 
TABLET</td><td></td><td>RxNorm</td><td>TAKE 1 TABLET ORAL DAILY</td> [facility]

 

                    Theophylline 200MG Oral Tablet, Extended Release, 12 HR     

                2021 12:00:00 AM

EDT             1 TABLET ORAL                    active                  <td>The

ophylline 200MG Oral Tablet, Extended 

Release, 12 
HR</td><td>2021</td><td>Unknown</td><td>ORAL</td><td>DAILY</td><td>1 
TABLET</td><td></td><td>RxNorm</td><td>TAKE 1 TABLET ORAL DAILY</td> [facility]

 

        Azithromycin 250MG Oral Tablet         2021 12:00:00 AM EDT       

  2 TABLET ORAL                    

active                                                      <td>Azithromycin 250

MG Oral 

Tablet</td><td>2021</td><td>Unknown</td><td>ORAL</td><td>EVERY OTHER 
DAY</td><td>2 TABLET</td><td>366767</td><td>RxNorm</td><td>TAKE 2 TABLET ORAL 
EVERY OTHER DAY</td>                    [facility]

 

                          Prednisone 10 MG Oral Tablet predniSONE 10MG Oral Tabl

et predniSONE 10MG Oral 

Tablet 2021 12:00:00 AM EDT        1 TABLET ORAL                 active   

            <td>predniSONE 

10MG Oral 
Tablet</td><td>2021</td><td>Unknown</td><td>ORAL</td><td>DAILY</td><td>1 
TABLET</td><td>243756</td><td>RxNorm</td><td>TAKE 1 TABLET ORAL DAILY</td> 

[facility]

 

        Azithromycin 250MG Oral Tablet         2021 12:00:00 AM EDT       

  2 TABLET ORAL                    

active                                                      <td>Azithromycin 250

MG Oral 

Tablet</td><td>2021</td><td>Unknown</td><td>ORAL</td><td>EVERY OTHER 
DAY</td><td>2 TABLET</td><td>283599</td><td>RxNorm</td><td>TAKE 2 TABLET ORAL 
EVERY OTHER DAY</td>                    [facility]

 

                                        2 ML Ketorolac Tromethamine 30 MG/ML Inj

ection CL- KETOROLAC (TORADOL) 60MG/2ML 

VIAL         CL- KETOROLAC (TORADOL) 60MG/2ML VIAL 2021 10:10:00 AM EDT   

           1 U          IM 

OPTIONS                          active                           <td>CL- KETORO

LAC (TORADOL) 60MG/2ML 

VIAL</td><td>2021</td><td>2021</td><td>IM 
OPTIONS</td><td>X1</td><td>1 unit(s)</td><td>9818613</td><td>RxNorm</td><td>1 EA
IM OPTIONS   ONE TIME DOSE</td>         [facility]

 

                          Sumatriptan 50 MG Oral Tablet [Imitrex] Imitrex 50MG O

ral Tablet Imitrex 50MG 

Oral Tablet 2021 12:00:00 AM EDT        1 TABLET BY MOUTH               ac

tive               

<td>Imitrex 50MG Oral Tablet</td><td>2021</td><td>Unknown</td><td>BY 
MOUTH</td><td>ONE TIME DOSE</td><td>1 
TABLET</td><td>688780</td><td>RxNorm</td><td>TAKE 1 TABLET BY MOUTH ONE TIME 
DOSE</td>                               [facility]

 

                          Sumatriptan 50 MG Oral Tablet [Imitrex] Imitrex 50MG O

ral Tablet Imitrex 50MG 

Oral Tablet 2021 12:00:00 AM EDT        1 TABLET BY MOUTH               ac

tive               

<td>Imitrex 50MG Oral Tablet</td><td>2021</td><td>Unknown</td><td>BY 
MOUTH</td><td>ONE TIME DOSE</td><td>1 
TABLET</td><td>233954</td><td>RxNorm</td><td>TAKE 1 TABLET BY MOUTH ONE TIME 
DOSE</td>                               [facility]

 

                          Sumatriptan 50 MG Oral Tablet [Imitrex] Imitrex 50MG O

ral Tablet Imitrex 50MG 

Oral Tablet 2021 12:00:00 AM EDT        1 TABLET BY MOUTH               ac

tive               

<td>Imitrex 50MG Oral Tablet</td><td>2021</td><td>Unknown</td><td>BY 
MOUTH</td><td>ONE TIME DOSE</td><td>1 
TABLET</td><td>391666</td><td>RxNorm</td><td>TAKE 1 TABLET BY MOUTH ONE TIME 
DOSE</td>                               [facility]

 

                          Metoclopramide 10 MG Oral Tablet [Reglan] Reglan 10MG 

Oral Tablet Reglan 10MG 

Oral Tablet 2021 12:00:00 AM EDT        1 TABLET BY MOUTH               ac

tive               

<td>Reglan 10MG Oral Tablet</td><td>2021</td><td>Unknown</td><td>BY 
MOUTH</td><td>ONE TIME DOSE</td><td>1 
TABLET</td><td>892396</td><td>RxNorm</td><td>TAKE 1 TABLET BY MOUTH ONE TIME 
DOSE</td>                               [facility]

 

                          Sumatriptan 50 MG Oral Tablet [Imitrex] Imitrex 50MG O

ral Tablet Imitrex 50MG 

Oral Tablet 2021 12:00:00 AM EDT        1 TABLET BY MOUTH               ac

tive               

<td>Imitrex 50MG Oral Tablet</td><td>2021</td><td>Unknown</td><td>BY 
MOUTH</td><td>ONE TIME DOSE</td><td>1 
TABLET</td><td>020734</td><td>RxNorm</td><td>TAKE 1 TABLET BY MOUTH ONE TIME 
DOSE</td>                               [facility]

 

                          Sumatriptan 50 MG Oral Tablet [Imitrex] Imitrex 50MG O

ral Tablet Imitrex 50MG 

Oral Tablet 2021 12:00:00 AM EDT        1 TABLET BY MOUTH               ac

tive               

<td>Imitrex 50MG Oral Tablet</td><td>2021</td><td>Unknown</td><td>BY 
MOUTH</td><td>ONE TIME DOSE</td><td>1 
TABLET</td><td>995805</td><td>RxNorm</td><td>TAKE 1 TABLET BY MOUTH ONE TIME 
DOSE</td>                               [facility]

 

                          Metoclopramide 10 MG Oral Tablet [Reglan] Reglan 10MG 

Oral Tablet Reglan 10MG 

Oral Tablet 2021 12:00:00 AM EDT        1 TABLET BY MOUTH               ac

tive               

<td>Reglan 10MG Oral Tablet</td><td>2021</td><td>Unknown</td><td>BY 
MOUTH</td><td>ONE TIME DOSE</td><td>1 
TABLET</td><td>193333</td><td>RxNorm</td><td>TAKE 1 TABLET BY MOUTH ONE TIME 
DOSE</td>                               [facility]

 

                          Metoclopramide 10 MG Oral Tablet [Reglan] Reglan 10MG 

Oral Tablet Reglan 10MG 

Oral Tablet 2021 12:00:00 AM EDT        1 TABLET BY MOUTH               ac

tive               

<td>Reglan 10MG Oral Tablet</td><td>2021</td><td>Unknown</td><td>BY 
MOUTH</td><td>ONE TIME DOSE</td><td>1 
TABLET</td><td>803828</td><td>RxNorm</td><td>TAKE 1 TABLET BY MOUTH ONE TIME 
DOSE</td>                               [facility]

 

                          Metoclopramide 10 MG Oral Tablet [Reglan] Reglan 10MG 

Oral Tablet Reglan 10MG 

Oral Tablet 2021 12:00:00 AM EDT        1 TABLET BY MOUTH               ac

tive               

<td>Reglan 10MG Oral Tablet</td><td>2021</td><td>Unknown</td><td>BY 
MOUTH</td><td>ONE TIME DOSE</td><td>1 
TABLET</td><td>946868</td><td>RxNorm</td><td>TAKE 1 TABLET BY MOUTH ONE TIME 
DOSE</td>                               [facility]

 

                          Metoclopramide 10 MG Oral Tablet [Reglan] Reglan 10MG 

Oral Tablet Reglan 10MG 

Oral Tablet 2021 12:00:00 AM EDT        1 TABLET BY MOUTH               ac

tive               

<td>Reglan 10MG Oral Tablet</td><td>2021</td><td>Unknown</td><td>BY 
MOUTH</td><td>ONE TIME DOSE</td><td>1 
TABLET</td><td>092971</td><td>RxNorm</td><td>TAKE 1 TABLET BY MOUTH ONE TIME 
DOSE</td>                               [facility]

 

                          Prednisone 20 MG Oral Tablet predniSONE 20MG Oral Tabl

et predniSONE 20MG Oral 

Tablet 2021 12:00:00 AM EDT        40 MILLIGRAM BY MOUTH               act

merna               

<td>predniSONE 20MG Oral Tablet</td><td>2021</td><td>Unknown</td><td>BY 
MOUTH</td><td>DAILY</td><td>40 MILLIGRAM</td><td>509124</td><td>RxNorm</td><td>
TAKE 40 MILLIGRAM BY MOUTH DAILY X 5 DAYS</td> [facility]

 

                    Theophylline 200 MG Extended Release Oral Capsule [Hood-24] 

Hood-24             2021 

12:00:00 AM EDT               ORAL                 active                      M

[facility] ([facility], 

)

 

        Prednisone 10 MG Oral Tablet Prednisone 2021 12:00:00 AM EDT      

           ORAL                    

active                                                          MED[facility] ([facility], )

 

                    Theophylline 400 MG Extended Release Oral Tablet Theophyllin

e ER     2021 

12:00:00 AM EDT               ORAL                 completed                    

  MED[facility] ([facility],

 )

 

                          Ergocalciferol 72793 UNT Oral Capsule Vitamin D 45540R

U Oral Capsule Vitamin D 

53505QQ Oral Capsule 2021 12:00:00 AM EDT         1 CAPSULE BY MOUTH      

           active  

                                                    <td>Vitamin D 92414UV Oral 

Capsule</td><td>2021</td><td>Unknown</td><td>BY MOUTH</td><td>Once a 
Week</td><td>1 CAPSULE</td><td>5115284</td><td>RxNorm</td><td>TAKE 1 CAPSULE BY 
MOUTH Once a Week</td>                  [facility]

 

                          montelukast 10 MG Oral Tablet Montelukast Sodium 10MG 

Oral Tablet Montelukast 

Sodium 10MG Oral Tablet 2021 12:00:00 AM EDT           10 MILLIGRAMS ORAL 

                         

active                                                      <td>Montelukast Sodi

um 10MG Oral 

Tablet</td><td>2021</td><td>Unknown</td><td>ORAL</td><td>DAILY</td><td>10 
MILLIGRAMS</td><td>2002</td><td>RxNorm</td><td>TAKE 10 MILLIGRAMS ORAL DAILY
</td>                                   [facility]

 

                                        Albuterol 0.83 MG/ML Inhalant Solution A

lbuterol Sulfate 0.083% Inhalation 

Solution                  Albuterol Sulfate 0.083% Inhalation Solution  12:00:00 AM EDT

                2 mL    INHALATION                 active                  <td>A

lbuterol Sulfate 0.083% Inhalation 

Solution</td><td>2021</td><td>Unknown</td><td>INHALATION</td><td>AS NEEDED
 EVERY 4 HR</td><td>2 PUFF</td><td>599259</td><td>RxNorm</td><td>PUFF 2 PUFF 
INHALATION AS NEEDED EVERY 4 HR</td>    [facility]

 

                                        Omeprazole 20 MG Delayed Release Oral Ca

psule Omeprazole 20MG Oral Capsule, 

Delayed Release           Omeprazole 20MG Oral Capsule, Delayed Release 20 

12:00:00 AM EDT         20 MILLIGRAMS ORAL                    active            

      <td>Omeprazole 20MG Oral 

Capsule, Delayed 
Release</td><td>2021</td><td>Unknown</td><td>ORAL</td><td>DAILY</td><td>20
 MILLIGRAMS</td><td>052197</td><td>RxNorm</td><td>TAKE 20 MILLIGRAMS ORAL 
DAILY</td>                              [facility]

 

                          Meclizine Hydrochloride 25 MG Oral Tablet Meclizine HC

l 25MG Oral Tablet 

Meclizine HCl 25MG Oral Tablet 2021 12:00:00 AM EDT                 25 MIL

LIGRAM    BY 

MOUTH                            active                           <td>Meclizine 

HCl 25MG Oral 

Tablet</td><td>2021</td><td>Unknown</td><td>BY 
MOUTH</td><td>PRNTID</td><td>25 MILLIGRAM</td><td>233530</td><td>RxNorm</td><td>
TAKE 25 MILLIGRAM BY MOUTH PRNTID FOR dizziness</td> [facility]

 

        Flonase 0.05MG/Actuation Nasal Spray         2021 12:00:00 AM EDT 

        2 mL                            

active                                                      <td>Flonase 0.05MG/A

ctuation Nasal 

[place]</td><td>2021</td><td>Unknown</td><td>BOTH NARES</td><td>TWICE A 
DAY</td><td>2 SPRAY</td><td>3677371</td><td>RxNorm</td><td>2 SPRAY BOTH NARES 
TWICE A DAY</td>                        [facility]

 

        Flonase 0.05MG/Actuation Nasal Spray         2021 12:00:00 AM EDT 

        2 mL                            

active                                                      <td>Flonase 0.05MG/A

ctuation Nasal 

[place]</td><td>2021</td><td>Unknown</td><td>BOTH NARES</td><td>TWICE A 
DAY</td><td>2 SPRAY</td><td>2940688</td><td>RxNorm</td><td>2 SPRAY BOTH NARES 
TWICE A DAY</td>                        [facility]

 

                                        Omeprazole 20 MG Delayed Release Oral Ca

psule Omeprazole 20MG Oral Capsule, 

Delayed Release           Omeprazole 20MG Oral Capsule, Delayed Release 20 

12:00:00 AM EDT         20 MILLIGRAMS ORAL                    active            

      <td>Omeprazole 20MG Oral 

Capsule, Delayed 
Release</td><td>2021</td><td>Unknown</td><td>ORAL</td><td>DAILY</td><td>20
 MILLIGRAMS</td><td>837244</td><td>RxNorm</td><td>TAKE 20 MILLIGRAMS ORAL 
DAILY</td>                              [facility]

 

                          Levothyroxine Sodium 0.075 MG Oral Tablet Levothyroxin

e 75MCG Oral Tablet 

Levothyroxine 75MCG Oral Tablet 2021 12:00:00 AM EDT           75 ug     O

RAL                          

active                                                      <td>Levothyroxine 75

MCG Oral 

Tablet</td><td>2021</td><td>Unknown</td><td>ORAL</td><td>DAILY</td><td>75 
MCG</td><td>225235</td><td>RxNorm</td><td>TAKE 75 MCG ORAL DAILY</td> [facility]

 

                          montelukast 10 MG Oral Tablet Montelukast Sodium 10MG 

Oral Tablet Montelukast 

Sodium 10MG Oral Tablet 2021 12:00:00 AM EDT           10 MILLIGRAMS ORAL 

                         

active                                                      <td>Montelukast Sodi

um 10MG Oral 

Tablet</td><td>2021</td><td>Unknown</td><td>ORAL</td><td>DAILY</td><td>10 
MILLIGRAMS</td><td>2002</td><td>RxNorm</td><td>TAKE 10 MILLIGRAMS ORAL DAILY
</td>                                   [facility]

 

                                        Omeprazole 20 MG Delayed Release Oral Ca

psule Omeprazole 20MG Oral Capsule, 

Delayed Release           Omeprazole 20MG Oral Capsule, Delayed Release 20 

12:00:00 AM EDT         20 MILLIGRAMS ORAL                    active            

      <td>Omeprazole 20MG Oral 

Capsule, Delayed 
Release</td><td>2021</td><td>Unknown</td><td>ORAL</td><td>DAILY</td><td>20
 MILLIGRAMS</td><td>1980</td><td>RxNorm</td><td>TAKE 20 MILLIGRAMS ORAL 
DAILY</td>                              [facility]

 

        Flonase 0.05MG/Actuation Nasal Spray         2021 12:00:00 AM EDT 

        2 mL                            

active                                                      <td>Flonase 0.05MG/A

ctuation Nasal 

[place]</td><td>2021</td><td>Unknown</td><td>BOTH NARES</td><td>TWICE A 
DAY</td><td>2 SPRAY</td><td>2047239</td><td>RxNorm</td><td>2 SPRAY BOTH NARES 
TWICE A DAY</td>                        [facility]

 

                          Meclizine Hydrochloride 25 MG Oral Tablet Meclizine HC

l 25MG Oral Tablet 

Meclizine HCl 25MG Oral Tablet 2021 12:00:00 AM EDT                 25 MIL

LIGRAM    BY 

MOUTH                            active                           <td>Meclizine 

HCl 25MG Oral 

Tablet</td><td>2021</td><td>Unknown</td><td>BY 
MOUTH</td><td>PRNTID</td><td>25 MILLIGRAM</td><td>916607</td><td>RxNorm</td><td>
TAKE 25 MILLIGRAM BY MOUTH PRNTID FOR dizziness</td> [facility]

 

                          Meclizine Hydrochloride 25 MG Oral Tablet Meclizine HC

l 25MG Oral Tablet 

Meclizine HCl 25MG Oral Tablet 2021 12:00:00 AM EDT                 25 MIL

LIGRAM    BY 

MOUTH                            active                           <td>Meclizine 

HCl 25MG Oral 

Tablet</td><td>2021</td><td>Unknown</td><td>BY 
MOUTH</td><td>PRNTID</td><td>25 MILLIGRAM</td><td>456676</td><td>RxNorm</td><td>
TAKE 25 MILLIGRAM BY MOUTH PRNTID FOR dizziness</td> [facility]

 

                          Levothyroxine Sodium 0.075 MG Oral Tablet Levothyroxin

e 75MCG Oral Tablet 

Levothyroxine 75MCG Oral Tablet 2021 12:00:00 AM EDT           75 ug     O

RAL                          

active                                                      <td>Levothyroxine 75

MCG Oral 

Tablet</td><td>2021</td><td>Unknown</td><td>ORAL</td><td>DAILY</td><td>75 
MCG</td><td>463277</td><td>RxNorm</td><td>TAKE 75 MCG ORAL DAILY</td> [facility]

 

                                        Omeprazole 20 MG Delayed Release Oral Ca

psule Omeprazole 20MG Oral Capsule, 

Delayed Release           Omeprazole 20MG Oral Capsule, Delayed Release 20 

12:00:00 AM EDT         20 MILLIGRAMS ORAL                    active            

      <td>Omeprazole 20MG Oral 

Capsule, Delayed 
Release</td><td>2021</td><td>Unknown</td><td>ORAL</td><td>DAILY</td><td>20
 MILLIGRAMS</td><td>412746</td><td>RxNorm</td><td>TAKE 20 MILLIGRAMS ORAL 
DAILY</td>                              [facility]

 

                                        Albuterol 0.83 MG/ML Inhalant Solution A

lbuterol Sulfate 0.083% Inhalation 

Solution                  Albuterol Sulfate 0.083% Inhalation Solution  12:00:00 AM EDT

                2 mL    INHALATION                 active                  <td>A

lbuterol Sulfate 0.083% Inhalation 

Solution</td><td>2021</td><td>Unknown</td><td>INHALATION</td><td>AS NEEDED
 EVERY 4 HR</td><td>2 PUFF</td><td>195680</td><td>RxNorm</td><td>PUFF 2 PUFF 
INHALATION AS NEEDED EVERY 4 HR</td>    [facility]

 

                          Meclizine Hydrochloride 25 MG Oral Tablet Meclizine HC

l 25MG Oral Tablet 

Meclizine HCl 25MG Oral Tablet 2021 12:00:00 AM EDT                 25 MIL

LIGRAM    BY 

MOUTH                            active                           <td>Meclizine 

HCl 25MG Oral 

Tablet</td><td>2021</td><td>Unknown</td><td>BY 
MOUTH</td><td>PRNTID</td><td>25 MILLIGRAM</td><td>879822</td><td>RxNorm</td><td>
TAKE 25 MILLIGRAM BY MOUTH PRNTID FOR dizziness</td> [facility]

 

                          Levothyroxine Sodium 0.075 MG Oral Tablet Levothyroxin

e 75MCG Oral Tablet 

Levothyroxine 75MCG Oral Tablet 2021 12:00:00 AM EDT           75 ug     O

RAL                          

active                                                      <td>Levothyroxine 75

MCG Oral 

Tablet</td><td>2021</td><td>Unknown</td><td>ORAL</td><td>DAILY</td><td>75 
MCG</td><td>779829</td><td>RxNorm</td><td>TAKE 75 MCG ORAL DAILY</td> [facility]

 

                          Levothyroxine Sodium 0.075 MG Oral Tablet Levothyroxin

e 75MCG Oral Tablet 

Levothyroxine 75MCG Oral Tablet 2021 12:00:00 AM EDT           75 ug     O

RAL                          

active                                                      <td>Levothyroxine 75

MCG Oral 

Tablet</td><td>2021</td><td>Unknown</td><td>ORAL</td><td>DAILY</td><td>75 
MCG</td><td>823696</td><td>RxNorm</td><td>TAKE 75 MCG ORAL DAILY</td> [facility]

 

                          Meclizine Hydrochloride 25 MG Oral Tablet Meclizine HC

l 25MG Oral Tablet 

Meclizine HCl 25MG Oral Tablet 2021 12:00:00 AM EDT                 25 MIL

LIGRAM    BY 

MOUTH                            active                           <td>Meclizine 

HCl 25MG Oral 

Tablet</td><td>2021</td><td>Unknown</td><td>BY 
MOUTH</td><td>PRNTID</td><td>25 MILLIGRAM</td><td>340237</td><td>RxNorm</td><td>
TAKE 25 MILLIGRAM BY MOUTH PRNTID FOR dizziness</td> [facility]

 

        Flonase 0.05MG/Actuation Nasal Spray         2021 12:00:00 AM EDT 

        2 mL                            

active                                                      <td>Flonase 0.05MG/A

ctuation Nasal 

[place]</td><td>2021</td><td>Unknown</td><td>BOTH NARES</td><td>TWICE A 
DAY</td><td>2 SPRAY</td><td>9551031</td><td>RxNorm</td><td>2 SPRAY BOTH NARES 
TWICE A DAY</td>                        [facility]

 

                                        Albuterol 0.83 MG/ML Inhalant Solution A

lbuterol Sulfate 0.083% Inhalation 

Solution                  Albuterol Sulfate 0.083% Inhalation Solution  12:00:00 AM EDT

                2 mL    INHALATION                 active                  <td>A

lbuterol Sulfate 0.083% Inhalation 

Solution</td><td>2021</td><td>Unknown</td><td>INHALATION</td><td>AS NEEDED
 EVERY 4 HR</td><td>2 PUFF</td><td>023660</td><td>RxNorm</td><td>PUFF 2 PUFF 
INHALATION AS NEEDED EVERY 4 HR</td>    [facility]

 

                          Meclizine Hydrochloride 25 MG Oral Tablet Meclizine HC

l 25MG Oral Tablet 

Meclizine HCl 25MG Oral Tablet 2021 12:00:00 AM EDT                 25 MIL

LIGRAM    BY 

MOUTH                            active                           <td>Meclizine 

HCl 25MG Oral 

Tablet</td><td>2021</td><td>Unknown</td><td>BY 
MOUTH</td><td>PRNTID</td><td>25 MILLIGRAM</td><td>501208</td><td>RxNorm</td><td>
TAKE 25 MILLIGRAM BY MOUTH PRNTID FOR dizziness</td> [facility]

 

                                        Albuterol 0.83 MG/ML Inhalant Solution A

lbuterol Sulfate 0.083% Inhalation 

Solution                  Albuterol Sulfate 0.083% Inhalation Solution  12:00:00 AM EDT

                2 mL    INHALATION                 active                  <td>A

lbuterol Sulfate 0.083% Inhalation 

Solution</td><td>2021</td><td>Unknown</td><td>INHALATION</td><td>AS NEEDED
 EVERY 4 HR</td><td>2 PUFF</td><td>108881</td><td>RxNorm</td><td>PUFF 2 PUFF 
INHALATION AS NEEDED EVERY 4 HR</td>    [facility]

 

                          montelukast 10 MG Oral Tablet Montelukast Sodium 10MG 

Oral Tablet Montelukast 

Sodium 10MG Oral Tablet 2021 12:00:00 AM EDT           10 MILLIGRAMS ORAL 

                         

active                                                      <td>Montelukast Sodi

um 10MG Oral 

Tablet</td><td>2021</td><td>Unknown</td><td>ORAL</td><td>DAILY</td><td>10 
MILLIGRAMS</td><td>2002</td><td>RxNorm</td><td>TAKE 10 MILLIGRAMS ORAL DAILY
</td>                                   [facility]

 

                          montelukast 10 MG Oral Tablet Montelukast Sodium 10MG 

Oral Tablet Montelukast 

Sodium 10MG Oral Tablet 2021 12:00:00 AM EDT           10 MILLIGRAMS ORAL 

                         

active                                                      <td>Montelukast Sodi

um 10MG Oral 

Tablet</td><td>2021</td><td>Unknown</td><td>ORAL</td><td>DAILY</td><td>10 
MILLIGRAMS</td><td>2002</td><td>RxNorm</td><td>TAKE 10 MILLIGRAMS ORAL DAILY
</td>                                   [facility]

 

                          montelukast 10 MG Oral Tablet Montelukast Sodium 10MG 

Oral Tablet Montelukast 

Sodium 10MG Oral Tablet 2021 12:00:00 AM EDT           10 MILLIGRAMS ORAL 

                         

active                                                      <td>Montelukast Sodi

um 10MG Oral 

Tablet</td><td>2021</td><td>Unknown</td><td>ORAL</td><td>DAILY</td><td>10 
MILLIGRAMS</td><td>2002</td><td>RxNorm</td><td>TAKE 10 MILLIGRAMS ORAL DAILY
</td>                                   [facility]

 

                          Levothyroxine Sodium 0.075 MG Oral Tablet Levothyroxin

e 75MCG Oral Tablet 

Levothyroxine 75MCG Oral Tablet 2021 12:00:00 AM EDT           75 ug     O

RAL                          

active                                                      <td>Levothyroxine 75

MCG Oral 

Tablet</td><td>2021</td><td>Unknown</td><td>ORAL</td><td>DAILY</td><td>75 
MCG</td><td>094018</td><td>RxNorm</td><td>TAKE 75 MCG ORAL DAILY</td> [facility]

 

                                        Omeprazole 20 MG Delayed Release Oral Ca

psule Omeprazole 20MG Oral Capsule, 

Delayed Release           Omeprazole 20MG Oral Capsule, Delayed Release 20 

12:00:00 AM EDT         20 MILLIGRAMS ORAL                    active            

      <td>Omeprazole 20MG Oral 

Capsule, Delayed 
Release</td><td>2021</td><td>Unknown</td><td>ORAL</td><td>DAILY</td><td>20
 MILLIGRAMS</td><td>400738</td><td>RxNorm</td><td>TAKE 20 MILLIGRAMS ORAL 
DAILY</td>                              [facility]

 

        Flonase 0.05MG/Actuation Nasal Spray         2021 12:00:00 AM EDT 

        2 mL                            

active                                                      <td>Flonase 0.05MG/A

ctuation Nasal 

[place]</td><td>2021</td><td>Unknown</td><td>BOTH NARES</td><td>TWICE A 
DAY</td><td>2 SPRAY</td><td>5740748</td><td>RxNorm</td><td>2 SPRAY BOTH NARES 
TWICE A DAY</td>                        [facility]

 

        Flonase 0.05MG/Actuation Nasal Spray         2021 12:00:00 AM EDT 

        2 mL                            

active                                                      <td>Flonase 0.05MG/A

ctuation Nasal 

[place]</td><td>2021</td><td>Unknown</td><td>BOTH NARES</td><td>TWICE A 
DAY</td><td>2 SPRAY</td><td>5143189</td><td>RxNorm</td><td>2 SPRAY BOTH NARES 
TWICE A DAY</td>                        [facility]

 

                          Levothyroxine Sodium 0.075 MG Oral Tablet Levothyroxin

e 75MCG Oral Tablet 

Levothyroxine 75MCG Oral Tablet 2021 12:00:00 AM EDT           75 ug     O

RAL                          

active                                                      <td>Levothyroxine 75

MCG Oral 

Tablet</td><td>2021</td><td>Unknown</td><td>ORAL</td><td>DAILY</td><td>75 
MCG</td><td>023699</td><td>RxNorm</td><td>TAKE 75 MCG ORAL DAILY</td> [facility]

 

                                        Albuterol 0.83 MG/ML Inhalant Solution A

lbuterol Sulfate 0.083% Inhalation 

Solution                  Albuterol Sulfate 0.083% Inhalation Solution  12:00:00 AM EDT

                2 mL    INHALATION                 active                  <td>A

lbuterol Sulfate 0.083% Inhalation 

Solution</td><td>2021</td><td>Unknown</td><td>INHALATION</td><td>AS NEEDED
 EVERY 4 HR</td><td>2 PUFF</td><td>644785</td><td>RxNorm</td><td>PUFF 2 PUFF 
INHALATION AS NEEDED EVERY 4 HR</td>    [facility]

 

                          montelukast 10 MG Oral Tablet Montelukast Sodium 10MG 

Oral Tablet Montelukast 

Sodium 10MG Oral Tablet 2021 12:00:00 AM EDT           10 MILLIGRAMS ORAL 

                         

active                                                      <td>Montelukast Sodi

um 10MG Oral 

Tablet</td><td>2021</td><td>Unknown</td><td>ORAL</td><td>DAILY</td><td>10 
MILLIGRAMS</td><td>2002</td><td>RxNorm</td><td>TAKE 10 MILLIGRAMS ORAL DAILY
</td>                                   [facility]

 

                                        Omeprazole 20 MG Delayed Release Oral Ca

psule Omeprazole 20MG Oral Capsule, 

Delayed Release           Omeprazole 20MG Oral Capsule, Delayed Release 20 

12:00:00 AM EDT         20 MILLIGRAMS ORAL                    active            

      <td>Omeprazole 20MG Oral 

Capsule, Delayed 
Release</td><td>2021</td><td>Unknown</td><td>ORAL</td><td>DAILY</td><td>20
 MILLIGRAMS</td><td>1980</td><td>RxNorm</td><td>TAKE 20 MILLIGRAMS ORAL 
DAILY</td>                              [facility]

 

                                        Albuterol 0.83 MG/ML Inhalant Solution A

lbuterol Sulfate 0.083% Inhalation 

Solution                  Albuterol Sulfate 0.083% Inhalation Solution  12:00:00 AM EDT

                2 mL    INHALATION                 active                  <td>A

lbuterol Sulfate 0.083% Inhalation 

Solution</td><td>2021</td><td>Unknown</td><td>INHALATION</td><td>AS NEEDED
 EVERY 4 HR</td><td>2 PUFF</td><td>539255</td><td>RxNorm</td><td>PUFF 2 PUFF 
INHALATION AS NEEDED EVERY 4 HR</td>    [facility]

 

                                        benzonatate 100 MG Oral Capsule [Tessalo

n Perles] Tessalon Perles 100MG Oral 

Capsule, Liquid Filled    Tessalon Perles 100MG Oral Capsule, Liquid Filled 

2021 12:00:00 AM EDT         1 CAPSULE BY MOUTH                 active    

              <td>Tessalon 

Perles 100MG Oral Capsule, Liquid 
Filled</td><td>2021</td><td>Unknown</td><td>BY MOUTH</td><td>EVERY 
8HR</td><td>1 CAPSULE</td><td>740969</td><td>RxNorm</td><td>TAKE 1 CAPSULE BY 
MOUTH EVERY 8HR</td>                    [facility]

 

                                        benzonatate 100 MG Oral Capsule [Tessalo

n Perles] Tessalon Perles 100MG Oral 

Capsule, Liquid Filled    Tessalon Perles 100MG Oral Capsule, Liquid Filled 

2021 12:00:00 AM EDT         1 CAPSULE BY MOUTH                 active    

              <td>Tessalon 

Perles 100MG Oral Capsule, Liquid 
Filled</td><td>2021</td><td>Unknown</td><td>BY MOUTH</td><td>EVERY 
8HR</td><td>1 CAPSULE</td><td>259765</td><td>RxNorm</td><td>TAKE 1 CAPSULE BY 
MOUTH EVERY 8HR</td>                    [facility]

 

                          Prednisone 20 MG Oral Tablet predniSONE 20MG Oral Tabl

et predniSONE 20MG Oral 

Tablet 2021 12:00:00 AM EDT        2 TABLET BY MOUTH               active 

              

<td>predniSONE 20MG Oral Tablet</td><td>2021</td><td>Unknown</td><td>BY 
MOUTH</td><td>DAILY</td><td>2 TABLET</td><td>651927</td><td>RxNorm</td><td>TAKE 
2 TABLET BY MOUTH DAILY FOR THE NEXT THREE DAYS</td> [facility]

 

                          Prednisone 20 MG Oral Tablet predniSONE 20MG Oral Tabl

et predniSONE 20MG Oral 

Tablet 2021 12:00:00 AM EDT        2 TABLET BY MOUTH               active 

              

<td>predniSONE 20MG Oral Tablet</td><td>2021</td><td>Unknown</td><td>BY 
MOUTH</td><td>DAILY</td><td>2 TABLET</td><td>033734</td><td>RxNorm</td><td>TAKE 
2 TABLET BY MOUTH DAILY FOR THE NEXT THREE DAYS</td> [facility]

 

        Prednisone 10 MG Oral Tablet Prednisone 2021 12:00:00 AM EDT      

           ORAL                    

completed                                                       MEDENT ([facility], )

 

       Covid-19 vaccine, Unspecified        2021 12:00:00 AM EST          

                          completed  

                                                            [facility] ([facility], )

 

                                        Medication administered onsite 

 

           Azithromycin 500 MG Oral Tablet Azithromycin 2021 12:00:00 AM E

ST                       ORAL

                              active                                  [facility] ([facility], )

 

        Prednisone 10 MG Oral Tablet Prednisone 2021 12:00:00 AM EST      

           ORAL                    

completed                                                       [facility] ([facility], )

 

                          120 ACTUAT mometasone furoate 0.2 MG/ACTUAT Dry Powder

 Inhaler [Asmanex] Asmanex

 Twisthaler 120 Metered Doses 2020 12:00:00 AM EST                 ORAL   

                 active           

                                                    MEDENT ([facility]

actice, )

 

                          120 ACTUAT Fluticasone propionate 0.22 MG/ACTUAT Meter

ed Dose Inhaler [Flovent] 

Flovent HFA 10/28/2020 12:00:00 AM EDT             RESPIRATORY             compl

eted                   

MEDENT ([facility], )

 

        Prednisone 10 MG Oral Tablet Prednisone 10/28/2020 12:00:00 AM EDT      

                                   

completed                                                       MED[facility] ([facility], )



                                                                                
                                                                                
                                                                                
                                                                                
                                                                                
                                                                                
                                                                                
                                                                                
                                                                                
                                                                                
                                                                                
                                                                                
        



Insurance Providers

          



             Payer name   Policy type / Coverage type Policy ID    Covered party

 ID Covered 

party's relationship to dunlap Policy Dunlap             Plan Information

 

          OTHER     B         [id number]           Self                [id number]

 

                [employer] [place])    Workers Compensation [id number]      

[id number] Self                                    W

[id number]

 

           Medicaid [facility] Part B [id number]   [id number].

08894.0 Self       

                                        [id number]

 

           Medicaid [facility] Part B [id number]   [id number].

74569.0 Self       

                                        [id number]

 

           Medicaid [facility] Part B [id number]   [id number].

34488.0 Self       

                                        [id number]

 

          MEDICARE  A         [id number]           Self                [id number]

A

 

                              [id number]                               [id number]

A

 

          MEDICARE            [id number]           SP                  [id number]

[id number]

 

          MEDICARE            [id number]           SP                  [id number]

A

 

          MEDICARE            [id number]           SP                  [id number]

A

 

                [place]icare [place] [employer] Part B  [id number]      

[id number] Self                                    2

[id number]

 

                Medicare [facility] Part B  [id number]      

[id number] Self                                    2

[id number]

 

                [place]are [place] Medigap Part B  [id number]      

[id number] Self                                    2

[id number]

 

          MEDICAID  M         [id number]            Self                [id number]

 

          [facility]           [id number]           SP             

     [id number]

 

                [facility] Medicare Advantage Commercial      [id number]       

[id number] Self                                    1

[id number]

 

                [employer] Medicare Commercial      [id number]       

[id number] Self                                    

[id number]

 

          MEDICAID            [id number]            SP                  [id number]

 

           [place]icaid [place] Med[place] [id number]   [id number].

67171.0 Self       

                                        [id number]

 

          [facility] MEDICARE           [id number]           SP         

         [id number]

 

          [facility] MMC           [id number]           und

efined           [id number]

 

          [facility]           [id number]           undefin

ed           [id number]

 

          [facility]           [id number]           [facility] 

          [id number]

 

                Medicare [facility] Medicare Primary [id number]      

[id number] Self                                   

 [id number]

 

                [facility] Medigap Part B  [id number]       

[id number] Self                                   

 [id number]

 

             [place]icaid [place]  Med[place] [id number]     .[id number]

[id number] 

Self                                                [id number]

 

                Workers Comp (WC) Workers Compensation [id number]

[id number] 

[id number] Self                                    

[id number]

 

                [employer] [place] Medigap Part B  [id number]       

[id number] Self                                   

 [id number]

 

                [employer] Medigap Part B  [id number]      

.[id number] Self                                    1

[id number]

 

          [employer]           [id number]           undefined   

        [id number]

 

          Medicaid  Medicaid  [id number]  .[id number] S

elf                

[id number]

 

             [facility] [facility] Dual Plan Commercial   [id number]    [id number]

[id number] 

Self                                                [id number]

 

             [place]icaid [place]  Med[place] [id number]     .[id number]

[id number] 

Self                                                [id number]

 

                Medicare [place] Medicare Primary [id number]      

.[id number] Self                                   

 [id number]

 

                [facility]/MCR Health Maintenance Organization (HMO) 

[id number]       

[id number] Self                                   

 [id number]

 

          [facility] DUAL COMPLET MCRADVANT [id number]           S        

           [id number]

 

          MEDICAID            [id number]            SP                  [id number]

 

          'S ADMINISTRATION           [id number]           SP             

     [id number]

 

          [facility]) O         [id number] S              

     [id number]

 

          MEDICAID  M         [id number]  [id number] S                   [id number]

 

             [place]  Med[place] [id number]     .[id number]

.[id number] Self

                                                    [id number]

 

                [employer](Medicare) Commercial      [id number]       

.[id number] Self                                    

[id number]

 

                Workers Comp (WC) Workers Compensation [id number]

[id number] 

.[id number] Self                                    

[id number]

 

          [employer] [place]  O         [id number] S                   [id number]

 

                [employer] Medigap Part B  [id number]      

.[id number] Self                                    1

[id number]

 

          MEDICARE-OP           [id number]           undefined           [id number]

[id number]

 

           [place] [id number]   [id number].

76403.0 Self       

                                        [id number]

 

             [place]  Med[place] [id number]     [id number]

[id number] Self

                                                    [id number]

 

                Medicare [facility] Medicare Primary [id number]      

[id number] Self                                    

[id number]

 

          [facility] DUAL COMPLETE O         [id number] S                   [id number]

[id number]

 

                Workers Comp (WC) Workers Compensation [id number]

[id number] 

[id number] Self                                    

[id number]

 

          [employer] [place] CCN           [id number]           SP                  [id number]

[id number]

 

                [employer] Medigap Part B  [id number]      

[id number] Self                                    1

[id number]

 

          [facility]            [id number]            SP                  [id number]

 

           [place] Med[place] [id number]   [id number].

13633.0 Self       

                                        [id number]

 

          [facility] MCRO           [id number]           SP             

     [id number]

 

          [facility] DUAL COMPLET MCRADVANT [id number]           S        

           [id number]

 

          [employer]           [id number]           SP                  [id number]

[id number]

 

             [place]  Med[place] [id number]     [id number]

[id number] 

Self                                                [id number]

 

                Medicare [facility] Medicare Primary [id number]      

[id number] Self                                   

 [id number]

 

          Medicaid  Medigap Part B [id number]  [id number]

[id number] Self                

[id number]

 

                Medicare (Part B) Medicare Primary [id number]      

[id number] Self                                    2

[id number]

 

          MEDICARE  C         [id number] S                   [id number]

A

 

          [employer] C         [id number] S              

     [id number]

 

          [facility] - CLINIC           [id number]            undefined

           [id number]

 

          [employer]           [id number]           S        

           [id number]

 

          [facility] - PHYSICIAN           [id number]            undefi

sarah           [id number]

 

             [place]  Med[place] [id number]     [id number]

[id number] Self

                                                    [id number]

 

                Medicare [employer] Medicare Primary [id number]      

[id number] Self                                    

[id number]

 

          MEDICAID            [id number]            SP                  [id number]

 

          [facility] ADMINISTRATION-OP           [id number]           undefined   

        [id number]

 

          [facility]         [id number] S                   [id number]

 

          [facility]         [id number] S                   [id number]

[id number]

 

                              [id number]                               [id number]

 

                [employer] Medicare Commercial      [id number]     

[id number] Self                                   

 [id number]

 

          [facility] - OP           [id number]            undefined    

       [id number]

 

          MEDICARE COMPLETE           [id number]           SP                  [id number]

[id number]

 

          MEDICARE COMPLETE           [id number]           SP                  [id number]

[id number]

 

          [facility] UT-CLINIC           [id number]           undefined   

        [id number]

 

          [facility] UT-OP           [id number]           undefined       

    [id number]

 

          [facility] -[facility]           [id number]           undefined  

         [id number]



                                                                                
                                                                                
                                                                                
                                                                                
                                                                                
                                                                                
                                                                                
                                                                                
                                                                                
                                                                                
                                                                                
        



Problems, Conditions, and Diagnoses

          



           Code       Display Name Description Problem Type Effective Dates Data

 Source(s)

 

                          V24777                    Encounter for observation fo

r suspected exposure to other biological 

agents ruled out                        Encounter for observation for suspected 

exposure to other 

biological agents ruled out Diagnosis           10/21/2021 01:29:00 PM EDT [facility]

 

                 Other fatigue Other fatigue Diagnosis  10/21/2021 11:36:00

 AM EDT [facility]

 

           R059       Cough, unspecified Cough, unspecified Diagnosis  10/21/202

1 11:36:00 AM EDT 

[facility]

 

                     Shortness of breath Shortness of breath Diagnosis    1

 11:36:00 AM 

EDT                                     [facility]

 

                    [id number]              Unspecified asthma with (acute) exacerba

tion Unspecified asthma with 

(acute) exacerbation Diagnosis           10/21/2021 11:36:00 AM T [facility]

 

                     Acute bronchospasm Acute bronchospasm Diagnosis    10/

 11:39:00 AM EDT

                                        [facility]

 

                    R030                Elevated blood-pressure reading, without

 diagnosis of hypertension Elevated

 blood-pressure reading, without diagnosis of hypertension Diagnosis            

     10/05/2021 

12:21:00 PM EDT                         [facility]

 

             I10          Essential (primary) hypertension Essential (primary) h

ypertension Diagnosis    

10/05/2021 12:21:00 PM [facility]

 

                [id number]          Unspecified asthma, uncomplicated Unspecified as

thma, uncomplicated 

Diagnosis                 10/05/2021 11:47:00 AM [facility]

 

             [id number]       Other muscle spasm Other muscle spasm Diagnosis    10/

2021 11:47:00 AM 

[facility]

 

             J45.991      Cough variant asthma COUGH VARIANT ASTHMA Diagnosis   

 2021 03:15:00 

PM [facility]

 

                    Z20.822             CONTACT WITH AND (SUSPECTED) EXPOSURE TO

 COVID-19 CONTACT WITH AND 

(SUSPECTED) EXPOSURE TO COVID-19 Diagnosis           2021 11:56:00 AM [facility]

 

                    Z01.812             Encounter for preprocedural laboratory e

xamination ENCOUNTER FOR 

PREPROCEDURAL LABORATORY EXAMINATION Diagnosis           2021 11:56:00 AM 

[facility]

 

           R05        Cough      Cough      Diagnosis  2021 07:42:00 PM ED

T [facility]

 

                E559            Vitamin D deficiency, unspecified Vitamin D defi

ciency, unspecified 

Diagnosis                 2021 10:43:00 AM T [facility]

 

             E039         Hypothyroidism, unspecified Hypothyroidism, unspecifie

d Diagnosis    

2021 10:43:00 AM [facility]

 

             E785         Hyperlipidemia, unspecified Hyperlipidemia, unspecifie

d Diagnosis    

2021 10:43:00 AM [facility]

 

             R42          Dizziness and giddiness Dizziness and giddiness Diagno

sis    2021 

10:43:00 AM EDT                         [facility]

 

             R197         Diarrhea, unspecified Diarrhea, unspecified Diagnosis 

   2021 10:43:00 

AM EDT                                  [facility]

 

                                   Severe persistent asthma, uncomplicated 

Severe persistent asthma, 

uncomplicated       Diagnosis           2021 01:17:00 PM EDT [facility]

 

                    [id number]              Migraine, unspecified, not intractable, 

without status migrainosus 

Migraine, unspecified, not intractable, without status migrainosus Diagnosis    

             

2021 09:31:00 AM EDT              [facility]

 

                                   Encounter for screening for other disord

er Encounter for screening for 

other disorder      Diagnosis           2021 11:12:00 AM EDT [facility]

 

                     Sleep related leg cramps Sleep related leg cramps Diag

nosis    2021 

12:11:00 PM EDT                         [facility]

 

                                   Severe persistent asthma with (acute) ex

acerbation Severe persistent 

asthma with (acute) exacerbation Diagnosis           2021 12:11:00 PM EDT 

[facility]

 

                    Z131                Encounter for screening for diabetes jam

litus Encounter for screening for 

diabetes mellitus   Diagnosis           2021 11:42:00 AM EDT [facility]

 

                R309            Painful micturition, unspecified Painful micturi

tion, unspecified Diagnosis

                          05/10/2021 01:54:00 PM EDT [facility]

 

             R319         Hematuria, unspecified Hematuria, unspecified Diagnosi

s    05/10/2021 01:54:00

 PM EDT                                 [facility]

 

                                               Contact with and (suspected)

 exposure to environmental tobacco smoke 

(acute) (chronic)                       Contact with and (suspected) exposure to

 environmental tobacco

 smoke (acute) (chronic) Diagnosis           2021 03:01:00 PM EDT [facility]

 

                                   Moderate persistent asthma with (acute) 

exacerbation Moderate persistent 

asthma with (acute) exacerbation Diagnosis           2021 03:01:00 PM EDT 

[facility]

 

                                   Mild persistent asthma with (acute) exac

erbation Mild persistent asthma 

with (acute) exacerbation Diagnosis           2020 01:21:00 PM EST [facility]

 

             R001         Bradycardia, unspecified Bradycardia, unspecified Diag

nosis    09/15/2020 

02:16:00 PM EDT                         [facility]

 

                    J45.50              Uncomplicated severe persistent asthma U

ncomplicated severe persistent 

asthma              Problem             10/28/2020 12:00:00 AM EDT [facility] ([facility])



                                                                                
                                                                                
                                                                                
                                                                                
                                                                                
        



Surgeries/Procedures

          



             Procedure    Description  Date         Indications  Data Source(s)

 

             Spirometry                2021 12:00:00 AM EDT              

[facility] ([facility])

 

             OFFICE OUTPATIENT VISIT 25 MINUTES              2021 12:00:00

 AM EDT              [facility] 

([facility])

 

             Spirometry                2021 12:00:00 AM EDT              

[facility] ([facility])

 

             OFFICE OUTPATIENT VISIT 25 MINUTES              2021 12:00:00

 AM EDT              [facility] 

([facility])

 

             Spirometry                2021 12:00:00 AM EDT              

[facility] ([facility])

 

             OFFICE OUTPATIENT VISIT 25 MINUTES              2021 12:00:00

 AM EDT              [facility] 

([facility])

 

             Spirometry                2021 12:00:00 AM EDT              M

[facility] ([facility])

 

             OFFICE OUTPATIENT VISIT 15 MINUTES              2021 12:00:00

 AM EDT              [facility] 

([facility])

 

             Spirometry                2021 12:00:00 AM EST              

[facility] ([facility])

 

             Bronchospasm Evaluation              2020 12:00:00 AM EST    

          [facility] ([facility])

 

                    Maximum Breathing Capacity, Maximal Voluntary Ventilation   

                  2020 12:00:00 

AM EST                                              [facility] ([facility])

 

                    Plethysmography Determination Lung Volumes & Per Airway Resi

st                     2020 

12:00:00 AM EST                                     [facility] ([facility])

 

             DIFFUSING CAPACITY              2020 12:00:00 AM EST         

     [facility] ([facility])

 

             Spirometry                10/28/2020 12:00:00 AM EDT              M

[facility] ([facility])



                                                                                
                                                                                
                                                          



Results

          



                    ID                  Date                Data Source

 

                    [id number]    10/21/2021 12:10:00 PM EDT [facility]









          Name      Value     Range     Interpretation Code Description Data Josefina

rce(s) Supporting 

Document(s)

 

          SARS-CoV-2 RNA Nph Ql UD+non-probe NOT DETECTED                      

         [facility]     

 

                                        This lab was ordered by [facility]IS LI and reported by [facility]. 









                    ID                  Date                Data Source

 

                    [id number]     10/21/2021 12:10:00 PM EDT [facility]









          Name      Value     Range     Interpretation Code Description Data Josefina

rce(s) Supporting 

Document(s)

 

          RESP PROFILE RP2.1 NASAL PCR                                         [facility]  

 

                                                 \BLDo\RESPIRATORY PROFILE NASAL

 PHARYNGEAL BY PCR\BLDx\           

\BLDo\DETECTED             _NONE_________________________\BLDx\          
10/21/21.1436.[facility].           \BLDo\EQUIVOCAL            
_NONE_________________________\BLDx\          10/21/21.1436.[facility].                
         VIRUSES 

 

          ADENOVIRUS NOT DETECTED NORMAL: NOT DETECTED                     [facility]  

 

          CORONAVIRUS 229E NOT DETECTED NORMAL: NOT DETECTED                    

 [facility]  

 

          CORONAVIRUS HKU1 NOT DETECTED NORMAL: NOT DETECTED                    

 [facility]  

 

          CORONAVIRUS NL63 NOT DETECTED NORMAL: NOT DETECTED                    

 [facility]  

 

          CORONAVIRUS OC43 NOT DETECTED NORMAL: NOT DETECTED                    

 [facility]  

 

          46770-8   NOT DETECTED NORMAL: NOT DETECTED                     [facility]  

 

                                                    REPORT TO DEPARTMENT OF HEAL

TH 

 

          HUMAN METAPNEUMO NOT DETECTED NORMAL: NOT DETECTED                    

 [facility]  

 

          HUMAN RHINO/ENTERO NOT DETECTED NORMAL: NOT DETECTED                  

   [facility]  

 

                                        NOT DETECTEDNOT DETECTEDNOT DETECTEDNOT 

DETECTED 

 

          PARAINFLUENZA V3 NOT DETECTED NORMAL: NOT DETECTED                    

 [facility]  

 

                                        NOT DETECTED 

 

          RSV       NOT DETECTED NORMAL: NOT DETECTED                     [facility]  

 

                                                                 BACTERIANOT DET

ECTEDNOT DETECTEDNOT DETECTEDNOT 

DETECTED           TESTING PERFORMED USING THE [employer] RP2.1 
MULTIPLEXED           NUCLEIC ACID TEST. THIS TEST HAS NOT BEEN FDA CLEARED OR 
APPROVED;           THIS TEST HAS BEEN AUTHORIZED BY FDA UNDER AN EUA FOR USE BY
           AUTHORIZED LABORATORIES; THIS TEST HAS BEEN AUTHORIZED ONLY FOR THE  
         DETECTION AND DIFFERENTATION OF NUCLEI ACID OF SARS-CoV-2 FROM         
  MULTIPLE RESPIRATORY VIRAL AND BACTERIAL ORGANIMS; AND THIS TEST           IS 
ONLY AUTHORIZED FOR THE DURATION OF THE DECLARATION THAT           CIRCUMSTANCES
 EXIST JUSTIFYING THE AUTHORIZATION OF EMERGENCY USE OF           IN VITRO 
DIAGNOSTIC TESTS FOR THE DETECTION AND/OR DIAGNOSIS OF           COVID-19 UNDER 
SECTION 564(b)(1) OF THE ACT, 21 U.S.C. 360bbb-3(b)           (1), UNLESS THE 
AUTHORIZATION IS TERMINATED OR REVOKED SOONER. 









                    ID                  Date                Data Source

 

                    [id number]     10/05/2021 12:25:00 PM EDT [facility]









          Name      Value     Range     Interpretation Code Description Data Josefina

rce(s) Supporting 

Document(s)

 

                Theophylline [Mass/volume] in Serum or Plasma 1.2 ug/mL       10

.0 - 20.0     Below low 

normal                                  [facility]  

 

                                                                   \BLDo\THEOPHY

LLINE TOXIC LEVEL\BLDx\                 

   THEOPHYLLINE LEVELS ABOVE 20.0 mcg/mL MAY BE TOXIC. 









                    ID                  Date                Data Source

 

                    [id number]     10/05/2021 12:25:00 PM EDT [facility]









          Name      Value     Range     Interpretation Code Description Data Josefina

rce(s) Supporting 

Document(s)

 

           Magnesium [Mass/volume] in Serum or Plasma 2.2 mg/dL  1.8 - 2.4      

                  [facility]                                 









                    ID                  Date                Data Source

 

                    [id number]     10/05/2021 12:25:00 PM EDT [facility]









          Name      Value     Range     Interpretation Code Description Data Josefina

rce(s) Supporting 

Document(s)

 

          COMPREHENSIVE CHEM PROFILE                                         [facility]  

 

                                            COMPREHENSIVE METABOLIC PANEL 

 

           Sodium [Moles/volume] in Serum or Plasma 144 mEq/L  136 - 145        

                [facility]                                 

 

           Potassium [Moles/volume] in Serum or Plasma 4.6 mEq/L  3.5 - 5.1     

                   [facility]                                

 

           Chloride [Moles/volume] in Serum or Plasma 105 mEq/L  98 - 107       

                  [facility]                                 

 

                Carbon dioxide, total [Moles/volume] in Serum or Plasma 29.8 mEq

/L      21.0 - 32.0      

                                        [facility]  

 

           Glucose [Mass/volume] in Serum or Plasma 98 mg/dL   70 - 100         

                [facility]                                 

 

                Urea nitrogen [Mass/volume] in Serum or Plasma 19 mg/dL        7

 - 18          Above high normal

                                        [facility]  

 

          CREATININE SERUM 1.06 mg/dL 0.70 - 1.30                     [facility]  

 

          AGE       51 yrs                                  [facility]

l  

 

          HEIGHT    R                                       [facility]

l  

 

          eGFR NON-AFR AMR >60                                     [facility]  

 

          eGFR AFR AMR >60                                     [facility]

ital  

 

          BUN/CREAT 18        6 - 25                        [facility]

l  

 

           Protein [Mass/volume] in Serum or Plasma 6.5 g/dL   6.0 - 8.3        

                [facility]                                 

 

           Albumin [Mass/volume] in Serum or Plasma 3.6 g/dL   3.8 - 5.4  Below 

low normal            

[facility]                    

 

          GLOBULIN  2.9 g/dL  2.0 - 4.0                     [facility]

l  

 

          A/G RATIO 1.2       0.8 - 2.0                     [facility]

l  

 

             Calcium [Mass/volume] in Serum or Plasma 8.5 mg/dL    8.8 - 10.2   

Below low normal  

                          [facility]      

 

           Bilirubin.total [Mass/volume] in Serum or Plasma 0.5 mg/dL  0.2 - 1.0

                        [facility]                           

 

           Bilirubin.direct [Mass/volume] in Serum or Plasma 0.1 mg/dL  0.0 - 0.

2                        

[facility]                    

 

          INDIRECT BILI 0.4 mg/dL 0.0 - 1.1                     [facility]

pital  

 

          ALK PHOSPHATASE 65 U/L    40 - 129                      [facility]

ospital  

 

                                        Aspartate aminotransferase [Enzymatic ac

tivity/volume] in Serum or Plasma by 

With P-5'-P 42 IU/L    7 - 37     Above high normal            [facility]

ital  

 

                                        Alanine aminotransferase [Enzymatic acti

vity/volume] in Serum or Plasma by With 

P-5'-P     33 IU/L    12 - 78                          [facility]  

 

          ANION GAP 9         7 - 15                        [facility]

l  

 

                                                          Estimated GFR referenc

e range: >60ml/min/1.73m               

>18 years: Calculated using IDMS traceable Study Equation           <18 years: 
Calculated using IDMS tracable Bedside Schartz Equation 









                    ID                  Date                Data Source

 

                    [id number]     10/05/2021 12:25:00 PM EDT [facility]









          Name      Value     Range     Interpretation Code Description Data Josefina

rce(s) Supporting 

Document(s)

 

          CBC                                               [facility]

l  

 

                                            COMPLETE BLOOD COUNT 

 

           Leukocytes [#/volume] in Blood by Automated count 6.7 K/uL   4.0 - 10

.0                       

[facility]                    

 

           Erythrocytes [#/volume] in Blood by Automated count 4.89 M/uL  4.30 -

 6.10                       

[facility]                    

 

           Hemoglobin [Mass/volume] in Blood 13.8 g/dL  13.5 - 17.5             

          [facility]                                 

 

           Hematocrit [Volume Fraction] of Blood by Automated count 44.6 %     3

9.0 - 50.0                       

[facility]                    

 

                    Erythrocyte mean corpuscular volume [Entitic volume] by Auto

mated count 91.2 fL             

80.0 - 96.0                                     [facility]  

 

                          Erythrocyte mean corpuscular hemoglobin [Entitic mass]

 by Automated count 28.2 

pg           26.0 - 34.0                            [facility]  

 

                                        Erythrocyte mean corpuscular hemoglobin 

concentration [Mass/volume] by Automated

 count     30.9 g/dL  32.0 - 36.0 Below low normal            [facility]

[facility]  

 

             Erythrocyte distribution width [Ratio] by Automated count 13.2 %   

    11.6 - 14.8                

                          [facility]      

 

           Platelets [#/volume] in Blood by Automated count 292 K/uL   150 - 450

                        [facility]                            

 

                    Platelet mean volume [Entitic volume] in Blood by Automated 

count 8.5 fL              7.1 - 

10.4                                            [facility]  

 

           Neutrophils [#/volume] in Blood by Automated count 3.83 K/uL  1.70 - 

7.70                       

[facility]                    

 

           Lymphocytes [#/volume] in Blood by Automated count 2.17 K/uL  1.50 - 

6.00                       

[facility]                    

 

           Monocytes [#/volume] in Blood by Automated count 0.49 K/uL  0.00 - 1.

00                       

[facility]                    

 

           Eosinophils [#/volume] in Blood by Automated count 0.16 K/uL  0.03 - 

0.48                       

[facility]                    

 

           Basophils [#/volume] in Blood by Automated count 0.04 K/uL  0.01 - 0.

08                       

[facility]                    

 

                                        0.01 

 

           Urinalysis macro (dipstick) panel - Urine 0.000 10^3/uL 0.000 - 0.012

                       

[facility]                    

 

           Neutrophils/100 leukocytes in Blood by Automated count 57.2 %     42.

0 - 75.0                       

[facility]                    

 

           Lymphocytes/100 leukocytes in Blood by Automated count 32.4 %     15.

0 - 41.0                       

[facility]                    

 

           Monocytes/100 leukocytes in Blood by Automated count 7.3 %      0.0 -

 12.0                       

[facility]                    

 

           Eosinophils/100 leukocytes in Blood by Automated count 2.4 %      0.0

 - 7.0                        

[facility]                    

 

                                        0.60.10 

 

          NRBC      0.0 %                                   [facility]

l  

 

          MANUAL DIFF NOT INDICATED                               [facility]

ospital  

 

          RBC MORPH NOT INDICATED                               [facility]

pital  









                    ID                  Date                Data Source

 

                    [id number]         2021 11:22:00 AM EDT [facility] ([facility])









          Name      Value     Range     Interpretation Code Description Data Josefina

rce(s) Supporting 

Document(s)

 

          FVC-Pred  5.23 L                                  [facility] ([facility])  

 

          PDFReport Laboratory test result                               [facility] 

([facility])  

 

          FVC-Pre   1.72 L                                  [facility] ([facility])  

 

          FVC-LLN   4.27 L                                  [facility] ([facility])  

 

          FVC-%Pred-Pre 32 L                                    [facility] ([facility])  

 

          Fev1-Pred 4.06 L                                  [facility] ([facility])  

 

          Fev1-LLN  3.24 L                                  [facility] ([facility])  

 

          Fev1-%Pred-Pre 29 L                                    [facility] ([facility])  

 

          Fev1-Pre  1.21 L                                  [facility] ([facility])  

 

          Fev6-Pred 5.05 L                                  [facility] ([facility])  

 

          Fev6-Pre  1.72 L                                  [facility] ([facility])  

 

          Fev6-%Pred-Pre 34 L                                    [facility])  

 

          Fev6-LLN  4.11 L                                  [facility] ([facility])  

 

          Fev1fvc-Pre 70 %                                    [facility] ([facility])  

 

          Fev1fvc-Pred 78 %                                    [facility] ([facility])  

 

          Rue2olk-%Pred-Pre 90 %                                    [facility] ([facility])  

 

          Kpu4jcx-ZYX 68 %                                    [facility] ([facility])  

 

          Fev6fvc-Pred 97 %                                    [facility])  

 

          Fev6fvc-Pre 100 %                                   [facility] ([facility])  

 

          FEFMax-Pred 10.05 L/E/sec                               [facility] ([facility])  

 

          FEFMax-Pre 4.72 L/E/sec                               [facility] ([facility])  

 

          Fds8ixt-%Pred-Pre 103 %                                   [facility] ([facility])  

 

          FEFMax-%Pred-Pre 46 L/E/sec                               [facility] ([facility])  

 

          Fef2575-Pre 0.68 L/E/sec                               [facility] ([facility])  

 

          Fef2575-Pred 3.57 L/E/sec                               [facility] ([facility])  

 

          FEFMax-LLN 7.66 L/E/sec                               [facility] ([facility])  

 

          ExpTime-Pre 6.02 sec                                [facility] ([facility])  

 

          Nkf0209-RIV 1.92 L/E/sec                               [facility] ([facility])  

 

          Ytw5006-%Pred-Pre 19 L/E/sec                               [facility] ([facility])  

 

          Fev1fev6-Pred 80 %                                    [facility] ([facility])  

 

          Fev1fev6-Pre 70 %                                    [facility] ([facility])  

 

          Tkf8rde4-GMO 71 %                                    [facility] ([facility])  

 

          Wfj2cgz4-%Pred-Pre 87 %                                    [facility] ([facility])  









                    ID                  Date                Data Source

 

                    [id number]       2021 09:40:00 AM EDT [facility]









          Name      Value     Range     Interpretation Code Description Data Josefina

rce(s) Supporting 

Document(s)

 

          LCOVID-19 [facility]HEONIX ASSAY Negative                                [facility]

     

 

                                        This lab was ordered by [facility] and reported by [facility]. 









                    ID                  Date                Data Source

 

                    [id number]         2021 11:50:00 PM EDT [facility]

[facility]









          Name      Value     Range     Interpretation Code Description Data Josefina

rce(s) Supporting 

Document(s)

 

          COVID19 RHEONIX Negative  NEGATIVE  N                   [facility]

al  

 

                                        The Rheonix COVID-19 MDx Assay is an end

point RT-PCR assayintended for the 

qualitative detection of nucleic acid gvwmNCKZ-JzS-0 virus.  Positive results 
are indicative of thepresence of SARS-CoV-2 RNA; clinical correlation 
withpatient history and other diagnostic information isnecessary to determine 
patient infection status. Negativeresults do not preclude SARS-CoV-2 infection 
and should notbe used as the sole basis for patient management decisions. The 
[employer] [facility] MDx Assay is only for use under the Food andDrug Administration's 
Emergency Use Authorization. 









                    ID                  Date                Data Source

 

                    [id number]     2021 11:10:00 AM EDT [facility]









          Name      Value     Range     Interpretation Code Description Data Josefina

rce(s) Supporting 

Document(s)

 

          25-OH VITAMIN D 16.1 ng/mL 30.0 - 100 Below low normal           [facility]  

 

                                                              Deficient         

 < 20 ng/mL                      

Insufficient       20 - < 30 ng/mL                      Sufficient         30 - 
100 ng/mL      25-OH vitamin D reference values based on the Clinical Guidelines
      Subcommittee of the Endocrine Society Task Force.      Biotin can 
interfere with 25-OH Vitamin D results if taken 48 hours prior      to specimen 
collection. 









                    ID                  Date                Data Source

 

                    [id number]     2021 11:10:00 AM EDT [facility]









          Name      Value     Range     Interpretation Code Description Data Josefina

rce(s) Supporting 

Document(s)

 

          T4 FREE   0.96 ng/dL 0.76 - 1.46                     [facility]

ital  









                    ID                  Date                Data Source

 

                    [id number]     2021 11:10:00 AM EDT [facility]









          Name      Value     Range     Interpretation Code Description Data Josefina

rce(s) Supporting 

Document(s)

 

          TSH       2.97 uIU/mL 0.36 - 3.74                     [facility]

pital  









                    ID                  Date                Data Source

 

                    [id number]     2021 11:10:00 AM EDT [facility]









          Name      Value     Range     Interpretation Code Description Data Josefina

rce(s) Supporting 

Document(s)

 

          COMPREHENSIVE CHEM PROFILE                                         i

[facility]  

 

                                            COMPREHENSIVE METABOLIC PANEL 

 

           Sodium [Moles/volume] in Serum or Plasma 143 mEq/L  136 - 145        

                [facility]                                 

 

           Potassium [Moles/volume] in Serum or Plasma 4.0 mEq/L  3.5 - 5.1     

                   [facility]                                

 

           Chloride [Moles/volume] in Serum or Plasma 106 mEq/L  98 - 107       

                  [facility]                                 

 

                Carbon dioxide, total [Moles/volume] in Serum or Plasma 30.3 mEq

/L      21.0 - 32.0      

                                        [facility]  

 

           Glucose [Mass/volume] in Serum or Plasma 94 mg/dL   70 - 100         

                [facility]                                 

 

                Urea nitrogen [Mass/volume] in Serum or Plasma 20 mg/dL        7

 - 18          Above high normal

                                        [facility]  

 

          CREATININE SERUM 1.01 mg/dL 0.70 - 1.30                     [facility]  

 

          AGE       51 yrs                                  [facility]

l  

 

          HEIGHT    R                                       [facility]

l  

 

          eGFR NON-AFR AMR >60                                     [facility]  

 

          eGFR AFR AMR >60                                     [facility]

ital  

 

          BUN/CREAT 20        6 - 25                        [facility]  

 

           Protein [Mass/volume] in Serum or Plasma 6.6 g/dL   6.0 - 8.3        

                [facility]                                 

 

           Albumin [Mass/volume] in Serum or Plasma 3.4 g/dL   3.8 - 5.4  Below 

low normal            

[facility]                    

 

          GLOBULIN  3.2 g/dL  2.0 - 4.0                     [facility]

l  

 

          A/G RATIO 1.1       0.8 - 2.0                     [facility]  

 

             Calcium [Mass/volume] in Serum or Plasma 8.7 mg/dL    8.8 - 10.2   

Below low normal  

                          [facility]      

 

           Bilirubin.total [Mass/volume] in Serum or Plasma 0.5 mg/dL  0.2 - 1.0

                        [facility]                           

 

           Bilirubin.direct [Mass/volume] in Serum or Plasma 0.1 mg/dL  0.0 - 0.

2                        

[facility]                    

 

          INDIRECT BILI 0.4 mg/dL 0.0 - 1.1                     [facility]

pital  

 

          ALK PHOSPHATASE 73 U/L    40 - 129                      [facility]

ospital  

 

                                        Aspartate aminotransferase [Enzymatic ac

tivity/volume] in Serum or Plasma by 

With P-5'-P 21 IU/L    7 - 37                           [facility]  

 

                                        Alanine aminotransferase [Enzymatic acti

vity/volume] in Serum or Plasma by With 

P-5'-P     19 IU/L    12 - 78                          [facility]  

 

          ANION GAP 7         7 - 15                        [facility]

l  

 

                                                          Estimated GFR referenc

e range: >60ml/min/1.73m               

>18 years: Calculated using IDMS traceable Study Equation           <18 years: 
Calculated using IDMS tracable Bedside Schartz Equation 









                    ID                  Date                Data Source

 

                    [id number]     2021 11:10:00 AM EDT [facility]









          Name      Value     Range     Interpretation Code Description Data Josefina

rce(s) Supporting 

Document(s)

 

          CBC                                               [facility]

l  

 

                                            COMPLETE BLOOD COUNT 

 

           Leukocytes [#/volume] in Blood by Automated count 7.5 K/uL   4.0 - 10

.0                       

[facility]                    

 

           Erythrocytes [#/volume] in Blood by Automated count 4.67 M/uL  4.30 -

 6.10                       

[facility]                    

 

           Hemoglobin [Mass/volume] in Blood 13.4 g/dL  13.5 - 17.5 Below low no

rmal            

[facility]                    

 

           Hematocrit [Volume Fraction] of Blood by Automated count 42.5 %     3

9.0 - 50.0                       

[facility]                    

 

                    Erythrocyte mean corpuscular volume [Entitic volume] by Auto

mated count 91.0 fL             

80.0 - 96.0                                     [facility]  

 

                          Erythrocyte mean corpuscular hemoglobin [Entitic mass]

 by Automated count 28.7 

pg           26.0 - 34.0                            [facility]  

 

                                        Erythrocyte mean corpuscular hemoglobin 

concentration [Mass/volume] by Automated

 count     31.5 g/dL  32.0 - 36.0 Below low normal            [facility]

[facility]  

 

             Erythrocyte distribution width [Ratio] by Automated count 13.4 %   

    11.6 - 14.8                

                          [facility]      

 

           Platelets [#/volume] in Blood by Automated count 301 K/uL   150 - 450

                        [facility]                            

 

                    Platelet mean volume [Entitic volume] in Blood by Automated 

count 9.0 fL              7.1 - 

10.4                                            [facility]  

 

           Neutrophils [#/volume] in Blood by Automated count 4.28 K/uL  1.70 - 

7.70                       

[facility]                    

 

           Lymphocytes [#/volume] in Blood by Automated count 2.43 K/uL  1.50 - 

6.00                       

[facility]                    

 

           Monocytes [#/volume] in Blood by Automated count 0.60 K/uL  0.00 - 1.

00                       

[facility]                    

 

           Eosinophils [#/volume] in Blood by Automated count 0.12 K/uL  0.03 - 

0.48                       

[facility]                    

 

           Basophils [#/volume] in Blood by Automated count 0.03 K/uL  0.01 - 0.

08                       

[facility]                    

 

                                        0.02 

 

           Urinalysis macro (dipstick) panel - Urine 0.000 10^3/uL 0.000 - 0.012

                       

[facility]                    

 

           Neutrophils/100 leukocytes in Blood by Automated count 57.2 %     42.

0 - 75.0                       

[facility]                    

 

           Lymphocytes/100 leukocytes in Blood by Automated count 32.5 %     15.

0 - 41.0                       

[facility]                    

 

           Monocytes/100 leukocytes in Blood by Automated count 8.0 %      0.0 -

 12.0                       

[facility]                    

 

           Eosinophils/100 leukocytes in Blood by Automated count 1.6 %      0.0

 - 7.0                        

[facility]                    

 

                                        0.40.30 

 

          NRBC      0.0 %                                   [facility]

l  

 

          MANUAL DIFF NOT INDICATED                               [facility]

ospital  

 

          RBC MORPH NOT INDICATED                               [facility]

pital  









                    ID                  Date                Data Source

 

                    [id number]     2021 01:15:00 PM EDT [facility]









          Name      Value     Range     Interpretation Code Description Data Josefina

rce(s) Supporting 

Document(s)

 

                Theophylline [Mass/volume] in Serum or Plasma <2.0 ug/mL      10

.0 - 20.0     Below low 

normal                                  [facility]  

 

                                                                   \BLDo\THEOPHY

LLINE TOXIC LEVEL\BLDx\                 

   THEOPHYLLINE LEVELS ABOVE 20.0 mcg/mL MAY BE TOXIC. 









                    ID                  Date                Data Source

 

                    [id number]         2021 02:12:00 PM EDT [facility] ([facility])









          Name      Value     Range     Interpretation Code Description Data Josefina

rce(s) Supporting 

Document(s)

 

          PDFReport Laboratory test result                               [facility] 

([facility], )  

 

          FVC-Pred  5.26 L                                  [facility] ([facility])  

 

          FVC-Pre   1.90 L                                  [facility] ([facility])  

 

          FVC-%Pred-Pre 36 L                                    [facility] ([facility])  

 

          FVC-LLN   4.29 L                                  [facility] ([facility])  

 

          Fev1-Pre  1.41 L                                  [facility] ([facility])  

 

          Fev1-Pred 4.09 L                                  [facility] ([facility])  

 

          Fev1-%Pred-Pre 34 L                                    [facility] ([facility] )  

 

          Fev1-LLN  3.27 L                                  [facility] ([facility])  

 

          Fev6-Pred 5.08 L                                  [facility] ([facility])  

 

          Fev6-%Pred-Pre 36 L                                    [facility] ([facility])  

 

          Fev6-Pre  1.87 L                                  [facility] ([facility])  

 

          Fev1fvc-Pred 78 %                                    [facility] ([facility])  

 

          Fev6-LLN  4.14 L                                  [facility] ([facility])  

 

          Fev1fvc-Pre 74 %                                    [facility] ([facility])  

 

          Zow1wly-%Pred-Pre 95 %                                    [facility] ([facility])  

 

          Fzy5rbg-BGC 68 %                                    [facility] ([facility])  

 

          Fev6fvc-Pred 97 %                                    [facility] ([facility])  

 

          FEFMax-Pred 10.10 L/E/sec                               [facility] ([facility])  

 

          Fev6fvc-Pre 98 %                                    [facility] ([facility])  

 

          Uod2ifq-%Pred-Pre 101 %                                   [facility] ([facility])  

 

          FEFMax-Pre 5.25 L/E/sec                               [facility] ([facility])  

 

          FEFMax-%Pred-Pre 51 L/E/sec                               [facility] ([facility])  

 

          FEFMax-LLN 7.71 L/E/sec                               [facility] ([facility])  

 

          Fef2575-Pre 1.01 L/E/sec                               [facility] ([facility])  

 

          Fef2575-Pred 3.62 L/E/sec                               [facility] ([facility])  

 

          Ymk7176-%Pred-Pre 27 L/E/sec                               [facility] ([facility])  

 

          Tpw6150-VFM 1.97 L/E/sec                               [facility] ([facility])  

 

          Fev1fev6-Pre 76 %                                    [facility] ([facility])  

 

          ExpTime-Pre 7.28 sec                                [facility] ([facility])  

 

          Fev1fev6-Pred 81 %                                    [facility] ([facility])  

 

          Lhl7ylc0-%Pred-Pre 93 %                                    [facility]  

 

          Ldz3dgo2-XTK 72 %                                    [facility] ([facility])  









                    ID                  Date                Data Source

 

                    [id number]         2021 11:41:00 AM EDT [facility] ([facility])









          Name      Value     Range     Interpretation Code Description Data Josefina

rce(s) Supporting 

Document(s)

 

           White Blood Count 4.8 10     4.0-10.0   Normal (applies to non-numeri

c results)            

[facility] ([facility])  

 

           Hemoglobin 13.2 g/dL  13.5-17.5  Below low normal            [facility] (

[facility])                            

 

           Red Blood Count 4.61 10    4.30-6.10  Normal (applies to non-numeric 

results)            

[facility] ([facility])  

 

           Hematocrit 42.2 %     42.0-52.0  Normal (applies to non-numeric resul

ts)            [facility] 

([facility])         

 

                Mean Corpuscular Volume 91.5 fl         80.0-96.0       Normal (

applies to non-numeric 

results)                                [facility] ([facility]) 

 

 

                Mean Corpuscular Hemoglobin 28.6 pg         27.0-33.0       Norm

al (applies to non-numeric 

results)                                [facility] ([facility]) 

 

 

           Mean Corpuscular HGB Conc 31.3 g/dL  32.0-36.5  Below low normal     

       [facility] 

([facility])         

 

                Red Cell Distribution Width 14.2 %          11.5-14.5       Norm

al (applies to non-numeric 

results)                                [facility] ([facility]) 

 

 

                Platelet Count, Automated 255 10          150-450         Normal

 (applies to non-numeric results)

                                        [facility] ([facility]) 

 

 

           Lymph %    30.0 %     24.0-44.0  Normal (applies to non-numeric resul

ts)            [facility])         

 

           Neutrophils % 57.4 %     36.0-66.0  Normal (applies to non-numeric re

sults)            [facility])         

 

          Eos %     3.1 %     0.0-3.0   Above high normal           [facility] ([facility]) 

 

 

           Baso %     0.6 %      0.0-1.0    Normal (applies to non-numeric resul

ts)            [facility] ([facility])                   

 

           Mono %     8.7 %      2.0-8.0    Above high normal            [facility] 

([facility])

                                         

 

           Immature Granulocyte % 0.2 %      0-3.0      Normal (applies to non-n

umeric results)            

[facility] ([facility])  

 

           Nucleated Red Blood Cell % 0.0 %      0-0        Normal (applies to n

on-numeric results)            

[facility] ([facility])  

 

           Neutrophils # 2.8 10     1.5-8.5    Normal (applies to non-numeric re

sults)            [facility] 

([facility])         

 

           Lymph #    1.5 10     1.5-5.0    Normal (applies to non-numeric resul

ts)            [facility] 

([facility])         

 

           Mono #     0.4 10     0.0-0.8    Normal (applies to non-numeric resul

ts)            [facility] 

([facility])         

 

           Baso #     0.0 10     0.0-0.2    Normal (applies to non-numeric resul

ts)            [facility] 

([facility])         

 

           Eos #      0.2 10     0.0-0.5    Normal (applies to non-numeric resul

ts)            [facility] ([facility])                   









                    ID                  Date                Data Source

 

                    [id number]         2021 11:41:00 AM EDT [facility]









          Name      Value     Range     Interpretation Code Description Data Josefina

rce(s) Supporting 

Document(s)

 

                Class Description Laboratory test result                 Normal 

(applies to non-numeric 

results)                                [facility] ([facility]) 

 

 

                                        <content>.</content><br/><content>Levels

 of Specific IgE       Class  

Description of Class</content><br/><content>---------------------------  -----  
--------------------</content><br/><content>< 0.10         0         
Negative</content><br/><content>0.10 -    0.31         0/I       
Equivocal/Low</content><br/><content>0.32 -    0.55         I         
Low</content><br/><content>0.56 -    1.40         II        
Moderate</content><br/><content>1.41 -    3.90         III       
High</content><br/><content>3.91 -   19.00         IV        Very 
High</content><br/><content>19.01 -  100.00         V         Very 
High</content><br/><content>>100.00         VI        Very 
High</content><br/><content></content> 

 

                J727-HsG D pteronyssinus Laboratory test result                 

Normal (applies to non-numeric

 results)                               [facility] ([facility], ) 

 

 

                L464-GgT D farinae Mite Laboratory test result                 N

ormal (applies to non-numeric 

results)                                [facility] ([facility] ) 

 

 

             U307-IcK Cat Epith/Dander 0.56 kU/L                 Abnormal (appli

es to non-numeric results) 

                          [facility] ([facility] )  

 

           O113-NsM Dog Dander 1.53 kU/L             Abnormal (applies to non-nu

meric results)            

[facility] ([facility] )  

 

                Q418-ZmL [place] Laboratory test result              

   Normal (applies to non-

numeric results)                        [facility] ([facility] ) 

 

 

                V060-GcB [place] Laboratory test result                 No

rmal (applies to non-numeric 

results)                                [facility] ([facility] ) 

 

 

                K879-XhN Cockroach, [employer] Laboratory test result             

    Normal (applies to non-

numeric results)                        [facility] ([facility] ) 

 

 

                K450-JzN [place] Laboratory test result                 Norm

al (applies to non-numeric 

results)                                [facility] ([facility] ) 

 

 

                B625-JjD Penicillium chrysogen Laboratory test result           

      Normal (applies to non-

numeric results)                        [facility] ([facility]) 

 

 

                M003 IgE Aspergillus fumigatu Laboratory test result            

     Normal (applies to non-

numeric results)                        [facility] ([facility] ) 

 

 

                M002 IgE Cladosporium herbaru Laboratory test result            

     Normal (applies to non-

numeric results)                        [facility] ([facility], ) 

 

 

                Z730-FiK Alternaria alternata Laboratory test result            

     Normal (applies to non-

numeric results)                        [facility] ([facility]) 

 

 

                A952-ThV Mucor racemosus Laboratory test result                 

Normal (applies to non-numeric

 results)                               [facility] ([facility]) 

 

 

                W870-SbD Stemphylium Herbarum Laboratory test result            

     Normal (applies to non-

numeric results)                        [facility] ([facility], ) 

 

 

                I833-HnZ Oak, White Laboratory test result                 [place]

l (applies to non-numeric 

results)                                [facility] ([facility], ) 

 

 

                B544-EcR Common Silver Birch Laboratory test result             

    Normal (applies to non-

numeric results)                        [facility] ([facility], ) 

 

 

                V883-AjX Elm, American Laboratory test result                 No

rmal (applies to non-numeric 

results)                                [facility] ([facility], ) 

 

 

                L723-ApH Deonte, White Laboratory test result                 [place]

l (applies to non-numeric 

results)                                [facility] ([facility], ) 

 

 

                E208-BmA Maple/[place]marillo Laboratory test result                 

Normal (applies to non-numeric

 results)                               [facility] ([facility], ) 

 

 

                C407-PwM Hazelnut Tree Laboratory test result                 No

rmal (applies to non-numeric 

results)                                [facility] ([facility], ) 

 

 

                F200-XsU Irwin, White Laboratory test result                 N

ormal (applies to non-numeric 

results)                                [facility] ([facility] ) 

 

 

                S278-InI [place], Mountain Laboratory test result                 

Normal (applies to non-numeric

 results)                               [facility] ([facility] ) 

 

 

                I016-VyU [place] Laboratory test result                 N

ormal (applies to non-numeric 

results)                                [facility] ([facility] ) 

 

 

                B268-XjI Ragweed, Short Laboratory test result                 N

ormal (applies to non-numeric 

results)                                [facility] ([facility] ) 

 

 

                J469-IeF Mugwort Laboratory test result                 Normal (

applies to non-numeric 

results)                                [facility] ([facility] ) 

 

 

                I911-ToW Plantain, English Laboratory test result               

  Normal (applies to non-

numeric results)                        [facility] ([facility] ) 

 

 

                B333-LrB Pigweed, Rough Laboratory test result                 N

ormal (applies to non-numeric 

results)                                [facility] ([facility]) 

 

 

                A976-XgO Sheep [place] Laboratory test result                 Nor

mal (applies to non-numeric 

results)                                [facility] ([facility]) 

 

 

                S851-PbK Nettle Laboratory test result                 Normal (a

pplies to non-numeric results)

                                        [facility] ([facility]) 

 

 

                                        Performed at:  [address]  [id number]

[id number]

: Che Hurst MD, Phone:  [phone]

 









                    ID                  Date                Data Source

 

                    [id number]         2021 11:41:00 [facility]









          Name      Value     Range     Interpretation Code Description Data Josefina

rce(s) Supporting 

Document(s)

 

             IgE [Mass/volume] in Serum 57.4 IU/ml                Normal (applie

s to non-numeric results) 

                          [facility] ([facility])  









                    ID                  Date                Data Source

 

                    [id number]         2021 11:02:00 AM EDT [facility] ([facility])









          Name      Value     Range     Interpretation Code Description Data Josefina

rce(s) Supporting 

Document(s)

 

          PDFReport Laboratory test result                               [facility] 

([facility])  

 

          FVC-Pred  5.26 L                                  [facility] ([facility])  

 

          FVC-Pre   1.75 L                                  [facility] ([facility])  

 

          FVC-%Pred-Pre 33 L                                    [facility] ([facility])  

 

          Fev1-Pred 4.09 L                                  [facility] ([facility])  

 

          FVC-LLN   4.29 L                                  [facility] ([facility])  

 

          Fev1-Pre  1.36 L                                  [facility] ([facility])  

 

          Fev1-%Pred-Pre 33 L                                    [facility] ([facility])  

 

          Fev1-LLN  3.27 L                                  [facility] ([facility])  

 

          Fev6-Pred 5.08 L                                  [facility] ([facility])  

 

          Fev6-Pre  1.75 L                                  [facility] ([facility])  

 

          Fev6-LLN  4.14 L                                  [facility] ([facility] )  

 

          Fev6-%Pred-Pre 34 L                                    [facility] ([facility])  

 

          Fev1fvc-Pre 78 %                                    [facility] ([facility])  

 

          Fev1fvc-Pred 78 %                                    [facility] ([facility])  

 

          Alw1zbw-%Pred-Pre 99 %                                    [facility] ([facility])  

 

          Roj9cwn-ODA 68 %                                    [facility] ([facility])  

 

          Fev6fvc-Pre 100 %                                   [facility] ([facility])  

 

          Fev6fvc-Pred 97 %                                    [facility] ([facility])  

 

          Zbs3rap-%Pred-Pre 103 %                                   [facility] ([facility])  

 

          FEFMax-Pred 10.10 L/E/sec                               [facility] ([facility])  

 

          FEFMax-%Pred-Pre 45 L/E/sec                               [facility] ([facility])  

 

          FEFMax-Pre 4.56 L/E/sec                               [facility] ([facility])  

 

          Fef2575-Pred 3.62 L/E/sec                               [facility] ([facility])  

 

          FEFMax-LLN 7.71 L/E/sec                               [facility] ([facility])  

 

          Fef2575-Pre 1.15 L/E/sec                               [facility] ([facility])  

 

          Wvh3595-%Pred-Pre 31 L/E/sec                               [facility] ([facility])  

 

          ExpTime-Pre 5.91 sec                                [facility] ([facility])  

 

          Qmn3414-CXN 1.97 L/E/sec                               [facility] ([facility])  

 

          Fev1fev6-Pred 81 %                                    [facility] ([facility])  

 

          Fev1fev6-Pre 78 %                                    [facility] ([facility])  

 

          Iwb2hcl5-LHO 72 %                                    [facility] ([facility])  

 

          Fjn0phq5-%Pred-Pre 96 %                                    [facility] ([facility])  









                    ID                  Date                Data Source

 

                    [id number]     2021 01:15:00 PM EDT [facility]









          Name      Value     Range     Interpretation Code Description Data Josefina

rce(s) Supporting 

Document(s)

 

          COMPREHENSIVE CHEM PROFILE                                         [facility]  

 

                                            COMPREHENSIVE METABOLIC PANEL 

 

           Sodium [Moles/volume] in Serum or Plasma 144 mEq/L  136 - 145        

                [facility]                                 

 

           Potassium [Moles/volume] in Serum or Plasma 4.3 mEq/L  3.5 - 5.1     

                   [facility]                                

 

           Chloride [Moles/volume] in Serum or Plasma 106 mEq/L  98 - 107       

                  [facility]                                 

 

                Carbon dioxide, total [Moles/volume] in Serum or Plasma 27.9 mEq

/L      21.0 - 32.0      

                                        [facility]  

 

           Glucose [Mass/volume] in Serum or Plasma 123 mg/dL  70 - 100   Above 

high normal            

[facility]                    

 

           Urea nitrogen [Mass/volume] in Serum or Plasma 17 mg/dL   7 - 18     

                      [facility]                                 

 

          CREATININE SERUM 1.03 mg/dL 0.70 - 1.30                     [facility]  

 

          AGE       50 yrs                                  [facility]  

 

          HEIGHT    72.00 INCHES                               [facility]

ital  

 

          eGFR NON-AFR AMR >60                                     [facility]  

 

          eGFR AFR AMR >60                                     [facility]

ital  

 

          BUN/CREAT 17        6 - 25                        [facility]  

 

           Protein [Mass/volume] in Serum or Plasma 6.1 g/dL   6.0 - 8.3        

                [facility]                                 

 

           Albumin [Mass/volume] in Serum or Plasma 3.5 g/dL   3.8 - 5.4  Below 

low normal            

[facility]                    

 

          GLOBULIN  2.6 g/dL  2.0 - 4.0                     [facility]

l  

 

          A/G RATIO 1.3       0.8 - 2.0                     [facility]  

 

             Calcium [Mass/volume] in Serum or Plasma 8.3 mg/dL    8.8 - 10.2   

Below low normal  

                          [facility]      

 

           Bilirubin.total [Mass/volume] in Serum or Plasma 0.7 mg/dL  0.2 - 1.0

                        [facility]                           

 

           Bilirubin.direct [Mass/volume] in Serum or Plasma 0.2 mg/dL  0.0 - 0.

2                        

[facility]                    

 

          INDIRECT BILI 0.5 mg/dL 0.0 - 1.1                     [facility]

pital  

 

          ALK PHOSPHATASE 57 U/L    40 - 129                      [facility]

ospital  

 

                                        Aspartate aminotransferase [Enzymatic ac

tivity/volume] in Serum or Plasma by 

With P-5'-P 22 IU/L    7 - 37                           [facility]  

 

                                        Alanine aminotransferase [Enzymatic acti

vity/volume] in Serum or Plasma by With 

P-5'-P     21 IU/L    12 - 78                          [facility]  

 

          ANION GAP 10        7 - 15                        [facility]

l  

 

                                                          Estimated GFR referenc

e range: >60ml/min/1.73m               

>18 years: Calculated using IDMS traceable Study Equation           <18 years: 
Calculated using IDMS tracable Bedside Schartz Equation 









                    ID                  Date                Data Source

 

                    [id number]     2021 01:15:00 PM EDT [facility]









          Name      Value     Range     Interpretation Code Description Data Josefina

rce(s) Supporting 

Document(s)

 

          CBC                                               [facility]

l  

 

                                            COMPLETE BLOOD COUNT 

 

           Leukocytes [#/volume] in Blood by Automated count 6.9 K/uL   4.0 - 10

.0                       

[facility]                    

 

           Erythrocytes [#/volume] in Blood by Automated count 4.44 M/uL  4.30 -

 6.10                       

[facility]                    

 

           Hemoglobin [Mass/volume] in Blood 12.9 g/dL  13.5 - 17.5 Below low no

rmal            

[facility]                    

 

           Hematocrit [Volume Fraction] of Blood by Automated count 41.1 %     3

9.0 - 50.0                       

[facility]                    

 

                    Erythrocyte mean corpuscular volume [Entitic volume] by Auto

mated count 92.6 fL             

80.0 - 96.0                                     [facility]  

 

                          Erythrocyte mean corpuscular hemoglobin [Entitic mass]

 by Automated count 29.1 

pg           26.0 - 34.0                            [facility]  

 

                                        Erythrocyte mean corpuscular hemoglobin 

concentration [Mass/volume] by Automated

 count     31.4 g/dL  32.0 - 36.0 Below low normal            [facility]  

 

             Erythrocyte distribution width [Ratio] by Automated count 14.2 %   

    11.6 - 14.8                

                          [facility]      

 

           Platelets [#/volume] in Blood by Automated count 224 K/uL   150 - 450

                        [facility]                            

 

                    Platelet mean volume [Entitic volume] in Blood by Automated 

count 8.5 fL              7.1 - 

10.4                                            [facility]  

 

           Neutrophils [#/volume] in Blood by Automated count 5.80 K/uL  1.70 - 

7.70                       

[facility]                    

 

                Lymphocytes [#/volume] in Blood by Automated count 0.83 K/uL    

   1.50 - 6.00     Below 

low normal                              [facility]  

 

           Monocytes [#/volume] in Blood by Automated count 0.21 K/uL  0.00 - 1.

00                       

[facility]                    

 

           Eosinophils [#/volume] in Blood by Automated count 0.01 K/uL  0.00 - 

0.30                       

[facility]                    

 

           Basophils [#/volume] in Blood by Automated count 0.02 K/uL  0.00 - 0.

10                       

[facility]                    

 

                                        0.03 

 

           Urinalysis macro (dipstick) panel - Urine 0.000 10^3/uL 0.000 - 0.012

                       

[facility]                    

 

                Neutrophils/100 leukocytes in Blood by Automated count 84.2 %   

       42.2 - 75.2     Above 

high normal                             [facility]  

 

                Lymphocytes/100 leukocytes in Blood by Automated count 12.0 %   

       15.0 - 41.0     Below 

low normal                              [facility]  

 

           Monocytes/100 leukocytes in Blood by Automated count 3.0 %      0.0 -

 12.0                       

[facility]                    

 

           Eosinophils/100 leukocytes in Blood by Automated count 0.1 %      0.0

 - 7.0                        

[facility]                    

 

                                        0.30.40 

 

          NRBC      0.0 %                                   [facility]

l  

 

          MANUAL DIFF NOT INDICATED                               [facility]

ospital  

 

          RBC MORPH NOT INDICATED                               [facility]

pital  









                    ID                  Date                Data Source

 

                    [id number]     2021 11:42:00 AM EDT [facility]









          Name      Value     Range     Interpretation Code Description Data Josefina

rce(s) Supporting 

Document(s)

 

          T4 FREE   0.95 ng/dL 0.76 - 1.46                     [facility]

ital  









                    ID                  Date                Data Source

 

                    [id number]     2021 11:42:00 AM EDT [facility]









          Name      Value     Range     Interpretation Code Description Data Josefina

rce(s) Supporting 

Document(s)

 

          TSH       1.73 uIU/mL 0.36 - 3.74                     [facility]

pital  









                    ID                  Date                Data Source

 

                    [id number]     2021 11:42:00 AM EDT [facility]









          Name      Value     Range     Interpretation Code Description Data Josefina

rce(s) Supporting 

Document(s)

 

          LIPID PROFILE                                         [facility]  

 

                                        LIPID PROFILE 

 

           Cholesterol [Mass/volume] in Serum or Plasma 197 mg/dL               

                    [facility]                                 

 

           Triglyceride [Mass/volume] in Serum or Plasma 85 mg/dL               

                     [facility]                                 

 

           Cholesterol in HDL [Mass/volume] in Serum or Plasma 66 mg/dL         

                           [facility]                                 

 

                Cholesterol in LDL [Mass/volume] in Serum or Plasma by calculati

on 114 mg/dL                        

                                        [facility]  

 

          CHOL/HDL  2.98                                    [facility]

l  

 

                                                                  \BLDo\INTERPRE

TATION\BLDx\                REFERENCE 

RANGES (NATIONAL CHOLESTEROL EDUCATION PROGRAM)                  CHOLESTEROL    
 < 200 mg/dL        DESIREABLE                                  200 - 239 mg/dL 
   BORDERLINE HIGH                                  > 240 mg/dL        HIGH     
             TRIGLYCERIDES   < 150 mg/dL        DESIREABLE                      
            150 - 199 mg/dL    BORDERLINE HIGH                                  
200 - 499 mg/dL    HIGH                                  > or = 500 mg/dL   VERY
 HIGH                  HDL             > or = 60 mg/dL    HIGH                  
                < 40 mg/dL         LOW                  LDL             < 100 
mg/dL        DESIREABLE                                  100 - 129 mg/dL    LOW 
RISK                                  130 - 159 mg/dL    BORDERLINE HIGH        
                          160 - 189 mg/dL    HIGH                               
   > or = 190 mg/dL   VERY HIGH 









                    ID                  Date                Data Source

 

                    [id number]     2021 11:42:00 AM EDT [facility]









          Name      Value     Range     Interpretation Code Description Data Josefina

rce(s) Supporting 

Document(s)

 

          LIVER PROFILE                                         [facility]  

 

                                            HEPATIC PANEL 

 

           Protein [Mass/volume] in Serum or Plasma 6.6 g/dL   6.0 - 8.3        

                [facility]                                 

 

           Albumin [Mass/volume] in Serum or Plasma 3.4 g/dL   3.8 - 5.4  Below 

low normal            

[facility]                    

 

          GLOBULIN  3.2 g/dL  2.0 - 4.0                     [facility]  

 

          A/G RATIO 1.1       0.8 - 2.0                     [facility]  

 

           Bilirubin.total [Mass/volume] in Serum or Plasma 0.6 mg/dL  0.2 - 1.0

                        [facility]                           

 

           Bilirubin.direct [Mass/volume] in Serum or Plasma 0.2 mg/dL  0.0 - 0.

2                        

[facility]                    

 

          INDIRECT BILI 0.4 mg/dL 0.0 - 1.1                     [facility]  

 

          ALK PHOSPHATASE 57 U/L    40 - 129                      [facility]

ospital  

 

                                        Aspartate aminotransferase [Enzymatic ac

tivity/volume] in Serum or Plasma by 

With P-5'-P 24 IU/L    7 - 37                           [facility]  

 

                                        Alanine aminotransferase [Enzymatic acti

vity/volume] in Serum or Plasma by With 

P-5'-P     20 IU/L    12 - 78                          [facility]  









                    ID                  Date                Data Source

 

                    [id number]     2021 11:42:00 AM EDT [facility]









          Name      Value     Range     Interpretation Code Description Data Josefina

rce(s) Supporting 

Document(s)

 

          BASIC METABOLIC PANEL                                         [facility]  

 

                                            BASIC METABOLIC PANEL 

 

           Sodium [Moles/volume] in Serum or Plasma 142 mEq/L  136 - 145        

                [facility]                                 

 

           Potassium [Moles/volume] in Serum or Plasma 3.9 mEq/L  3.5 - 5.1     

                   [facility]                                

 

           Chloride [Moles/volume] in Serum or Plasma 106 mEq/L  98 - 107       

                  [facility]                                 

 

                Carbon dioxide, total [Moles/volume] in Serum or Plasma 27.5 mEq

/L      21.0 - 32.0      

                                        [facility]  

 

           Glucose [Mass/volume] in Serum or Plasma 75 mg/dL   70 - 100         

                [facility]                                 

 

                Urea nitrogen [Mass/volume] in Serum or Plasma 19 mg/dL        7

 - 18          Above high normal

                                        [facility]  

 

          CREATININE SERUM 1.09 mg/dL 0.70 - 1.30                     [facility]  

 

          AGE       50 yrs                                  [facility]

l  

 

          eGFR NON-AFR AMR >60                                     [facility]  

 

          eGFR AFR AMR >60                                     [facility]  

 

          BUN/CREAT 17        6 - 25                        [facility]  

 

             Calcium [Mass/volume] in Serum or Plasma 8.0 mg/dL    8.8 - 10.2   

Below low normal  

                          [facility]      

 

          ANION GAP 9         7 - 15                        [facility]

l  

 

                                                       Estimated GFR reference r

j luis:  > 60 mL/min/1.73m           >18 

years: Calculated using IDMS traceable MDRD Study Equation        <18 years: 
Calculated using IDMS traceable Bedside [place] Equation 









                    ID                  Date                Data Source

 

                    [id number]     2021 11:42:00 AM EDT [facility]









          Name      Value     Range     Interpretation Code Description Data Josefina

rce(s) Supporting 

Document(s)

 

          25-OH VITAMIN D 13.9 ng/mL 30.0 - 100 Below low normal           [facility]  

 

                                                              Deficient         

 < 20 ng/mL                      

Insufficient       20 - < 30 ng/mL                      Sufficient         30 - 
100 ng/mL      25-OH vitamin D reference values based on the Clinical Guidelines
      Subcommittee of the Endocrine Society Task Force.      Biotin can 
interfere with 25-OH Vitamin D results if taken 48 hours prior      to specimen 
collection. 









                    ID                  Date                Data Source

 

                    [id number]     2021 11:42:00 AM EDT [facility]









          Name      Value     Range     Interpretation Code Description Data Josefina

rce(s) Supporting 

Document(s)

 

           Hemoglobin A1c/Hemoglobin.total in Blood 5.5 %      4.0 - 5.6        

                [facility]                                 

 

                          Glucose mean value [Mass/volume] in Blood Estimated fr

om glycated hemoglobin 111

 mg/dL                                              [facility]  

 

                                                                   \BLDo\HEMOGLO

BIN A1C\BLDx\                        4.0

 - 5.6%: Normal                        5.7 - 6.4%: Suggests Impaired Glucose 
Metabolism                        > or = 6.5%: Abnormal             Estimated 
average glucose calculated using ADAG Study formula                as 
recommended by the American Diabetes Association. 









                    ID                  Date                Data Source

 

                    [id number]     2021 11:42:00 AM EDT [facility]









          Name      Value     Range     Interpretation Code Description Data Josefina

rce(s) Supporting 

Document(s)

 

          CBC                                               [facility]

l  

 

                                            COMPLETE BLOOD COUNT 

 

           Leukocytes [#/volume] in Blood by Automated count 6.6 K/uL   4.0 - 10

.0                       

[facility]                    

 

           Erythrocytes [#/volume] in Blood by Automated count 4.74 M/uL  4.30 -

 6.10                       

[facility]                    

 

           Hemoglobin [Mass/volume] in Blood 13.6 g/dL  13.5 - 17.5             

          [facility]                                 

 

           Hematocrit [Volume Fraction] of Blood by Automated count 43.6 %     3

9.0 - 50.0                       

[facility]                    

 

                    Erythrocyte mean corpuscular volume [Entitic volume] by Auto

mated count 92.0 fL             

80.0 - 96.0                                     [facility]  

 

                          Erythrocyte mean corpuscular hemoglobin [Entitic mass]

 by Automated count 28.7 

pg           26.0 - 34.0                            [facility]  

 

                                        Erythrocyte mean corpuscular hemoglobin 

concentration [Mass/volume] by Automated

 count     31.2 g/dL  32.0 - 36.0 Below low normal            [facility]

[facility]  

 

             Erythrocyte distribution width [Ratio] by Automated count 14.0 %   

    11.6 - 14.8                

                          [facility]      

 

           Platelets [#/volume] in Blood by Automated count 278 K/uL   150 - 450

                        [facility]                            

 

                    Platelet mean volume [Entitic volume] in Blood by Automated 

count 8.6 fL              7.1 - 

10.4                                            [facility]  

 

           Neutrophils [#/volume] in Blood by Automated count 4.90 K/uL  1.70 - 

7.70                       

[facility]                    

 

                Lymphocytes [#/volume] in Blood by Automated count 1.27 K/uL    

   1.50 - 6.00     Below 

low normal                              [facility]  

 

           Monocytes [#/volume] in Blood by Automated count 0.32 K/uL  0.00 - 1.

00                       

[facility]                    

 

           Eosinophils [#/volume] in Blood by Automated count 0.05 K/uL  0.00 - 

0.30                       

[facility]                    

 

           Basophils [#/volume] in Blood by Automated count 0.04 K/uL  0.00 - 0.

10                       

[facility]                    

 

                                        0.02 

 

           Urinalysis macro (dipstick) panel - Urine 0.000 10^3/uL 0.000 - 0.012

                       

[facility]                    

 

           Neutrophils/100 leukocytes in Blood by Automated count 74.3 %     42.

2 - 75.2                       

[facility]                    

 

           Lymphocytes/100 leukocytes in Blood by Automated count 19.2 %     15.

0 - 41.0                       

[facility]                    

 

           Monocytes/100 leukocytes in Blood by Automated count 4.8 %      0.0 -

 12.0                       

[facility]                    

 

           Eosinophils/100 leukocytes in Blood by Automated count 0.8 %      0.0

 - 7.0                        

[facility]                    

 

                                        0.60.30 

 

          NRBC      0.0 %                                   [facility]

l  

 

          MANUAL DIFF NOT INDICATED                               [facility]

ospital  

 

          RBC MORPH NOT INDICATED                               [facility]

pital  









                    ID                  Date                Data Source

 

                    [id number]     2021 12:14:55 [place] [address]                TELEPHONE [phone] 
________________________________________________________________________        
      RADIOLOGY DEPARTMENT 
________________________________________________________________________________

______   Name:               [facility] 
#:     [id number] :                1970                                
Ordering Physician: KELTON Sex:                M                            
             Date:                  21 Admission Type:     E/R             
                          X-ray Number:       [id number]  
________________________________________________________________________________

________________  **Unsigned Transcriptions are preliminary reports and do not 
represent a                     Medical or Legal Document** 
________________________________________________________________________________
________________     XRAY CHEST 2 VIEW PA - LATERA [id number] COMPLETE:21 18:01
 BBS [id number] (REASON FOR CHEST: SHORTNESS OF BREATH  Examination of the chest 2 
views submitted.  Priors:2020.  FINDINGS:  Stable chest. No infiltrate or 
effusion. Cardiac silhouette within normal limits.  IMPRESSION:  No acute 
cardiac pulmonary disease.                                 Electronically 
Reviewed and Signed By                               NICK LAGUNA                               21 12:14    Dictating 
Initials: BE Transcribed Date: 21 08:35 Transcribe Initials: BBS   









          Name      Value     Range     Interpretation Code Description Data Josefina

rce(s) Supporting 

Document(s)

 

                                                                       









                    ID                  Date                Data Source

 

                    [id number]     05/10/2021 01:55:00 PM EDT [facility]









          Name      Value     Range     Interpretation Code Description Data Josefina

rce(s) Supporting 

Document(s)

 

          CULTURE URINE QUANT                                         [facility]  

 

                                            CULTURE URINE 

 

          SOURCE    Clean Catch                               [facility]

[facility]  

 

                                            COLONY COUNT          _>100,000_CFU/

ML______________________                

    .KJV.                          
______________________________________                          _______
_______________________________    PRELIMINARY REPORT    
_NO_GROWTH_@_24_HOURS_________________                    56.[facility].    
                      _GRAM_POSITIVE_GROWTH_@_48_HOURS______                    
.KJV.                          
______________________________________                          
______________________________________    FINAL REPORT          
_NORMAL_SKIN_FLORA_ISOLATED___________                    .KJV.    
                      _NO_PATHOGENS_ISOLATED_@_48_HOURS_____                    
.KJV.                          
______________________________________                          
______________________________________ 

 

          ID & SENSI NOT INDICATED                               [facility]an  

 

                                                                                

                                        

    [email or web address].KJV.COMPLETEClean CatchNOT INDICATED 









                    ID                  Date                Data Source

 

                    [id number]         2021 02:09:00 PM EDT [facility] ([facility])









          Name      Value     Range     Interpretation Code Description Data Josefina

rce(s) Supporting 

Document(s)

 

                Aspergillus Flavus [facility] test result                 No

rmal (applies to non-numeric 

results)                                [facility] ([facility]) 

 

 

                Aspergillus Fumigatus [facility] test result                

 Normal (applies to non-

numeric results)                        [facility] ([facility]) 

 

 

                Aspergillus Niger [facility] test result                 Nor

mal (applies to non-numeric 

results)                                [facility] ([facility]) 

 

 

                                        Performed at:  [address]  [id number]

61

: Che Hurst MD, Phone:  [phone]

 









                    ID                  Date                Data Source

 

                    [id number]         2021 01:44:00 PM EDT [facility] ([facility])









          Name      Value     Range     Interpretation Code Description Data Josefina

rce(s) Supporting 

Document(s)

 

          PDFReport Laboratory test result                               [facility] 

([facility], )  

 

          FVC-Pred  5.26 L                                  [facility] ([facility])  

 

          FVC-Pre   1.73 L                                  [facility] ([facility])  

 

          FVC-%Pred-Pre 32 L                                    [facility])  

 

          FVC-LLN   4.29 L                                  [facility] ([facility])  

 

          Fev1-Pred 4.09 L                                  [facility] ([facility])  

 

          Fev1-Pre  1.19 L                                  [facility] ([facility])  

 

          Fev1-%Pred-Pre 29 L                                    [facility])  

 

          Fev1-LLN  3.27 L                                  [facility])  

 

          Fev6-Pred 5.08 L                                  [facility])  

 

          Fev6-Pre  1.73 L                                  [facility] ([facility])  

 

          Fev6-LLN  4.14 L                                  [facility] ([facility])  

 

          Fev6-%Pred-Pre 34 L                                    [facility] )  

 

          Fev1fvc-Pred 78 %                                    [facility] ([facility])  

 

          Fev1fvc-Pre 69 %                                    [facility])  

 

          Ixq1svg-%Pred-Pre 88 %                                    [facility])  

 

          Jvg0kwg-EWV 68 %                                    [facility])  

 

          Fev6fvc-Pred 97 %                                    [facility])  

 

          Fev6fvc-Pre 100 %                                   [facility])  

 

          Xjp5cxt-%Pred-Pre 103 %                                   [facility] ([facility])  

 

          FEFMax-Pred 10.10 L/E/sec                               [facility] ([facility])  

 

          FEFMax-%Pred-Pre 30 L/E/sec                               [facility])  

 

          FEFMax-Pre 3.07 L/E/sec                               [facility])  

 

          FEFMax-LLN 7.71 L/E/sec                               [facility])  

 

          Fef2575-Pred 3.62 L/E/sec                               [facility])  

 

          Grx2206-%Pred-Pre 19 L/E/sec                               [facility])  

 

          Fef2575-Pre 0.71 L/E/sec                               [facility])  

 

          Fev1fev6-Pred 81 %                                    [facility] ([facility])  

 

          ExpTime-Pre 6.66 sec                                [facility] ([facility])  

 

          Ezr5808-OTD 1.97 L/E/sec                               [facility] ([facility])  

 

          Fev1fev6-Pre 69 %                                    [facility] ([facility])  

 

          Jib3kuy4-RWD 72 %                                    [facility] ([facility])  

 

          Rwj8ecf3-%Pred-Pre 85 %                                    [facility])  









                    ID                  Date                Data Source

 

                    [id number]     2021 02:50:00 PM EDT [facility]









          Name      Value     Range     Interpretation Code Description Data Josefina

rce(s) Supporting 

Document(s)

 

          RAST ALLERGENS ZONE 1 [facility]  

 

                                             _RAST ALLERGENS ZONE 1- COMPREHENSI

VE_Allergens, Zone 1Reported: 2021

 20:05   
Status=F------------------------------------------------------------------------

--------TEST                      RESULT               FLAG  RANGE       UNITS  
    
SC------------------------------------------------------------------------------

--Class Description                                                           
      .rfl.COMPLETE.LCTR    Levels of Specific IgE       Class  
Description of Class    ---------------------------  -----  --------------------
                   < 0.10         0         Negative           0.10 -    0.31   
      0/I       Equivocal/Low           0.32 -    0.55         I         Low    
       0.56 -    1.40         II        Moderate           1.41 -    3.90       
  III       High           3.91 -   19.00         IV        Very High          
19.01 -  100.00         V         Very High                  >100.00         VI 
       Very PobbP530-MoE D pteronyssinus  <0.10                      Class 0    
  kU/L            .rfl.COMPLETE.KBKFQ306-HsS D farinae        
<0.10                      Class 0      kU/L            
.rfl.COMPLETE.SWFKI768-JaE Cat Dander       0.54                 A 
    Class I      kU/L            .rfl.COMPLETE.VUOFQ865-DjQ Dog 
Dander       1.38                 A     Class II     kU/L            
.rfl.COMPLETE.AHEAW818-AcY Bermuda Grass    <0.10                  
    Class 0      kU/L            .rfl.COMPLETE.EXAWM608-PtX 
Bluegrass,       <0.10                      Class 0      kU/L      BN      
.rfl.COMPLETE.MGFNFukctcwaF583-GsJ Bahia Grass      <0.10          
            Class 0      kU/L      BN      .rfl.COMPLETE.XAQLE104-
IgE Cockroach,       <0.10                      Class 0      kU/L      BN      
.rfl.COMPLETE.JUQUZvthxsryL466-GhV Penicillium      <0.10          
            Class 0      kU/L      BN      
.rfl.COMPLETE.KIDQxmwhrkkeyF582-UqI Cladosporium     <0.10         
             Class 0      kU/L      21.
005.rfl.COMPLETE.LOIEorwnovbyM561-KwB Aspergillus      <0.10                    
  Class 0      kU/L      .rfl.COMPLETE.NBXQcqgghcykbV260-
IgE Mucor racemosus  <0.10                      Class 0      kU/L      BN      
.rfl.COMPLETE.QFVXW024-VpT Alternaria       <0.10                  
    Class 0      kU/L      .rfl.COMPLETE.WMUDoqoskdhqpF463-
IgE Stemphylium      <0.10                      Class 0      kU/L      .rfl.COMPLETE.ISVTmnfyccbvN742-CpB Common Silver    <0.10          
            Class 0      kU/L      .rfl.COMPLETE.ACTIEbydkC059-XcO Oak, White       <0.10             
         Class 0      kU/L      .rfl.COMPLETE.DTVKM835-OmX 
Elm, American    <0.10                      Class 0      kU/L      .rfl.COMPLETE.OBZDB877-HyK Deonte, White       <0.10                  
    Class 0      kU/L      .rfl.COMPLETE.OUWTH669-VeO 
Maple/Box Elder  <0.10                      Class 0      kU/L      BN      
.rfl.COMPLETE.EDHVT675-FeE Hazelnut Tree    <0.10                  
    Class 0      kU/L      BN      .rfl.COMPLETE.NWSKH944-RpR 
[place], White   <0.10                      Class 0      kU/L      BN      
.rfl.COMPLETE.LVNDB781-WwC [place]   <0.10                  
    Class 0      kU/L      BN      .rfl.COMPLETE.OKIAE330-HkK 
[place], Mountain  <0.10                      Class 0      kU/L      BN      
.rfl.COMPLETE.HHNEH831-UoS Ragweed, Short   <0.10                  
    Class 0      kU/L      BN      .rfl.COMPLETE.LBDPR872-JaJ 
Mugwort          <0.10                      Class 0      kU/L      BN      
.rfl.COMPLETE.ZPLHJ390-VmO Plantain,        <0.10                  
    Class 0      kU/L      BN      .rfl.COMPLETE.XVWBKzdcpvqH723-
IgE Pigweed, Common  <0.10                      Class 0      kU/L      BN      
.rfl.COMPLETE.BHAXZ577-XaX Sheep Sorrel     <0.10                  
    Class 0      kU/L      BN      .rfl.COMPLETE.HGYTT165-UjT 
Nettle           <0.10                      Class 0      kU/L      BN      
.rfl.COMPLETE.LCTRTest(s) 830807-F244-BvX Cockroach, American; 
230175-L360-XtV North, Whitewere developed and had performance characteristics
 determined by[facility]. These tests have not been cleared or approved by the 
U.S.Food and Drug Administration. The FDA has determined that suchclearance or 
approval is not necessary. These tests are used forclinical purposes. These 
should not be regarded as investigationalor for research.BN   Test performed by:
 [facility] 
[address]               [id number]               Quoc Tovar MD 









                    ID                  Date                Data Source

 

                    [id number]     2021 02:50:00 PM EDT [facility]









          Name      Value     Range     Interpretation Code Description Data Josefina

rce(s) Supporting 

Document(s)

 

          IMMUNOGLOBULIN E                                         [facility]  

 

                                             _IMMUNOGLOBULIN E_Immunoglobulin E,

 TotalReported: 2021 08:05   

Status=F------------------------------------------------------------------------

                      RESULT               FLAG  RANGE       UNITS      
SC------------------------------------------------------------------------------

--Immunoglobulin E, Total   33                         6-495        IU/mL     BN
      21.0805.rfl.COMPLETE.LCTRBN   Test performed by: [facility] [address] [address]               
[id number]               Quoc Tovar MD 









                    ID                  Date                Data Source

 

                    [id number]     2021 02:50:00 PM EDT [facility]









          Name      Value     Range     Interpretation Code Description Data Josefina

rce(s) Supporting 

Document(s)

 

          CBC                                               [facility]

l  

 

                                            COMPLETE BLOOD COUNT 

 

           Leukocytes [#/volume] in Blood by Automated count 5.8 K/uL   4.0 - 10

.0                       

[facility]                    

 

           Erythrocytes [#/volume] in Blood by Automated count 4.85 M/uL  4.30 -

 6.10                       

[facility]                    

 

           Hemoglobin [Mass/volume] in Blood 13.9 g/dL  13.5 - 17.5             

          [facility]                                 

 

           Hematocrit [Volume Fraction] of Blood by Automated count 44.4 %     3

9.0 - 50.0                       

[facility]                    

 

                    Erythrocyte mean corpuscular volume [Entitic volume] by Auto

mated count 91.5 fL             

80.0 - 96.0                                     [facility]  

 

                          Erythrocyte mean corpuscular hemoglobin [Entitic mass]

 by Automated count 28.7 

pg           26.0 - 34.0                            [facility]  

 

                                        Erythrocyte mean corpuscular hemoglobin 

concentration [Mass/volume] by Automated

 count     31.3 g/dL  32.0 - 36.0 Below low normal            [facility]

[facility]  

 

             Erythrocyte distribution width [Ratio] by Automated count 13.2 %   

    11.6 - 14.8                

                          [facility]      

 

           Platelets [#/volume] in Blood by Automated count 280 K/uL   150 - 450

                        [facility]                            

 

                    Platelet mean volume [Entitic volume] in Blood by Automated 

count 9.0 fL              7.1 - 

10.4                                            [facility]  

 

           Neutrophils [#/volume] in Blood by Automated count 2.78 K/uL  1.70 - 

7.70                       

[facility]                    

 

           Lymphocytes [#/volume] in Blood by Automated count 2.33 K/uL  1.50 - 

6.00                       

[facility]                    

 

           Monocytes [#/volume] in Blood by Automated count 0.46 K/uL  0.00 - 1.

00                       

[facility]                    

 

           Eosinophils [#/volume] in Blood by Automated count 0.17 K/uL  0.00 - 

0.30                       

[facility]                    

 

           Basophils [#/volume] in Blood by Automated count 0.05 K/uL  0.00 - 0.

10                       

[facility]                    

 

                                        0.01 

 

           Urinalysis macro (dipstick) panel - Urine 0.000 10^3/uL 0.000 - 0.012

                       

[facility]                    

 

           Neutrophils/100 leukocytes in Blood by Automated count 47.9 %     42.

2 - 75.2                       

[facility]                    

 

           Lymphocytes/100 leukocytes in Blood by Automated count 40.2 %     15.

0 - 41.0                       

[facility]                    

 

           Monocytes/100 leukocytes in Blood by Automated count 7.9 %      0.0 -

 12.0                       

[facility]                    

 

           Eosinophils/100 leukocytes in Blood by Automated count 2.9 %      0.0

 - 7.0                        

[facility]                    

 

                                        0.90.20 

 

          NRBC      0.0 %                                   [facility]

l  

 

          MANUAL DIFF NOT INDICATED                               [facility]

ospital  

 

          RBC MORPH NOT INDICATED                               [facility]

pital  









                    ID                  Date                Data Source

 

                    [id number]         2021 10:27:00 AM EST [facility] ([facility], )









          Name      Value     Range     Interpretation Code Description Data Josefina

rce(s) Supporting 

Document(s)

 

          PDFReport Laboratory test result                               [facility] 

([facility], )  

 

          FVC-Pre   2.61 L                                  [facility] ([facility], )  

 

          FVC-Pred  5.26 L                                  [facility] ([facility], )  

 

          FVC-%Pred-Pre 49 L                                    [facility] ([facility], )  

 

          Fev1-Pred 4.09 L                                  [facility] ([facility])  

 

          FVC-LLN   4.29 L                                  [facility] ([facility])  

 

          Fev1-Pre  1.89 L                                  [facility] ([facility])  

 

          Fev1-%Pred-Pre 46 L                                    [facility] ([facility], )  

 

          Fev1-LLN  3.27 L                                  [facility] ([facility])  

 

          Fev6-Pred 5.08 L                                  [facility] ([facility])  

 

          Fev6-Pre  2.61 L                                  [facility] ([facility])  

 

          Fev1fvc-Pred 78 %                                    [facility] ([facility])  

 

          Fev6-LLN  4.14 L                                  [facility] ([facility])  

 

          Fev6-%Pred-Pre 51 L                                    [facility] ([facility], )  

 

          Fev1fvc-Pre 72 %                                    [facility] ([facility])  

 

          Ogd7zem-%Pred-Pre 92 %                                    [facility] ([facility])  

 

          Fev6fvc-Pre 100 %                                   [facility] ([facility])  

 

          Cqq5ugn-TLL 68 %                                    [facility] ([facility])  

 

          Fev6fvc-Pred 97 %                                    [facility] ([facility])  

 

          Pzh2uen-%Pred-Pre 103 %                                   [facility] ([facility])  

 

          FEFMax-Pred 10.10 L/E/sec                               [facility] ([facility])  

 

          FEFMax-%Pred-Pre 55 L/E/sec                               [facility] ([facility])  

 

          FEFMax-Pre 5.60 L/E/sec                               [facility] ([facility])  

 

          FEFMax-LLN 7.71 L/E/sec                               [facility] ([facility])  

 

          Rau8759-%Pred-Pre 34 L/E/sec                               [facility] ([facility])  

 

          Fef2575-Pred 3.62 L/E/sec                               [facility] ([facility])  

 

          Fef2575-Pre 1.24 L/E/sec                               [facility] ([facility], )  

 

          ExpTime-Pre 6.35 sec                                [facility] ([facility], )  

 

          Skq9778-PXY 1.97 L/E/sec                               [facility] ([facility], )  

 

          Fev1fev6-Pre 72 %                                    [facility] ([facility], )  

 

          Eag3rir2-%Pred-Pre 89 %                                    [facility] ([facility], )  

 

          Fev1fev6-Pred 81 %                                    [facility] ([facility], )  

 

          Cnk7vrr6-MNZ 72 %                                    [facility] ([facility], )  









                    ID                  Date                Data Source

 

                    [id number]     2020 09:36:33 AM [facility]

              [address] [place] [address]               [place] [phone] 
________________________________________________________________________        
     RADIOLOGY DEPARTMENT 
________________________________________________________________________________

______   Name:                  [facility] #:     [id number] :                   1970                      
                Ordering Physician: MARIBETH SORTO Sex:                   M         
                                      Date:                  20 Admission 
Type:        E/R                                             X-ray Number:      
 [id number]  ______________________________________________________________
__________________________________  **Unsigned Transcriptions are preliminary 
reports and do not represent a                     Medical or Legal Document** 
_____________________________________________
___________________________________________________     XRAY CHEST 2 VIEW PA - 
OLUMONICA [id number]                COMPLETE:20 15:07 LEI 63563 (REASON FOR CHEST:
 DYSPNEA  Comparison: 2020.  Examination of the chest 2 views submitted.  
FINDINGS:  Mediastinal structures are midline. The heart is normal in size and 
shape. Hilar structures are normal in appearance. The lungs are clear. No 
diaphragmatic, pleural or skeletal abnormalities are seen.  IMPRESSION:  Normal 
study.                                        Electronically Reviewed and Signed
 By                                    NICK LAGUNA                                    20 09:36    Dictating 
Initials: BE Transcribed Date: 20 08:31 Transcribe Initials: BBS   









          Name      Value     Range     Interpretation Code Description Data Josefina

rce(s) Supporting 

Document(s)

 

                                                                       









                    ID                  Date                Data Source

 

                    [id number]     2020 01:55:00 PM EST [facility]









          Name      Value     Range     Interpretation Code Description Data Josefina

rce(s) Supporting 

Document(s)

 

          CBC WITHOUT DIFFERENTIAL                                         [facility]  

 

                                            COMPLETE BLOOD COUNT 

 

           Leukocytes [#/volume] in Blood by Automated count 8.6 K/uL   4.0 - 10

.0                       

[facility]                    

 

           Erythrocytes [#/volume] in Blood by Automated count 4.53 M/uL  4.30 -

 6.10                       

[facility]                    

 

           Hemoglobin [Mass/volume] in Blood 13.5 g/dL  13.5 - 17.5             

          [facility]                                 

 

           Hematocrit [Volume Fraction] of Blood by Automated count 42.8 %     3

9.0 - 50.0                       

[facility]                    

 

                    Erythrocyte mean corpuscular volume [Entitic volume] by Auto

mated count 94.5 fL             

80.0 - 96.0                                     [facility]  

 

                          Erythrocyte mean corpuscular hemoglobin [Entitic mass]

 by Automated count 29.8 

pg           26.0 - 34.0                            [facility]  

 

                                        Erythrocyte mean corpuscular hemoglobin 

concentration [Mass/volume] by Automated

 count     31.5 g/dL  32.0 - 36.0 Below low normal            [facility]

demarcus  

 

             Erythrocyte distribution width [Ratio] by Automated count 13.2 %   

    11.6 - 14.8                

                          [facility]      

 

           Platelets [#/volume] in Blood by Automated count 230 K/uL   150 - 450

                        [facility]                            

 

                    Platelet mean volume [Entitic volume] in Blood by Automated 

count 8.7 fL              7.1 - 

10.4                                            [facility]  









                    ID                  Date                Data Source

 

                    [id number]     2020 09:39:16 AM EST [facility]                       [address] 

[place] [address]                  
     TELEPHONE [phone] __________________
______________________________________________________                      
RADIOLOGY DEPARTMENT 
________________________________________________________________________________

______   Name:               [facility] 
#:     [id number] :                1970                                
Ordering Physician: DEMETRIS SCHOFIELD Sex:                M                            
            Date:                  20 Admission Type:     E/R             
                         X-ray Number:       [id number]  
________________________________________________________
________________________________________          **Unsigned Transcriptions are 
preliminary reports and do not represent a                             Medical 
or Legal Document** _______________________
_________________________________________________________________________     
XRAY CHEST 2 VIEW PA - LATERA [id number] COMPLETE:20 15:00 BBS  (REASON 
FOR CHEST: ASTHMA EXACERBATION  FINDINGS:  Examination of the chest 2 views 
submitted for evaluation.  Mediastinal structures              are midline. The 
heart is normal in size and shape. Hilar               structures are normal in 
appearance. The lungs are clear.               No diaphragmatic, pleural or 
skeletal abnormalities              are seen.  IMPRESSION:  Normal study.       
                                 Electronically Reviewed and Signed By          
                            OSCAR TOLBERT MD, MD                                       20 09:39 Dictating 
Initials: GMM Transcribed Date: 20 14:46 Transcribe Initials:    









          Name      Value     Range     Interpretation Code Description Data Josefina

rce(s) Supporting 

Document(s)

 

                                                                       









                    ID                  Date                Data Source

 

                    [id number]          2020 12:18:58 AM EST [facility]









          Name      Value     Range     Interpretation Code Description Data Josefina

rce(s) Supporting 

Document(s)

 

          ER Note                                           [facility]

l 

DRNNYw2aBhLTNlzscN4CLCUlXM9vxip3RPtcJ5RoSATkaaDgLLZxR4bxPRHQDETYZGHbjS0ySNVkTltH

vYm
mLRFoNKRYtCcKmArViD0xiiiq8xDQ1JSVzMuseHZ8NhOh4IJTgJ4SwZLGoBIDcc6MyOz4+FrT1jhUhxW

n0dV2JS6UAAUaO37giKo5pRJMbvHqufYDu4wPUvvbAk4SxqpiDAIUxASUMOOcSEEXZC89HTAKCHvOZAE

zdnq3ou9SwZg2dUxyI81/ZrfmxtVVb+2f7/Hvjg3YF
nKKcm9i6iurEjWzvolCJUMtDdFRG6px02mHLqdWWcMqHT0xXKMpKDj1BPrL06aLVtyQvJJ+Z67SKXilv

6wjWc8+Chente+1Oluq2A8/Haiecvf6/5jzEwMBjfDnSjMzWrSCjfMk70mWUyNZdjHGlk9OTKXMKn4qVz9

6HPAeJ0viK+yNdQup0b6BuAts5/FwqRZQrWUCR8koP
YvNOvYUBdbteXVcG6xjMftuqi1xGoUY2GkI2kCyZQ/qYfOf1ULA+QgTs2cU/95/FMvVPLqy/+vN/gf9x

GcnW/12pTuCBP3wm4q57hCBA5VYOB0D17kJXOdBBP63f6KkusEOIjqUURMAKq7dH174NzsFE/IgAakgD

cMVvoUYjeFZ4SLiCGulFKojE3Sk66MJdFFLtNEcVKM
hqCXBLGUjGLCOvjAG1fWmmZl8JEHkFYfWlsX58QmzST8NF8pyBNuMjtDEKSCCDYUhhSXYHRQPzSFcVXN

1Jf5QP2JYTGCTEIiQAUKfuCDUMOCOA0ES94DE0T88Ak3TvqZMiLS6Fz4L/QJRsBEmAbLwWqwHsyEbWF3

2A/eAkfBSXAWnA/qnvrwTbs95BivpI/DozAffgUvIg
IHaKIjCDZ4NJsXYcKYMJHFKlxYrGrVAAIaPdBV1VUSBR4h+Bl6xKwaGfnOJnO5GVylH6QdpGMcTTibh6

HVyFPITmQ/1w6nTpdY/HwdnXAY7vjjkXvcYCRQIbsFwXrTNmzH8UADGQfm0D6MHeSW8tbEdRj9WC8Wom

YXo4+j52LT8cQiDnVuHcTAqnwvGVVqoLJBUoILK9X7
yqiTWxMYMw2fCKhXnFZZIEyX1QPmzCNZSnacsqY0l95VebTEnbH5Coodk8xxrTWuykO3mvN8XezUOz0e

iffDx+B01jtkrvgt+Np9qkLSCFhcBaUbgSU9UQvMOkc8TPAIh7XRLHliIolejcC0G96XDlTcLm+SSCQ1

fe3rsLLX6v8zWl4tDBV7MIYG1Fm2zpLRdsSwHBhYDe
Qdi9NnVtQimEn1a9eEHow+M88ViZvdxhiTqlgfRE0XXG23MifgTdLcJKIDQyPiQvuObqg8G4EQ7PuJEF

Wab0JqarRaOPLXesw3TIA3U7PHtk40GXHM5OwvpWjvRe9I4qEfHLmufaYjKL5tCmU+HJpSM8PV8X79eW

oJ/Hy0aT8PKy5AJmNosD+iTWJEYklSRtJGkiNZKNku
BAu5MJdb8VgAX7IXaadoqINIJjcnImsh+hf3Hog4EMzIeDmSxlYdgLnoRFhRV3SXDgf8gOiw0ATanPcf

PXSqumzKtIg6vopOjSn1pUtz/JwCVcFKIUahXOGSwkuGOMOWEc+nTGPvVdGdEX8MQcLvCFvDx1YBvvsM

6dWycG2p63CKzcHM3gy4iivkFfkpDfkvlNzUKWQbQI
Xy7tCtD9iNrrdoiIu04zeAZdWa2E8Hi1Jg9V9phOLfBPW3DbPgoGM3lIxrxkg396qLGpKz1Gs6Uyk3EZ

2V0LurO7lU61OayTXl7aMoU8zx4wvhxZg42zfu7iG1kSV9g2Fk58qnabwy8KiZ+6pvrB+v36g/YUAxcD

PIM+gx+NQHa9NzDFH3CH5tuqF93PMu227VxIztjT0a
WRVrFgyq3oTmC/1cYyoXP1vzbAUHNC3vxTKcD7FH2abq7a4dxSqnBn2WYmRR9tblNhOvxi+p8HTGh0qg

uU3tbsNsnQWVfDSHDlaygYInlnGVQbOLvw+ynX3r0JT12OrXva1GFDVws4auz9eq6yCqol5XbgBiel3p

bqh1SAjLb7KAcjPLP2QM41Pq8TyEnzCQB3sPzmRfx2
jlVWiCa9rEDHdcQhlXqlgh98TfzmuSc190wtay91u64g5iLkeMMf/W331lvIoTchgfyIw6cXCXx1gBz2

wb9r2C2v/2kM09zEb9Batu+Nv9Qncosos6rVPVyz32lqC/CX8N/1T/kpGsZZsAj8OBvALcBWO/SC9oR9

TlDDsoxGTrPYtNZKmJaRYig73DXa+BWfnBqN5kMe+S
piJwMcsr0VmwtsWgW063PsnQQmjOZhY12Oo9HKxcolRnCjZEYb83pKuNdpZOe+g0vylsnjtbVKGQ1CyI

ZdTBqLlo6+iK6PkY+2zfxVmxZfJ3cTmmTpAc87maM+VyA7LHSEedfQRpOAz/u/g6pE1PmirJPNe9QZSb

P3cKBJinNlWCkKZ7X3Z5+pEeTNVI/Q20Za1hdJziZ5
pA+rkMsQxuxmCmVua+lBgkn8aY2zuoTtLhyrPM7gjWKfJ51tzBPiA39hDs0+jgWz0a4eUpE8095A43pf

y9TMed1lBt0aaBo4+/Kp74tbDVUBwRIypHA0072Yyb/z/dC+3wqa6h9uDDnnYkBiurj2GIvtygWFsA

+GP8rwr0u/VGJScrQUXcotHTtgfeBUmVhZVtnUQc+D
ysWS1xJlx7c1OgvOGBGJgrv/ZECoq5EbvpzUhbt02eX1vWOppO9Cc79w7k2vaHCuLbDGrS471ewMWlT6

ZxRb9PJ7z89fu6jPyKX27nUyOrmSOevYBH38D9t1DSY4Zez9nS/le6q/2kS8nTM4hbLFrg6izOcrjbvg

jw4/lfvgsER148dSziJnbWwm/cW029mD56Z155ye5f
5W/kz0u7iW0Eg7TyTedLY03unOz0tZj56m31Qa8epK83jDGnYz6UwFy9lNj+/S7196oPPm2KFd9brhHT

em+pg2BVfGdwfk25q/2Sd4hWmCsz6xQtYxoTMW1avBo+Z2a01s9nf9jFJ9w1S1yRSZ1E5JGNBesgvwm2

agup5kWq7z0IbzNEEulxA92n7nU5ke8Nu6QOT/9nA8
dJz/iO9e2sV3wljQ0d6gU+x6gnpS+HH3pwNw7DrJa5r+2y082B+lhVbboS8p+cQUa+rVb8m/uD1Ga5J9

KSYzZXR7otb0Bh3b7g0NiN+nXyW+Wp4v+E5o22dYXy1//sPmj8GFoIXpN7w3K38Wv5V6e/Kd0bu+Re/F

Z+7S0s0xBN0K+nDqI/KodIcRUsAW0I3nuav/aH7p+e
r+3ntJqesP6KFLMrP5SiFSJH7P8PMJUaQ0wZKSpA5ruMHbCeodMLrGOlz2K7wLhe23d6QIYR1YJ+CXDY

SURBKakgNDkdSIRSwBtBwSdEH9S0gdUgZ4PCWJSsiGdiswYcQTp9zPGRt4L0h1Mm2BSD5lf2AtGFCpWk

FwLF2kgam2MUEqi9IsDlP8LkFIGZ8sc5LoMttlQXYv
LndCYc4AG2JUWXJuGFN4UYFfOp5ZEX2za9SkBeakDTIwHdtMTQfMWGSpPHxqVCXlOYW8QNFkhLFJABPj

M0LswYmxWJSyJV6JLxLcL6jsqpv5yPW0REZyCyi+YoggeGNpSN1ZLC54mIRmb3Q9TVZwX3wjICOes34k

OAibPNLJIS8jLKSEYJjdUMjcNBC2SeCykgqvHXWtHx
67dAj9lAZhVTVrOOwjaZ1cOfo4NjZkb0AeYt6zOj2vpVZeEk6NMTLoZzyVIRNwlD4bydP8ypJtAFQyrC

WfEm5tw8c2YfteDv2iGt5lYUm6SdLbEaEuBUPzFw9ruS05TJxvivHrAq2KMXVzQxxUFHAyqjaaxXrqem

R4vAbxmvmdKr6wdTA5eRwaY4W0ivyqk8UqF7MqM1Nm
QK8jhaCxLgEvUI0jYIXmExxlOz68tE1sFh5IEXYkAvAino6lzA5ehNJtbIQhsRtroh8zHYD9J8VyUhIp

rf8wbS8XNQXpXswiedh1EDwsHgmksY7bvd29zinaIOP8mHRbK8XgC3T+CjxkYzpkYXRlPjIwMjAtMTEt

CMSYVZA0PQj5QTrlEWJ9MTQ3S0TuMlEdzGX+Cjwvcm
WdRkEpe7UsoRU3fZ4uIoj8zgQvFwXly0GloME2nF6zYUojeH1nVtFvJw1ftVV9vWwtX40qDbBak1GfTk

BsiB1pHVOrYQ0iQhAccxIkFuHzi7P2CROtVjj3sNNqLmXei2P9X7XsBjRyXAZwAGZGW0KwNpMss7kdvu

CjAkP8E8IqWbvDen7qoXQcxy4EBTVtHhkNQOWVNWZy
mG2cDcNaWOnolEJuWfZVWkJevwRbo35+CmqnrjGaViMdz0CcdPB7wF8yYoc7vmCmXgEzn9SqzCF5gX1t

LLdktX2aRjzuoX2xgLQ8iUjfJ98vPxKol3BtYcJxmF09QZXrUE5tDgXjkvKhKcDgk9A0NTAvKux5sT3b

TeNfFZO6g7FTn01aXjVjZXZyWZHEZ0MpJpRuq5bxsh
NtRkW1Q1udxEkPvcRjaW7jDN9tlH4IHPjqsBaFDJLdORQ4GMBocKK+XpDpVK8tZR7tKiJtKVtfFEp7Hd

6bVSxlRPboxN5gJc6jtWRqFHNfYWH9QV2UJAzofVbUbfKmmSKWESWfQqUvKuWpDAYdGKICVGU0DVv3VS

vlMGY9YXO0F9gltCpZouAzpQINKHJrKrr4W6PnOutR
DOPoanmfsXzjvl0TFW4zBFS6IaRHJho0I6q5uR1ejLI4YI89B7ovRQLbAUSsBZ0uQJRtHc0+CgplbmRz

lOBnMS1UYX5tc8XyTlxpIAGhQtgCVSH1HvEqDZ7tdbxuRnKrHU5nemx2LVanBT9ZCV9yNJ1FhIGJYONb

YRmrXBQvQR1agxEipGvuBY3IPZ5ubIymPRMqJXBXQh
ClL5ScuGXsebWcOqgxgBLLUIYwMRZVLAVjE9BziKloLTOwVD1zR9MQOMYcP6hrxUusKyUcWtXaB1yhmT

lciHX0LAiiBQ7TcDGtEEAfD55xkK4yGH68PQrZJYHkA34xo9FVoAIpIBMpLYK9cJHoQ9WqmPv+PgpzdH

MxIP1DkYiloOJPOMWQmqE+jQ3PKpCHWJJFCVJACFUC
CHLRIBGTRBKRGRXMCMCNHQNrSOPNSDVBGA4yw4TbUUXzBqUmZW7hhrwgPbVcPR1walo1ADvouqWrPzqZ

FQUgRZEeFyiEBOeEKULnNsNwXMEoJW2xDzDxZ1I9uVVuB0gTBdfhF7CZDUCdVMHjI2VhZMY2JIQlTmqi

TP6YiUc3YOFuH7PrNGUoFCAey4NaZuIwH1P6MiX0cX
RrY2euYPysUwMxW9acPMGvGXA1OYpxIR7MCEtzaAIxTuxRJTZuLru3y3TztiCasSUliyJnaBG5IaDoS9

XbiU5nT9PiI1WcSw0VK1NGWZItPFM3AQQhUl8EUFWaK25zy2rfDDYmXXJBIv9+DqH7tfPehQs9gN4oGr

xV7/52l4896midz4fHPeX3pHsrFvgjFFvUDTRdI0oR
RSqEEkqlvZeGESXKqsjMLP/DUPdcyCe1OL1K3/+833552Z09xFz66/18Pp/x3jcUcyiRJZFEcy+N6urq

zyiampr/lRfNGMYUeu11sFY93mxyCeEzkQPSdwfAjdqzg5p50G4hzyWHJEs6GNRNNpK2tXC5UeATDTE+

TkTBVB0Dz6kQJztu/Y0LQytboZJ2vbG4b5mHwmSrjr
sFBEUd6XRawMhbIIwUgWNkAu3eYHWZXrf/F16/ceK3pkfHGd6AHXBYPAo+nFCnF7c1sJvyZBDYgIxQVy

GpDqINXLUim7NwfThp8Fibx3GpjZIKEkrwHIAUr1HcDImFquwp1gq/lSvIIPVyc5SbrTap+AnCBNKAT1

VU0kD+IFCHrISkEJnMUlhYYOE/jvr6+xdG9TbYCExl
LGumzen2pMXKxFqumDFNsNFhjVH1eEp+iy10h791Dgyi3TfF9tm1bopHj18Cm5jmOHXsD9iat6VbguLp

0198aWwIRkZCOGvE1zM/nXwWWGDhd+Zj0115Z+nX5Iw7VLHiEiM+NmOITPCWeQsR9nVYvVPOt3Ft+BUU

JzOlhNm5UZjMq052FxrvvMXpxmJWtDKwozshUXFFaV
HOGOD0YNYdF1tlcjeMZtnj4dE3XVMGYzRMLCzbCM6Pkk93A+eDBRZY+R2eahcbQR9qwZZ2c7nmakJZhC

H3qp6iVNFA7DXSuwXeGm50t5WUxKCftupNuszRcTAM/p+syrk9w1zK8BmLgWYVPn1kgQZ7WTKdibOTQA

ODNd9eybwXWlS7/Lsh7lctQ/90tlhggYV/Sa6lTykN
P1+YtFD8sURnL5JVACKSyKBe8TAwgbACkbx8L2jhCkWTuQPIH5bcmxO9+/pa79Fw9b0msJLmMra0+Pps

7tw32znmm/kYTLmah9VFQEAnABrvPCumBDHHwzPTsqiE02bh8HkGUDkbg0+vt4/PFu/tAQH5r/L/lTz8

UlRXV1+2xIcb1hMkqFtpzvd1Eiuk93+wRMJTYJym70
63467zGNDm34eN45+yjPHd+/mkK1PrO6fJk7cAINV3FCUD+cE0iNk49+7/lwX2GTCvjt9gbIoya6TqHO

/Tbb58+XK4DbUIsrawb7c4VrSeT22TnrD/DXpJA7+xkPAnQwoq2EqzwUHeiyA1IE6yAnEfMURCIddRD0

iCgTS+ce36c6BOd9bDD+nn2SF8iRuuxXiDSBsQKkZ1
T0TgN3TzGHJmiHp7AXqjd0+rQQu2qObTn6Y/pCQFgSGYQiABdCqQ3cMZgReDhvt331iu/o2s/RsmGL6d

QCsnDkqrrkUWoyI6Qjz2GLDM6XXOXGiyv2BErQJf9iKnZBT4HZWg5N/UOi6BDoJ69gFtyxchTA8UrzIM

V4+HRILm/RxO2fTT2N8eXTTu5lK9V8XtJ5+5fftnIi
9+FivuPVNTX8e29nplnXtQeuqzUpwPBaKCJzqVzGWcEylnVH0UPcYLtJ14gflaXCipMBbRX2NIntuWO8

xfX1U1sBUJzOFpM/3PgcBm1DzwciyYEKrcfYECXjClYMWasjydB0ATb4UNgEX7ddj0XeMLGO4bqVXXR/

tyMuG7cmLe4mXrmjxqDsay04hkKILP1cQjVnfGgzk2
IQLaxwrh5aOoug2y9yoylu5wRF8lxUvhRbJt+gt54+eKUMJ7T4IPtPBO7WyQSbub9gMKBs9FXEEjzy9e

1qePH/6qWuIVrtyPNBfKUG8b4cw8RDaornclRsSyFhTa22C1Y1ZWUWkxXS46FrkahH0/Hg3S2tatEiV6

dSJVMw0bP07/RewwmU8menX33zHzPulKpcyp0U+RwB
1k+bFC3XFlRcSEW4sx1wNy9RyXiUGtPAiaNlC4VVSxh2iwwql//+NIGBfoC972yaYmijdnWI+Rw3iQNi

vgAZh38kn7Er77LW5uu7yd4q8xXMb8ELgYuWOqU9JXCcj8KVqJowkYILoLnaAopvUrC7iYn4Z37TsIh1

/BgaultUewi42EMoWpCU5dcAPIZ62bVn9zWnuU16+X
zR/Gi+dGuvkZ5OHqWvkB/4SJOPvBJDVq94XSqn+K76vYEkymZmkYs7h0nukInlANpfTo+Kvtz9wlBHz1

AtxSUzfdI6F85vTpKndO9cRNkI2q4/pgiH9OVPJGoeCCH+tj7zrNkI9CR+gL8N8aV+zYXEkyj6g/XDK7

9MesLiNuAi76woNpWPxtCupa/IH0kkiaIb7wGKEv5C
50B2X9CpfM+CPhZXryhVIMRQRTCt5tuuz/HfZ1tZzwmG/DKyUqxsfLBwodwzBPHz+CYFuFMFfWH8RlrU

P+vIrRuGt0gj49PeUcpqoZalDO+CQG2+G0OEOToTRehA34mCv/iweztVADJdf41lgutVU0vGa//iCHWZ

kVBmmc8c79OBZA71dCqQXmGmIKZ2WiQlGZKdW1jGk7
eOGCtroGyhuzf0/ohirh1NFxwmxlYq0gbsm28XNnR0EoN5J/e/dOgJvEzcb+a0cMeW3KV+jF8oOmn101

QCPm4+De3pOsH02fg2++jTpyBGpEXloG8/ciQmGYNTDYKAkdNpopYx44RNfc9wf8QguJDGJCKQDa7mw9

cxBGSEBgypTJdXV1+W9k4pxNE482dHd14mN8R32W9W
s6pjIK4hTwIFZH0P/QW3v0jfGAJhtQcQ/HUvFjwHhD+xj2H8qpCqvmbGTCn/L3pQTw450a+0i1YookR9

pzswtfa6JKmlytDHPBo0njDxlwbX+8c+yLL7iWGtmLbUv+9RAknTW8ULJXklcxBwg/7+xIJ8H13WsRdb

HPeYFwJwWGHLlVEcZ51OWhYrLV5eFeZmC/WXn5+/fv
Pn34BF+/gkC9cSkzfHr4Sr44Hf6t7LfQ+t4o6G1aAGyBZ+LGL1/inX4eHDwbiJa13O5aufnmuge5L8zO

TmVVYZxzl54wPIZzRJMJgG/Y2cP0dHRkulBr82SKnlvrm0gtbJaYDn1MviHNybfStIkeIa3oR0RN2ff8

Jjmw6hXkWASb+2tjqc44Te9Rwhq0CSkonv4m8/LliN
BjuIXHwYDLiynJbHRB3asORzkKDLfRAog91KKoam1eWa1z2mPue9K0urmU1CtEsZLdoDGv6Bxap4pVlR

K9rc8LGB8fLTlH5K2qvRG9tXjmGhcGthCE++2XHd3K45sytz+aobQVEEkN0kXgKEh6LDOtJ7x9SOWG4x

vkKxZGfz0iRNGCOsk+lhYkPj4+QvVmbHSWUn7nC3wP
PX8+LDQsJDgocldkyrkUqBeaYL+LW4Vr077ypLg1gCXVqr5AngnPQvRDvZiHP09y+exZXpQ6+sIbndQB

PCqU1iUW2Wlj/1PkAf4zAqZU5ZRh03yOl9KEd+vrtavXDuzb/zq/oL6+qwZpeCPryZMB/QZAUU+xn9KO

IO8TnoeGgAWMTKJNqFa2ZGDFgYEn5jDnUhBn0LaG7Z
xsiKecvAKIPRBGiJdfD/WR6+ZOc62zA4502zaDKoAiLhTBYAgPmqFZ3XMYV3/Nw6eISIoXIJ5kkXx29x

mn6ehp8TD94fgvSYFRNg0vT7riXgO8UtnjDhWn3D4r0dvHFEx0RRhn+rlRBZlsJ2OetHEVWYo9bxFSC0

eAx7sTagOoZK0+BWmZDevX0/cmLLbAHYFqVKoAiRez
1QTp4ef8fdtoa/T6W008Hx0mx1p5Hu/olur6aXkzAJBucOj16219lZnUtqwqXi4++SHAQhtx4Zx6M4FN

RqzCEvhOHBb87lHARsfW+wj9ImPtQyfxfWECDnzrdnKc494iuVnf5XQ3LG3ZfLHC+/dpG7+fr5+UuBRx

XVIHRRlYIHlHVcqCgIeQtKDipL44p4ZwnoK7rq78MG
GSq4WWvynOf2tU+aLhgpg4VUGC2JVnO5U21hQwwRrUMkt9A6EVZMhMRDZcQOrFOZAmAkLi5vEkSOapvD

ChtUM1o84VfwkQzexiKRpvIz5cTahcTYBhw7gwnUtFZXzPuhEye41jncwIL99+glKfALz5aEmWjOiQ4d

adMhogpnVTVXdYPckFDSnYZ1VGPXMp8vnYKSf2135t
IyWFzUYBpcCzijJyFoMH4/EfPniIj4+SH8gnmTApAuIMymNvLPGuoLaU+uxZsxrq6/OOXPn5X04T+6tX

rXZxdoY/YDZTZzhaexipEgulfSvlazCZiAvhxu7o5hoE8EkXgUzKmRGMf+37JKQ5qKVmZgtCFJkpAsRF

9h4dIrudPBc2oANNFsD5FTp2aDPgRvoTPlUkXYdsCQ
yf0YsQGHUDrXrvGAtT3dkLLinaO89R78teny3HEKum1KAsmCkZ8KWR7jEGCPJAnbpSM6s9THPi7Jvlfb

lKikpsKHVDZVmPHAnpgTrl4+JGSNLcovBNITH8/qx4XXQpSMO4dPVapMUbLy7iRJQVITtPt0PH7Ftca/

f+6Gv4uUxRsdMCoZ8o88pXb4saaRSILRjwwwR71Jwa
4Pl66Ob+mpW035ZLH3shmhPI38179+3EAlymgBqskvZL4teyKajmYaWkfjH9wX8rOG8oXV8F7zyIVK1a

R4yC9A/q3x/gVRvTMK4OyNp65KtFVspxoEFggmrUKpDlL0sEX4vH4GuQNQOsCXVk9RJw6uRCLrgaDY0F

mwEG7cnSUjYQ+yYgFK4zLHC9nZyS3Z7eXLr0GlmmGh
LxN6AbiJF5beSaxDS2xr3S2LrGrj43qvR9Qc0q3DFRC03OwdQQpMzmkMRoIoWB0e137hVj58nl5eiBbd

XkLIhoXONzvPsIP3uGp62ojIcOxdiJrLEEZAPN7+hCknl36pyDxV65LeAhyOoSWM4po0P6OCIAMIaPYC

RX9Ih9Pf9jSl85tCGUpJAaXfJcaHRqlbhvlpj9UjrF
ShNrwuNgKx6w4TW/59noKZSPRvCf20fy+DhsfVfXrOx19pNIfvGcKHCW3tG8p6H8oAAFtrkOqir3K/Dh

w8cNDV+tCDzl2kxmwtLuZxBRdes9n0qQSDBEc7lJKXc/88/LiN1znjzTT3v0KuvVNoU8X+7UDpwusiG7

B73mJc0lEZX4+U/hxY/gj2n1uzlEW6R7KMYtfouR0b
BqOK3K0yNFLUUvBfXi8Su+fPECu3/r5k2n+QtA/aQAO65RdK/HURJf9P3tRB3Izd32/vHjB+w+vHfBPE

eoViD/pYsWf+8OuoVT5wQIfvLar+gvJSYetGMnblU+GnHLI31SqQCTJv9zDU4Oeu0+fjISXfIGCGaPHz

8+uP+AeuJTovyewrQmguz7gSYVhR+0bmHmV67yBvSR
MsMVuq9yJ3HTpI//XnBoZ47nG0LflNa4WxxH3yMHYwyYnT3XuLDg5F1HtTpY+RxVvtpt6nKo+Z3EfTmU

XNMC1cr1zyZoVuE2M/2g4zUOBoQg0WYL9k8pggLFTc97/bpx/XoYjoV4+O7qNpkU8vLgpnPvC/vB6NzB

Mwu3i89Miy6xZ074oZAEVcj5vFtRIOI1dEr4MnvTZG
84aSBzxTZT58M6Y4CXiTuvJz4aZnAyGV3tyot07ythVrZCIDRBxcU6aCsgV1OOlJXyEnj0zEJQuOb1pD

XLF6X20IBmL7EI8cZmqMtgi8Zfcd6/wGVB5+Xk5DR//mKTvbDr38/X48HvoclEU86/xopLUU7+TAKtQW

wo0bTSaYpsMiPZW/WarYmb0Sdbh3iJDSE2XCVTEFnX
9KL64egIc5wttJVlGE0zr3FT6Vmf/0H0l2k/FPeSrk3VsDjn5z+N7oXzTUf4E73btn8Ee/v60Lp/tDQ3

up5hFGQY8JlitUfAGglIfqVzmTr4R4jcbMfRwGm5w5Nte7mGkSp9WT1XLuCAjOuDhwFSaoV9cpepFliq

uFbW67cl2lG/UsoBfcXZnAlERHe9o2oUnTVB050vts
tfFPGi8TDRKkzocmA5DrYrDJnoH0rcN0BPhyXpMLIlucAy4EYTB9U3h1hJHS9cWwDPa+0bLugR9qLhZ5

+05+pvcfM8H8G6Uu/pUmPrcZ0H3Rh47lrof057TZIhU7xwNc8IVSSCBhjMgGRjWs4y2CfwAPPM0BCV3v

0gYSJUVIBRhvc6+nRS5ZoRDYI+9vTsJ4WWDunR0mMf
35rV3p678hYba3Np1a2xl78D8dzn5BMCmhwgVLWd9BOwvuHbugFB+7hMvEaOUQDz4ScD/AIgLQPtgDjd

xmKu7qNIibXrFxk3wqSatgN/sAXTExq0am5wqM2aZUU5ov/rJikj0loKtlJRnQILPFTGmiu5SEZ02zhQ

UDPKb22qLI5Hd3Ox7iM5NgDUAoy8FZ7rIy4wxsr09T
kh++TQiC3qTqBW/VOWW8Dp9TAiDk7tasvig2zZTSLKmq6k5IYs/nzK+cbOi7Bxmjmp6AwkxJD1BUOeB2

IaCpD+NNxCgXXTKog3h81oqa7ogCBBporb9Si7xqweBZGm/THTJ7cmqCtq73gkCOOuh6j/8fzFQNN+8N

SmThL+Vm9lsPdgmBMeMetZYi1urkws197B3wwvkzTD
p8iUFdShJMXZof6wDl4pjB1ZFCmlGWpxUoJ+WIVXKXGCbST4usdL4NwGNZtU7yrHfnnkWJOmqSnZj5v3

gQQCAfKJyLp1mOno+66rBr97KSTlrT0E+LE7PqWXP0sc8k5/C0vNfBMUvSHosQIgpCTcf+FAoRqqalg3

KAuNbWVmv4S7SZdc8G/4+hlCt5B4n85V3qHZSfOSpd
TcCXVoYpQbgIzakZOLyEp05YNOTIacpkQLuGWfj4D8Z0qPVat//l9OQCMVPBCs4VG5tgmRqknmBmPD8B

g9HCTpn19zeVBZKbEx11tqUi7nAsSVR0+cFX3s+IB9Nw8z194wOba5GkqpjHrkXWuu2kO0lMjOkAlKJM

hJGAFNRwwZyuvge8YVvONYpZ+uCiKYJf7tXuVnRcfk
Gs1gWthx2fWeShZrVXpJNmah9TkNVwczXFZ5KP4z4tDaesws2v0pT96ZC+hbDuiSnz9/qahhjATF8so/

S4O4wt2lJ+HA01Fpw1AfaDXS0A9y4iQpFwuvHUvliLhMnS1c7eXRB+kjb/zY5XpmCJUu0gjH5CnCxekL

kpcOVnz5WdJuAXPeM1RCF49+V6Zw+HpvATkKetm+PX
t+CWo4R9FarhNQwVUOrBYlI46uyWffgXJCXUGy8v0oxv+GOVaOE8W4OfW+9wfHoTrU1mdQZ/zm45v9X0

uENL/v9T3FE5o4vkYWSUs80KZSbbHCGLCpDJnvZTHuo1CXlFSt1s/K//XkZ1O2Wn9CSyC6+4qNjZO5pI

HxFyr7FW+OILrO8V422xkV0DCisfvHnquhNUqAnJ6+
snMLpi8J8AkHmde4vwo2gazVvMO3qgQgpU+Mj/HGvSuYnOfm51QHExXcOshPQb2wTfwkkVSH0zOzDNq1

aickV1XpeRBVSAc0HbxAhwfDX4gxPANpPTUJEUOrleTAzg8snrQUbRAaVO1D2E2fykOCZnM/EpA6pRt9

hVlE2clY6hbbn+AANf/CufnaYa8jUdCUk7Nz+NChEC
4cTYHCwNaIcXxfr2icv06XyRsLhozmgnwvbOOB0tCl1TravAUhIQgTTs9mZu4jqR7BuWXHVzPWCi+Pkp

w8iDHWJxfjrHbNSwKe/OdX4vW7eBDZXvuQH5uy8NaTsnizZjqZiQUHqQcvf3kchPewsPXdeTAbmACOIF

AwNHEHSsRZxMIdZt1ZL/gwZ6eT7S2WFBmkqcERXwVX
qBhX1e2hTpySNoJ2eElW0EMWJ2LQvP/4AFfpcukZJqx2kejH7z+1KjZFqdgb38OW1534o+/CiI/4DJOF

mZhP7PT5EPc8WK6ppCPXe9+bjA4gwb1y22sB7hHOLxskJErpkAuwNP7CHGZKbWaeVCWH/Ywy5OiapGhS

XqLQP5RX1LQzhZ75P0eOgH1Yc2QSHgBUgiBNAumFu5
l87Zxycc+WLdidH+PJ8GI7Gz9WBDJNJYfAGhVijgRAEovObTXJYqNXuVA75hLunabkRDqKHETc1YmWhU

rShcQbmkuyov4Fy2aEooBLo2DLy0Yd1J3bKfxnA5cqgLUmtn3WVZYlpT54nBFCCr7nZFvbZMoqeBu8jE

Muc8mMh8dvXvCPxdR1Zk6cHjcCUmgIBWW7+ndGp9Vl
/aGk5TWDY0L92KI1Z68OeIceV83XeUBTk4uiJpe3eddqw+plhYUCMODlfQN4QrDCLicBRio6Xkk3pJyP

E/E5BZGNZUbI2Oh1n/wkCZDHSISnyG69V4yEPPYMOZ57ypd6nqlVpIkmTac9pG54iZK1YHh0dahrlI8h

A9Fx5ksffeBNgagg2zMKaQ/Xdv/e/b60MP+VXi8gr8
CbqTVwiRtpq2pLVBY4WJmmtbBBk+Xxfrx890vGwQbEHRGIoVCwYZUTc21LB4lEb7F4P4sRuQGA9Sf/fI

OtxXXjnSz7Ym8xJgjyc67pqFUNl9yRwWU5l0KoqUkdY2vNSYryeWWo8Vt77+KVLG1avIcBfqhV4+6VNI

nhDPxEC5ZQ7h90K3061gVdim/JxiCCeYJQwBqO6u0i
wlfqr1+/0xMFh5Gd4nLDW2uDq6U/Dpd374uhIO0T6pWeUpD2nRCZ7LIeeq8lTJwEVGykDj/85q+yb9Dw

If7Sy2fIu9MqaTXZAkxfFJ4oJdMPpYjeCYdJUOUgqe7yuNWUFb/OMhlfGgzczUo2RoreNeKpTxc4Vy8j

GetklgC78yHhkHKnKNyGwAaSARXDI6GbchRU+vhe5s
V2eb7uifys5PK1TNjQZoR4g3c11SE/0qEs1GETfdqJwyKyd/7s+OLPBGM27KuTAIrhymLMPFeTsulRiJ

RYZTo7zKFqJToCEMKOAWoW4npI4so9voE1Xr3giiIPQ2Tj4PK6+/jMME9f4PSW1F4vQ3opkCVsMrjrCW

W2OERtRxn4zN2TT2LPTPpJtMRQ1JLP2aD7xNg3pi6V
QuxPSprJGEeiPDuaSQLZvrLQgYGkGllgq1oSQxRPnVRahOOscc1aetqSkz7rdqc6c4LjJqcDVz5Q360p

yzxkSUmJvBwyQSYhIoprr+2/lOhTjA2Hd43ir9yiwrUfJDeNI3jkbGivJYDG0NOPL3zrmicj9P9OSjv+

hzd6xe/zFUIphIJYZ68ZEuALVC1bHXS7vq7E8/zFi+
Ie0a45xJkUj6n0gfmUdBop+Ta/hN2m448STTZ+kps0SgbVSeS8vBXDPUNzvaZtA6srtyDX+KaNnuyoY9

1Yyvf7HjSKd3rRTAAb3r4jYnyWiGCnDyuC4BcFpmyMyMRrPGdClqg98sflWLxWJ7BK5tJkQ1m20MDj8n

3+duhI8nig8F+xtPMG3ieOvPi8T7SQEfZaMyYEYnGg
1NePiIjoae+Ftq9fX+Xc0AjORPMvDveHhWUWDjT872DCApP6jNpYVgprtIjg5RRwM8XEUHE3WPC6mV+B

dwkkJjBNy1EuORQ7EvXVBD7+rdfXMVj3wL+eV0aareIbJMnz9d/HhKvtvyISEX1LHEzvtN+X8GYZFMJC

YASZQJiH/R95bTb7Vl1GpVkpgTgTP3WgEn7nZJNJcF
Sp/1cQZSZWwTH6KBcSM/HLe7fQYCrem6pV7VZ21HcNWi0MO8fv8fORW+Xw7iwedGm7tZ+NTMrNs3oFPZ

2RK0P1eg+gzPoSGSh49GNDXwTkARdt+DDQJ1ArzXsLbEVT8DLrt5ksUp13qHj2gfYrss5Y8o1EmlLtzD

hOZlO70FbRDnDOf/eI3N4Atq6qVeWtr9+3D+6ngZ+q
7iaMBj8OrONonIkHE9mJWCMNFReHThhknSR+gf3JoBypMIE3CoSYYNQTNaIQXa3p7H3769bDwXQ1RtqX

sOaaL0Xn5tF+xDpXv40UHZOqOIBtowZPXSipbkhSUf3DjhLW5oj4dZjzYAFhi/pI2FNVKC0f2BlPzQDA

EW/I3yo1OejKVzG7+EaSc3L0uhH/k6fC+AUKbwxdR3
ii3XwskE0RbZErcy7I6e3hfaXIp/zHeGPI9+iaMiU5yETIZ93VZKOPnyqhs0zh1ZBbfGVZGaFZdRaXR9

h7d4aj0/W7kxbQyZNFRwkC5Pfjs6pLT3rjrJqYGyekgzHGmFUcZ2oLIDkWqfy06Br7/HTVSojAhAv82l

2UktsN7eTGBZRKf7RVUXHYWy0hd74NSVLJw/kyD/wm
E08AyEPWust7lrtHtjK8J7Z6oGwjezmUwj0EJuRRkGCQLr+UCgDarh7j7U7j09D/gcnja1lnluLpdaxl

dPVVJaxVHI7ZXO8y/ul8qFwPhQr2wcPqBj4EDcnohMi+yTivQFL5spsFMVRmzLF3OUJ8+GNC7GKgFkYQ

NkJn4t+OeZNycXCAXkO/RwHS8JPjllPI0KpVWRt+bX
tstSRHvFbvx20bjWz9i3/McKwKgXPjnkQcoIQHD1X6u6h1wBlh27UMIxCDFhfuttR9lU0OkEwiqbjeTK

0DlQh29zEIl/HHspXwlGsxZt55CH6Y+88mj0BeLKPwD55ReIUvKPOc1bbL8GJQ4jKsn6gz1YagDJGEO6

w5DmuY0Efex7bzAlBk4cgMKW1T7Cx4V/Rfi45eusZQ
Wruock+0xA1PNkTgnGuA72C6yAP+K1VQR8pYoARPb10R8KaEu8lasRwpEDpYXfuED/Gsu1q5P0iUzwsU

1145CQerll8e5V7kWmVob8LEZM+o+YRToB9wPVuEkAHGhsiVU602T3JaM/PKRxmFHffc0lX54tNORzaF

qavu6qzYvL//vlX0p5x3Vt/vU8TDg1CY5dnkkvyqi2
Cwg8SubqQysvoKWTmZev2J55v3+rey0e8ET3GMF6BZI9ze+DWFc0P4W0Hi9+/q5fVZCuTvP3CNKtfEWe

Z0HgDF3UMEeCJYnZQCCp3ysDKvM3gOMIlsgLPyBWJdp/jPcwFVwhiKuxG+f/uC4TGCvRCd10mvnPNNB1

CEK65LbDze+gI74cVdbwnh5l/TzUpDsmdOm/HhY+9W
x7y8V/5+7jqQzpEZVvcheokMFVPu2vMtcbryFnaiVkZkqiONHhwkBziuXNqhhDoMFRNbavwnmv0oN3ST

alHpPErVOIeaJiku14nwLV8jC6CY7iGoC59+a2ZDxHh2lcPeqRgp1LAyLYnMAetp0Xt897uV5fKQlr6E

A22a0uWHWRLXS+IHkEWHFP1xLu5AV/S3BCdXxqGhEc
sxudIBTKWp7YvQaIzH0Cs5bMwUgm8+zWRZb0BiZt5cRdbxH13fpHLQAcBErUXZ0+SUh00LadMhv68gAC

F1HeSJpiz5KQCw7rXLWi9wnOk/e/wX8McwEVz3mmHP5eCFoni9VnMNHLMzoYaltQTVUmhiULYc2D4HJo

hnYBVMVvRDQVJQ8yAZF4LW4DZ/p/s+gZcyLhmipQHy
Um9kf6IIkBNV8fCESBOanSpqQL/0WT0pgCrhqhNFIGzJKfjoFZ06zSqkhSALDu0imR0X8H1WZTVEHUqg

2OdcZtjFrYXfLNTTTO16C9ZAB56UuHJ6fW45NA+Tp7qYkZdXhGqNPpoeAHLD/SaYP3uFT5TGtZnJYO7g

zPvO1C38wjGbHgJQFrJY1chmkFFNpWgx7aO5IF25c0
QrNeI4IEvDKptZQpt5+Lx3z3UQxxqb5sxnQtUG3qK+N58FdeqylkJr9vs6goyzaDhgTjD810dwv899Q5

BfUioaBZ8RWqnSS3pYf7YC+w6PA5vbDwYkOa7aq8dvH+8vIVzA93ysl8DFkGQK1O+SztKnUFxXSmoD2v

C+UhB7R5m9qcQLM32AapsvU4bcltTrrBunB1B55Cv8
uYmnfC0DU/KgTVJcTQTZzm0V/xKw9vGhjjcxXq/VFPT7j09IXrxHeWG6nnlc8dbYWgmugIJofW6mmGTX

PSW/m6QlkRYBayDTJCCH/4Ef+7xTKEg+L8WzbQWcz1JyHByZVrMAGesKwOrCmnm6IIROSCLrt5MiM949

k+henpWvZctOTSDHPu1Ftvwy3zpChaAOwLiKJ4WC+2
/lwrioyhr7AfphtYaKS9d+NgwfOAfNlKvJzADxSPRCxiCxh0Sccg9+S9JUa2BsHP6jB1/rVTM1jrXcVN

bzJ7QqlfjIVnazztXQgLTb3WTKqxtFhDTj5m1ZWR+szJZ4DDkw/y6q3vDdr5ttI9AJLCmuCuYnUoYdbf

wJ7CyfcFaEwDr2zWX0nhF2Lh8XLHUvrGUSqw/LCko/
vpIMSrIqBZ7htIxUMDAfCw0EStuBvbIYhid+l+47oRz3PcikLYIDkjVFo9CGKXXrrSLyGQANSeQUoFPT

qSAiKaJ0eTSbxMeUCGFjf/JZoMqGhxZDsPDG8jupO6Xs6nPENjTOwSrXMZx2xXtpPkc8kpf9jLmWnT86

++SQb5hbEroN6ABQjhFPnQfm5aDzcBQXYFkWn5HIcF
wFxm5qOCR4GR6KDiXScp4dCQVT1ENfN5y9DU3qJFJCeha6FTRwUMUl12gJinQnKSYkOknNgR4xe6FJRz

RVGi7kumH5lFAO2iDPJRUwTBRd2e2+mbPEjDOyFICEMuTMMMo9XmzJKRVYNYNsPNljVrvXVgKdCn1dfj

52AqOSlgZEt9Vmn0S3+Tbw4xN1RsEp33G7bSaZ0mDy
ZxHYN9T7+p+gGLHkQYsiji/VWbcdL8Tgc+YxBmyMqdqxXE8XhjojPBkZmkhh6tCfgnb/Kn5yIrkyYgSE

DQsYgQD/GWPAPFLtbtivXD6Ao1k2a/gEOXQI7ukg+PhkVRM88M0Q4fEHHxAyXGJNY1zRnsKcujSAB3p4

lQyvMfdxV6C8sAmIIHLwl9mgHHEUMEOAyp3DmC94Zd
QqxyY6meJSAlN2Uz68P/NltyjiRGUO6PYZeVLlXpfkRRpo90ePejwkLXRVBNlhkwcoJtOlOz984OMpig

QuePhFrr2wKgol27Vq2GCOdIJy4fGNwCwzyDgKgQZxscsyluchY2xTSXF+CumofeN/gjcAp0+v9uKwgp

i5zxQeLoo09ZZq+BVWDBKLOZXHJ5aBgMj5CvboJ7+/
qx4AnsqORVQdJERLlOTGxoocvGIvEOUOnDDbWKCQwz6LIH0nGKBZVDbwchoENZdWRLYu1jrCHGh5gO1K

6CmCfTDb/d4JGCuc4QO+gg0g8C1fCqzbOgRzSTgmMOWXIeABj7L91AH3G/AJdwdt7UwtnaaweoOvJ5OZ

U4t1miIl9Tnb4gli2cpS7CNN/NXsKrz0inGqpv5i2d
JMTBzwdmCt+PhoAZEuzOE2W+ElHDLMVFQ2xAcCRnV/JJ2sJsOXR2lCtjpmNhSABwKOF+QEsoCgELreWU

zNx5Cbn/Zh4w17jZFXopcNJrp1D992SfBkgE84jvkgB0Qv7AXJI+A/Pbj/nEWZJX2Pwp+KHRr8fbMvK0

9Fd0y6wIEGR9zCkg+FhjnLBQhQVzuuw0tKxz5MAnpW
aekrLl+5oO7rOHUj3Lt2IQWlUoQRPAoVbfYKyu9L6K2q7O/e9kB7U0HUkJiN6uh0i4quUvjZS4xf1UFQ

Zr58DwO/v+D2zSG9yTPcpcEEVYBJxTPEDNiWZwkkaTLGRElHvCgwrz/sjhv54fIR3Y6p/r47jz9nblvT

UHb3X4MLfQX1HXotnEBrpl2bns4eAO0JAFG/5fehou
LRw0f4/doKfh0eQ7XRuV0//nx5JFCe49rlT5sIc5c3951bL7JMvyzv3Vfmb9XASpKmcKIQ0nVoUktAJF

SdvgmofmIb4gh9pqLqssIykVn5HMqunzm1S66Zm2Td1X2nuhoet9zg6ulBA6agQrlS5Bhxlqglr0B9xK

DxuFH9wfrGL1mhcSIrCMFL6Z5gBtFjlUAUe27n6OUi
QFSYZ7x6kkn7u9k9gVJVnW5eTill7dpQspzxeihXbIca4rOpv722g3uSAm2UNjkR4Xv15GZhpegjrBjA

ZVtnGqUWisrfHUl47UvFcJWqaOg4Iahoc1p2rgvWGFEou61amsXjXe1+fLm7O+tiXb/m5iQ1v6+6dOHC

nU22sfD7J9+gwMXFZbXHypUeHiuW/20x6FWO0Qkd56
xe8/Gjr8BSFPw8uZML2CNWewuSn7dofw9JMMQZDpc8KGlfrTK0Jz+tvq/vT9R6JZ/zuhag5EgophiJhh

V52GxXNIoe7vyTCgM7bL2U2zHI0iYO8fuKpqz4h91gj/kL+Ah2850gR/mpy/eYNmn0eDo7wFBe9xw1vh

sCtgdEHz+ih2HA715Rpsi9XSjNzggkamKz0O3DowaH
xbp+f0KGde0/dYfsKWXumPoV5zeaq0oxo6zcz8hZcFrTfszcnmDHs9cUoFGLMEBD/jQZaqA1XEDqBhFQ

KMA71BE9X4e/X5Cf/+O9xnIxewEtMvuBT0htVeqPRLcodk9yAwmn0k6IY/wjbiO5BDhzoGjBoCblBnQv

UKiXoi67fbq6ituihDGOVbxi/FqUvH+PrLzoYZct+q
nIsYEZDluHeexzkfMRltpPt6LGlI11AGUNbq7HzAnqmBJGKhLufk2fy7NK+DZk0W8tE3giPMLZqAaPpG

gNl0EfL5k58eRQZuom/KcDcXVbXSqZpcHRzW24xv4P76pPVtdNj5X2ZXT1Vo1Qn7+0bEH67T+WS4CN82

RRkxNyPWrtyIQR9elqKrXYQmD1jwyDdN7newKT13yE
vyzPLPy/QN/A9msB09/Q4pQhhEoz6nIvHuYozHCdad6+bpK+oj8IyTT0uANVMFLKTz0at9Zz4SCBkvJp

tGisRlBEjXSLmL5+/Zaeynjn5/8t/LTW9R6F/8Wt9AHk+ujS6NjzDMJnFeX4EDqBL9RHxSRuDFWFMTEy

Ef14WPLq+h7FMBGwyeKlDQvkTQ7CyudCl2By1/27bg
qNlvS8Jrszlx7Ji13NrUBaUVHj30Hr7a0xDlwHBptm/E0SY4rOi6uGzAZkIjJezGQaCd9k0avHouf5Os

d5vDYoiQgIqN5zo1rtxJ/IG0S50btl3q4nxEFysX8smRbUytKbiDYuUS6mEaPlDRxLr1n4Uoko5yGRbz

3r+IIbUwewrBzYJuej63m4BBVi49iaqo846KcPUutU
c/e7KCG9QRd8q+rIYiv0mJ/SddfGk660z/GvZ3RR0AwwS8K/D0/Rk6yKaWJGq0LToq/gEMgI/knRAy5b

gUR0Xd96giFm3QVXWWnEz2KU4k20KeV1hEh52xOXo9Lx8XB1yjX0QLdYTxVlPN3vM8VJuCqNP5MGIBoS

5CqnScOmYgMagETYPvcCZA5M9dvdjvYIDjGznFYWd7
CxpbMXh9BjpYnQVAkNmIvadXCOwA9YSYapmLd7Mnft59oT/OwkZZnH7IAKZZiRJGLPks2lF1XsLj15GP

Zn8m12quTSnTu9+3b/vn0Md+BvZoNSqMhTK6jLRsuVKH04Tm21DE2RTwehRmsXnHp+6dKlUKjHsLAwtC

jV1l8tDhFvaWlgSot+sKjTx9el6N2oc0FRgjZV0UCP
mO0umnFb3+1kZ5sjmXQZ7zOR6FZn35Alor/wgCRLHoKVOyHiVLSAhZrg5DhFDBlwRaDTU6tQxBomsDsQ

QanMrrhIEgxOsif7iKwUhcP0X+aaJ5vemHt6uBFXrKg8Sotckblm1OcGCZVrk3vPmDBp0LrL4a5ScBwK

/0256apgGZD9nbb+zLy3JSa0E0IP+k/qujPi5MpusS
z8ka9fVAOpbYKLxvxW763+8zBBUMcCSASsCpte4OEYBfiNlszoQPtZ4EG6lUa6oEiJTmobe5hEE7AJJn

wYnDQG24XlsJa12ZXhOZHgUq5kwMcEa3cm3nAXLJP81WFmVH8MINYrI6y8b+kS7bxg383lmwHmzkC9rV

OQrW+oUQKSm7j35RLRpASNdKvW+omZNwC66bRor94m
htmmIXEqka56ZJUaX2jjwA2jd23DkR2OuHWIw4pa5sbduAy+xo7ovPTkklX4qDdypKtLVSLgmiiiKJBW

osvR3pQQ178jSJUec2wt/Z9jXZrRP7dph4HhOhKlbIJw29eZq35Nk/eQ7iZUp7nr3r6McbFqPWsVVEvv

qcwp7ZpzrDaCr6Kud/MCx/mG+dmt1gzIxKfGyb1b8C
8DU8m7zkHKzzblf529IeGuvCRpagKc4e4QDK8ZPabNbo34bJBmRsLfScUsz0o8Ee956ZP07HJuq+fMef

xsj6QxEDWyx62Nl/N4URjgRYuEYhqUHN9ItYcegVA8rcJaPcz1Bc4rHkBajYrhWeC1YMk9KNQX0XEbn5

74k9t7ZC1wCgt+9QWJiYlSomLIzmPorsWYsAHUYWlu
Oi44cULMgOudQqZEZXV1arCpSKRc2+63zdjIKGhnYN+6L8zEp0c2aX6ZU/DFZit9kk2bJ5x6J6+b12/6

dfbgt4uftLxd6v3YDAIYjK+wtBci5alcyj6s96GPnmmXZSPCKWFpjesGaa0JbyYt/+lpxRxmIot7Zws6

1VRg5lLSiHe3mWKYxiRyY7Da7erZzU6DFeOyw6PI45
VqTHxc/ID+/ELTfYY5AtQWsWZkFgppy9V46bV8VbXGoDTjo/v3jx/PYMEhZMMHOpXrt3gCx3+vPir4Pq

/RoyfAK+SlZYhElHI+usdFbM7buvfgTQEw5wmmLIhXjkINehIxuD8mNBjGrKk31wJ/+ob2eJ2HBT5TqY

tWZcdOXAc3tcP1qxs6NNbz8I/JsAhVJZmoGpWVH7Fe
G1YiDdWitJ9hNZ/OzsZWUNDtNJ/QoODVHiukJCSSEhmcRzl+/Hjos/cBz3zlckigOvUUCoGeCCufW7TP

Thyng5G0BLedGqJb3vj2SD1UOG98mzeinMkZQgsrfm32Ug1ZzGilL+GZhZjbuhcvdK7+Is4mH00LUIJO

2wPpe884EBcH5fhlIX0Q+/wZyxGitfMX5nBkRoOFXI
mp/mrtpcFfpuYy0Xd56bBa93/0IH68gqBYYDz97mMa7rYHLUuIUZXWq7o/m2gjAlkZlqzeHkgKTehLm0

OKwj49PTmsGGKdGaQY/cHq2igFYGyzh4hrdV5EThJCFhfsqjFj8OzAzllpaFQKGvIutMo/ra+f28oIC8

9P3+P9JWFHxwvsuqmHjfZHYerTijZYGPKanawY3lRQ
gYQ+C56TtRVbJOAg/+nI2n4CurIT4ISKNsx54Jy3FBmSBoBVdXMWNrc3JKtNHFr/6DF0M65lbrgt3OGa

q7gvOxwdQpAn6Nilr6c7BcfxEG/L2rQSigJltmSgR09SWjG87jz6wMztMe+nnj+hg43zEapV9Ap77XLD

fyXdX9rl0jVSMhrqPU4N9yrgDlTz8nSjWBLFdZUnl/
Bpda3ASyD7nukhCV/DiDAbtKzLisGJFaAdZVe0hk7ZBqgnPZtpq05AF0gkk700ijHzZ+xXeyD84eahbE

5jyBO0xAVP6HM535WAIEWZMKdGwaCRlejsgJMaVszZUG2MOMOgnkqYafvDDnK45Iz7FJzHdRq633DE2d

wjIOblqnbnAf5wsuuQHLpYZsNANrRl5v55/jVG1LPu
lqz6nIhP+mh33KHxu5urOKZKklxnb1tVebdAh+wGDNfwkMXQzVdtUHWmOSCV0ui3/zb9Fvq07QfB2+Ej

0yl530g9w87+cK2WCCaBiyhGKCBG2o8KkpJG1rgQxlRQKxibBg9pP1kRodS0ENgzEj3065G43g65zOcC

QiBdeLvmW7wmwkh0rNb4r+bXkKBgWUlEjrIaZrUVHZ
8A0ygYLvQnbVCxWIS7wZxK1XvZfqJ0vH3L+crvOkXFpGwsASGhqL40WEfn0FUCk78q2yQcywxfpPKde5

KmBMTi5gXK2enx8exn6jxQMD6fsuTJjf8wUOrrifRhx8YVjKDLWoaIs4iHsuFSSki/3/5UHh4BBYPhLR

pOK5tF2QaNFBvjcNDnb4eImHjI5AbZhWtO6EOH8fsM
u4FOAxYShJ1J8j9VUxdXytJwmGxY5tt/7ppPQyMFabEVzQpyBsuTwXARKvJ3958CMaFighGmMfKEqSMH

IXHOWl3R6WPZIQKYB5RbjMYJZPl1jTWSPwjLBYncLLMaShX7md7XNIIXGtPRA32nQR3vzNUAQJxam3WL

Gc1rlOKFC3wyLBUZvo74jHjC4KUPTu8aNog1luFvp4
rrNkGCzOFsc2e4wWggEiE0ERmVM4TQx9Xd4rrIkRq0weO8LDOKMCo+xz508UbTBNO52/Mu4mYXmABBAH

w55s3tDS5LRHkde8SHlbpUEy1rk5jTJbtCcyrNnUp18k8noTVkaSGHh0/XA6jYMuuMOwobkdGtLJ31o9

2u6BpCbee8EoWG79r3vb5XL84oW+SNh/fvw/3YmBif
dUaos0rxww3wGNyPLEVgVatq4maLl2Ef6fVr63hKpSLyudD+flWSkwvw9/9cVfXixYuysrID+/ebGBgR

bZv5+imcibyQ0q6VFJytAV34Am3cEz23ubMJnbCh/1kOv5m2pLAxecOr+hJ3ZxY0+lcjtNLovCOleD97

k2vF21FGkjjYPt50TOjUrgn140Uq6WYyp1dJzyLo1z
SpU0F6/BghQBWc3RSNnEmDus++wvacv3Db42VkFGCkJ462+E9HEFEvHmel4IoM1zGgoSbNlq7uz/3791

TIaBgcPvsu5e7gwZoUFboTk9OxF+CE5yzAPVny6c+qEu9rxsE46ZImqV5Wmeg8YMgKIyEjVzDWPs1N52

+iB04K5iXBQl0MyunPo34hrQvX82kFlJvchqP1aSNg
4oJ58/i6cU7g7+VLTbFKxyP9dEtUvsSmxZ8b35+v/0yEj8tspN/y7zSu8HspGb0jK4hvfdCMI2Q8UTME

XI/Fxn8AQtqJQC/8R4IBL1/O3vXKUvpVj0gcTEh7+6R6Bxs3ZPd1rSygMrkENErV779PTtICWdgIJo29

wBenZVnLfhuk58UDTb0SRoicycBYAhanp09Oa/vmLW
5Obi4peKi25RYQT5do2cpbqnW8Jc66P6hvc5ksCrtBXTdUdEFNPGdSfJWcp68lb4uX6Msgmm6YJaFeFd

kD+2if9YdIYmch4yU4AJJ8TsJwUVv2vZ0ExWjwX0602ODX3Zpkr/YrLpyNdJPWSdykAdUm68dWiJEZqh

ieWrzlSm710kR0J341dHR/bs2YVyBcrmcUn7fzanJf
GmESD92TBaTMJimmevnVsViCeiahOBjNZhyEAZZL4ye7sqRGPpPRotguZrVAYZl1ETG1tk6SmDuavJcS

mF2W53hyEo0mqRl6lMhGTIr6UtCRv7NBYaJDn9jDEPiDCYRlHpps+dOCZMpFa7G0/+UcQOL7g0+DNNoR

NkkazUfrnPsFcsENKQ9Tv6n2k/Uy0CQxDRNiTPwR0v
7OzniAyk+VIiIiRA/7ePKtqrmbjQQGgVWHStfg4GxP7mJQy0NjEEbGrto6JvC0Zc/4+/bJnS6w63oxVl

70pDC4Wfwhs/ppLtae3iPXlVSgWvb88NQZgTyldoXYCMYG6Uyj7pSNs2eGnK4Cq6pdAMNL9FMoZv7QJO

c9zqWXpMtXTVVaqdg7KOus883qqkQHbRmydBWJsDCR
s7NDx7vX7qsv2/HSfFIP0kHH4m02+eK82AlMRhTtsyYxRQeegopUU3yxChEmtBwff5sf0j1Mbg6EIkXE

Bnbdggh8F9CQyN/f4TZhJNVcdslbJzxlRAZxIrNkt55US5GtAqOoZHPIbC2WqfJr0JG+Kzm4vRNbBILl

q+Mqhw3qQfad0ixEEmRftgZSaaYdwtXcONBodsZO/t
mpOk3SOcl3n4cx5yGX3qCemeKWrwYznmye70ErlZipwPsy4c5M7HjSH27Ca8xggOb19SVoEzGRFRzXJP

s3BU0Zt/sZ8VOXW/r3K4zDcmQV2ChLF0zFj9pyieAju63VOlCQGzJ4orOx8yX9S+LbUDn465fWHRKQAn

kvupSgbPUk5uRA/V703XLDs2MOSEbK3kJW0VAI5DXJ
SAnespSj5HWOU6Plnz+hqIhnkj59E8rHp5DV3e3+5POdTGi4uvNcr9/70A+Evhvt06KymcVSLWkh2vSy

G33gX22Brz1hRHGvZawYjlxSoIO8QtQKqtFcXFooeJWP9Hgg+/f2Im+9xhKHrVnoKj7FN56uX6agIPh3

EL15/PnPwQOl9W+aMGIOvYbvmrOemmzW1jypFYzZYL
lq6f95yS8ZOInY0MgTqH+pH1cdBu13G5tfWq9fEAoNi/U76Z0PhKnr+4hRsiJ927k8syYGX2UivmU1jH

ZR8wwrYnoDQObjCG4vfPhB537N2t/GIBN27FB05mOi8NoOvdA22RHGR5BPsj4NUD+Yv9Xl3fdaDrTHEF

WKmmxJnJptPa9gXqzSCvPs1tcqxqZYzzEkF611rgRN
pik6Jb9D4AW2t+1eM0569pwdiB+pqt7ioj27uxFiXOFlqYPHa0mHSB0l/rcwE4XHjvi33JW/f/4cdzou

R5ehphR356ThfK5BHA1//uyzKRtDqpngY94k7xhS8rWHu9thKJOF7fcxeM1373aJyBP6kOiUrxJmEo8f

lzX0gwLYQgTo6mNZ9zg1t+oJOjtQjYdP7F1rHO65ch
38u0nSrJcZCGClB5Vm6w36oVXQKiqmo+3ulFrh0wesWFD9i6f0n8AE5PgasFYPr1SxdHK7PTYp2mm2jQ

UutFQiOuxp3pJcFgV/KXdbYTc0TPZY2AUul2KT0JHYpQgPA0pyxivaaDQWm2K0Z6a5C82D/oeGhiYnJ9

McwqELhQekGsDr4dPd2DaZs2bkleqHTKu32fKL/bn6
hHseWMGLpeIq0hooi0gah7IIKNXBgWEXtMcbmlyuiuk22sQdd+mucKzzeRtPslbGIDcUjUltHqp6QEyi

b2OQfYPcc7IndyOxcJ7CrTu71SOqFalr8xPPtJ/AE4KJjmYUno+to3RTgsU/J4kKy7l1zgPh9+njJ4cO

NPz6jW65uG59t/8eug/ePqFFQWb/X5gJQBVnl7xJkV
WCPtXDbv2AGo2a2Qt1J+LG0D+STSUtgONjV87KE0Kn/o3Zk/9/4Kietgdn8GjrAQkhyvkPDdnIUCDZei

8OCxcPZes7MCg0nmoWMPhJ/zjhbcprTBc6ptInBjBX2C780Bdf5kKzwLfXYV5XaePbjzrBAtv/v0EvKs

tmnyCHyl206Cm9dlU93JzFBsRuFcJHJdhe/OWgEQzO
od6ke+uRJE8m5SRKjZLVgIcibbRCX/6KHLtg0obmEPQjvTpbZY3+/pgbOcumwQIL/4JsgpxU0b6/fuTl

6qOHQa4/H1yL8uZb02lSV2QywBllHlqINtjj/RnRhPzOEMsJX2VQWE8lLKJpQayCaTupZfDKCA8TkgSi

LH6d5WH3w1szmGYW/zqnq4bV2zkKfSXltqgF8pUZZS
/PvJqppKhU+OqLjpUoFk910cFFh+SDqyP0AxARHxzlVS5p0Iv4xILsN1oEjvtE/Nl7aeJcYLXCCeBabM

jEoTDWAoA5UMeqqLC/I1xmT8QyxFr1Tv3vP3mQHOwtidAfi7WWGo4+7hAiqJPOoyoN8dKLIJv3f399li

tS5BwoKXr8iOk1ag9MfbGCF/9h3Lh+/gmm4cZxSR4z
m9JSU+y718qOQCIFJDSZs52q4Y46NCvlgCQZXXYKrc/D/tVhiRlmFiZsUAR1uyYjqBhkkcZfIisYRCIh

UUXvWykGSSDrJiGHMP4fo8AzZmZ9VWHka0UtVdw4KW2NQM0hnYhgJTnqURIDBW2IjCk7UVWrV1RiABBa

XEPqj7LtPW1+WjD2jyVlrKh9zATGY0MhxvP+51fMy6
sEIQ5U44QlPB3V3N4TeY1SH5WN2oLxBLiTyCstus00+EyMXsCyK6yrqUqse3Ul2i985k4AcL5SIpOxW9

00IeG8aiF2CdYONQl9CxJVum5CD58s3N+v01XbwNQm2ydmJMCJXpotN6egcRRj5oreeDIqljppqrBJRs

H5eONkpu78zCLIuaqD6SdmznZ1msbunD3T3lkLVyZH
qkNzAU94wElL8aSdup7vzyg1bLWGOPagCZjHDJF5BXOzcBIbcn9+GIzZKMntj3+x6c+njpGjHFxc9LoE

aSM907aUUHuCVMT4+wxe3+Qh3YTDECA8VJp0mst/AorHBXO4J5HPjLGl5RPZqpE2REI+Z4rLnm91LdqO

yK4fayi87A4/OPjSw4eH+TWKceoG9fn7JLjzuu5Occ
MV4QtWAAcbc7lW3MAKEZvHvadpvI6NaYK7oC66JO+g/ElBTTzNLPm322c0p79Vea14J94/ensOzLz0en

2xYW3P1a1EI3652yQd6Wh2p4LStdOH84NTZSOCzZHJGW1nqXEaYGmnIhda2MYRHbKLS3x93TvGZ314Rt

oeR+6OiJk9iqs1b9y0E1fnYa+ixXGlqVwiFHjnGa+h
zuL7xI9Z+rnb+/WibTDfHgw330Jo1vCcl6urUG+5FGAAeFxWodh/YIE+2tysplJDeqArKX4vmO+dTQeF

6AfbQN6Grnc61Ru4/tT54y+YBIQ2I42yk7FVYtLr6GEslgRC39l88nEjQ9VIEAZPvGCMhGzbRhiXdEyM

LwE+/hvFsyDDmFWi3JEum89vMBlRoL1j6/pIizF+[place]
bjjLOtTfNdlkUZ+/OHbf79+s8f90k+sJpNkvC7fdGy6CVSq38imG+YDnrTQb++GNJCSViqHAa1exRUs2

39aEyGdvnuvaNB94HrU0CDVmtsUwTh3jwi56bZmkSCANZjxCNPk6Q8L/Gr520VkH6Ad5shn2E676CFfv

aZ1AdL+fPsO0iuMszhDvbJpaMgvNP41QwWEnvrg5rM
RyIoojxiWpX8ZrAZYmQ8m1SR3yZml8x8TPc8Os+3/uprWq7Rp937KqQhN0L8DPu4hjIWa6clj3iw41Pt

Ie1zh5rHQ5hZmYXPr1kwyWhfyMfgjaQp2VEBgqGEpRxFxpFRej+UqT9r4e78+9Gby4iOh130wy7tB/3h

HGAwiQU69GHbAkS3fStKEs1+c8YPyYd/VwiMOkJZHe
D1+Ko36N+MW/0EseH2a9xq2h9N6XziUvqVhvqdvXfmibxq+p0WTxLw0BwKjdZIrzxYhqx+y0G/qJP+oD

wwML8F3QX4v1R6lb2HN3Xu0agD71Vu9xHO3TTF6qxMhKGUQGgRNAhTRodEwGzVGBFDk2ftwCBva2VpbQ

kfuG5M1GlMcx4poIAW6/KGDU2rW+WT939et47McC0o
8IE1SkPYSV3LJsoGzXpud0cboyqF1fxYMDMjOLUrQmGEVtdwi9KtIov6up3VQEm09MfMW7oX1LSa4aBV

inKtY54cJQqUlfqia1AFy+Z2SGx3DvTxwSEh2J96FsJxpctp28RtOi2GyQNmLiG3g03vdYkl9IVIlJBc

npesvue1vxoq1pbd+KwCtjAShnB8QEK2c2fMIWqjvt
T5A0qwBjEi60BPNjEsm7gRtwSRm9dRrp/ghuIY+Rcsxa5NSkuT7Sn9nyV9/MK14ejxTNdUifQzWlRefS

IC8HbSSRssNDC0dtzITFuRHcRbCM9iORiwnVYTlLMcjhsOWPaVOP4GSSX9ZBsdv36AzKk2mcD/4ew8ie

PkjsqOohTiLreJMlQCcofwR/Bpuu4EAyJkio3S8q5U
e3q0ggPJru7Z06udy9P/DcFghN0Snte2LxSNJZZyOOedqstmYos/siKi9XE1x1unYBho4SdDCrGKeE5v

QWpT0SxVs/c5GiWBbhzhmTEluySnxe4SeP90ARsDLjYqCz9FvZDigC08AkuwvjdEzKcGMqTOvaf/e7GK

XcSIXcbP2AESfzf4QXjlr4F6u/VObGAxoHNimcCvsF
Nl9eBUEAXpmSalHOwZ4N+JSTse9VyfCntL1nxkR3SoRZ6Yt/Y2MiJq2QsOA5wjdd8BKPCGEUES4QZ3d2

qOBhIejFJtfZF5X2wDPxRbY2KizdYCjXuMKWoFem9tB2drlJAZeH1tVV9jQL7JN92U6zrrKY5uB0HOJF

KWkAFd+GSmIZx7ZX+QVQg8bU9Wop6naFt33POSEc8J
ov1ZMnrxj0RXNrhCdP3kPcJbfMQx+bHp2wBWPetfrt1/LFCZztk8eArzgquj+VRWi2X/85DSyxE3GWsm

giywaNu6iyDPBxi14kGik97WW8TG6Uiioi/o/CfAHyuzWXo2u6JZMTHHe81pm3e1Ez7EmdAo0BSKkIMw

SKZ3H8P6jMtkp4mGhZxupcHmsZjVNUNu/uJjxkw2rI
1KunJ9dYgcd4+8mkD6kXvFgUCkByozzgf27hE5VKfoyIqCWdxU0ce5v4rVViYsOmhM2IWIwxOPcWA5EI

AYTp7PUbK4IltKlkf/Qpo9joHindalyo75TP55nBZgHhw7qPJLIgSCwbmwwm9nHRuf8HQB36Ix2J0LBK

GrV0vJComDeLredjYaZwGEwl1tyE+wuvTMuzbduP7x
7jCdXVG4p/GpVQQkJ1S0BhJqOg8/h8LZvkPRCqL6wK/AHHDJ37gBPVkfEm7af9JogNnDwCktHOnVePnt

rfzQvRUxeVLn06wjGZN8s8lmSiFGeQS4dQ41W0VNenAJxyt0vKs80Y353GdrbqY34nT19kdylOsXDfct

lt1k2airAj1FeA62/VviY7D+L4+cU0CRKQa10xyc/i
sipsh7kvIgtp0H2cY6b1UjBllRi7PmNwmuiuMZEYUeNEFslnbyDD07g5idyba2jGcdpyEJkToiAfOzWJ

C8dxZxeCnedrJqVbbWMLezFXOnGcjRRuD3YQkmdaOsDzcJXMYtMBChAwhPJRxPTLEqErOee3PoX3EdYY

MsVDEIAbCyB7Q8tTKgH8SgH7ZMBPAoWLFagIANa6eu
VbYkQRA1EWNhWidpFKlvUU2Bjs5fFd39JXckGRTiLpGsQXt4Qs1ZAEYgQhpnRKGZh9zyOeMuRPA8PLEf

BumuPPkrTB5LqvlfOo71UNmoECBcOaHaUEj6Nb3XQPWeSIBrNC71YCDzNTVATmAwK3GiicHpudLtPIX5

IDAgUgo+UlihjpYpZzyZTPvoOPJsVpbTETjnC2vrbl
p4dTVbYHBlPATxT9TcuFZmvoQhGpgagYQNPVLbJPFyLt2Ap0UcZCIuOyoy5SaGeooOZl1yT+z0ZIKBlz

gITO1IARGn9uqauH4Dn4lLVKIeOaYjH9E26CvLM5IrXZJnM9kvOtdmoEjcEiNs1gDtGMdhHTrzi6n/tK

/TDXu5J62GCLxvca4jCoiup844gNK5ZMecgaL+ZzFX
vHe3NczAP/qEudSo6fobz44cjGf659Aot9n4sFgGIWvw+Dz2MWWstSMBIcMDtec5dsaA7FnF0yXzcsQy

uK+8wPPu5mky26JmxhVcol4rBeXd2QIDWUxD7TPBYkKMec2N1bhz+jfsMivNT3+y4S+d/vYoXuqm1Oxg

JmnutmK8mhyDRNE0x2/x7H27B0mHwYdOG3FVBljkS1
b1MRDXivhl06x2hscfedcB22YX+YwRgyz8ka3tNkbrDf2g5pX29m57ZVd/79+bLV6zdBG5pg9tQtSYrX

YWXMFfB/PBdwtt4HbNaw0jVHG3D5Jp76iv0pSRjeagUPLn8xwQhIlPytgT7IpJs33gC6Wvla6N0pa3oC

o9lo2zscJ5G4YTwphgR4MV8wf1UUWLbCxkIhzATrIB
xxDYGiaMOZhstNENsGj5ESuLBofCrNb7yzpL2CbVhG0K6pLPqRwYxpXJe1CWwcEvWFb5G89wXXSOcu3k

vjGvXz29ZJt0ajp84rq++pUzv759e75UffnTczbwYdVMnWQMQM6sCyTQV+bFei2viGcKrxxB5h2W4eFR

qkDK8hymqYwSzK9Ji4e9F7y+/fBNPadga4kgiLbQpO
icBnHk9ozrcr6sM6EPcg3+r06kGtGhLP7FLIq3CFDlG87wGXOEWqyLb5YZObH9GgJVfXMmIPuTm0LWsz

pgLlRiKTlKUnbXlA7U8Nwoj0IyTCjWyGDPy11mZaKYGwf6A3aSWdhijKjmEBWESQ5zrO1+frFCGcXX8y

Hh0bW2V0fk6sw0d8EFqr1fTool0PQw0SSMrgQl9t3h
Jx29gka5ht40okdxb0xA9zLIvb4P/+bn/xHcA1ybiw496rGL2qHustOC0kNFqIzyLBNPb4XbsqhITD5x

7Sj1PMNIFK5NvLo2gySSGJQyovrxHu9JTvFdr6DkYllBaQRifS2RJiLu4bMOuJJkoxB58DS9btv5F4S+

CwbfUBgpqtsGh3kpYMlQRUdJhzyfjEgKsv+H3X9jLE
tDXuViUYNOwHbRpeTjolFY8mR4lzNkTonXaAtVxTjIvft2yy3AA+NADWXS1ob0DVpczGfyWeKYA6SeYk

A4JsSyjd2ShJIYmIHkKWwU0Y3RLgG8eo7VmFICN5RSwsgSHSoGLxMdCRaYsRqWLPVGqWmVY6mOsFb0XJ

9V2CO4KaAGXRNckCiom07YHG4EI1MVLEBuAbdPQqU9
EsIEtL8zlX1aCbITKR6zq3MNCy79k+J7k0FXH41tsD6S3vTTubneLCMFMoKMosS4l80quRbgtDNUjMlP

I0P7oIjOUPZyrPar0tBDs1VLSWzIc7nRp/cNdY0TIDIW4Q0SPRSWL3GIkclHHwO1DyFReuDmQZH0jGRy

pciMpXY3kbDSnK4RshC8XxMPPXMa+Y6SResycjStYX
ibjOhJziDXN5edHFnvHQDvEXYOiNoTOGoAv72Z+UK8DN0CVA8mJ1tVNcYBK+NcPthJnlS0QRnNBNNBD6

TK/trYRzKrMZq8gARFTU9xqToAKLy2rSc3r6GpLBIuHsrMznLUUtVzGkbM7nrg5a3558ZTZeCwfCZHSt

PhCXdUEqa6sEOm3tfhToExppU1CEJd08SQpi8I3mK2
eQaYaG1EKO7qD0XU6CitFe9rDBTqLrMshAOUWm6GmUik8tGXSR05nxQxpb0EPjSlNB0LY4LHPSugC8K7

M48mh/mJ9lhR4AmPKpGt8qjmUg1xwLYeDQmYALF/jxlCAycdMRDa/OvcO4W4LJyKbjF+kkHO4AfUZFfS

NyQrl/iMTqy1/2sK8bAnzNswciC42UOMQVH6GgbXW2
QdAJ4Bu8Gq+gH63nPMZsXZK0oqZ4KHjtecXRtGF1R0Hdg9zduewLdNJawAgY70j4YqNkEBCcVMZ8q2gD

UWmqRTFtX6ii4pu7dv2GdEOHPkv94/S5BbRQkzu/YuZe/hmG7CErt4v8B+V2S3sl3/4c13T4j4aKg6vD

fpeT57AfOBZruEWwit/C5BLtZCqb/Y0J7XrpKhGZkz
oWZbEip9aYtr3iweJPRxXIv6ZXfyEe260XYsX4iEc8y8UJMoVVY8Adj67HGxmoW1zwS4rpR+89/1Ihrq

QCVgEm71cmdUcpl7tVTdH9L5rOKV1tfHvp9U1wKAYCPAyj1gcdI8d+3R8at0o477bdWMJNSa1tGsTvsN

5e9KQVy7fphzuV8dW14e/ahTt3b+S29tnrHgDpCHs3
+h0iqGFvU0vnospl2pL1LthilKnUIRUokgM0321q1UqS0kpK09yBnyjMmytRmx/adRkeXoDjTllNNFnk

OvM7Ep6D2+8naZwisV9bjOgixuW+aGl7EjWibjmt52n7zHigun2BksR0ZIKv+yFGdOgj2B7J6GMZ9UAR

/z/y/Gx8ftzoPVnp+IWol7o/l/TZM/fkfB+M4YVLQt
NB8LV+7iDCgGyi4n5dkxZK1OAstxgT6R8fbDS10DmLMKFY9OnqsEQIJL0shTZcGU6K6RwkW/hAmug/Nl

u7m+M+FgTNA+hnfSR5Ijb6EoUS881vuLdqXgEzU/gZR61sVz9F00CI5qfw06O5hpXAmF3Oo8p9I/OiI4

1aGky9MDQ2fWrQaKhvuoyAWUIuadmNvAc3xUbtm+pr
S8E+KuGXJ90luNhVwuKJpu7exFGaw2nGPjiEg+KcWsCl7RuGIED3yHZ9bz/5UZ3Rbw1DjICyrRK17I2G

4ujqRYY1ytItV/gKyy6RlItH9OhfTay/A968R19rfzhUxwoFaWK6XlT7Fr5ZKmA7KnxQFHqL4ohUve85

eMvw3wodcaniE+bUc12Yow6l7jwTpDMvKvDFM6kuXv
gFcyouKySdhHAsBdOHNiMjfGNeDbFlqhtcEuYgbGRDoxXUGcFyyKLMxFOTYqNiJvd3NxM3GkCQLwALBJ

SlUmA4F9hJHyV9ZuU5TEFBNdPMNunAPOk3suTqUcHQA0BYDnHrrwHVvnAJ8Rvw6xAj10VEzqGEJoJfLg

CQb5Av2JNUNmOthpTBHSz6sdFcOzOIY6DPPtHezwLT
quNP8WhgbpNe58CPhtYYXqGfPuRZh0Qa3NJCZdIHXtRP29YJXyZOFNMtXsH6WwebJrwkZsDGA1DIKkTe

o+KjnbbsZrOwoQQnBiHXDmMjiZPVtdV0lfalt7tJQeLlUvGHTxY6ZdgGXgadPaJawhbWTQXMPmJQTeDn

0Us9XvPKSrExxy9QnUb+BOVw7hU4KKZmcZakd2g16C
VgxBiZ6a5kIUiVBBasyuNgZDpM/b963BIgLEckuh1+GTCrtSWUm80W6YwUoTNIWadb5hy3LmBku5xDNX

U7uXyZuCsLx9z85L9J+dfByYsS0GypRFSTYGbBG1lmTIHAKUD6QO7Iek2rYwVhUMzRHaYCXklzEpSWao

Tk93J7hR2R6G/cx3Zsmc388yK6YCaO8Sjd5qtfoue1
v1Gw3/R+Bzj5dUEkmCFihDkwOMJTDnHHmG5T//uT4EH1zC//oNjX+yqbz6FTGpUFXiaSQTUSioIgXN4M

s9nwNgWyHpRcnJYLBkmPeFbMKvjJePOJ26F4wZJ5lCUtmz53+NN3wi9o8k/NZtBjb9JHyK4Lrhazz7UJ

LglO3ovVIhRpJY6knVhzLhkKAphcbm1a64wILNOlK8
wUsbRWPHj0odrVkOJlL6b1cZM9Q205I3RKoPlP4fdq4+dRm5Oc/Lq+sM2tbS7EXhdif+wXbvgJlzLIQT

uAB69y7x4TW2/IWcO1T3pSsQQ7Tr72dRZvqzkpF4CJbDqwULJSJBAFB1uEZwMUOySHG+mDE4BEwMKG/D

+YnS3zD1qAZlWCRsRaC0jlgym8RpoAej2Vgm5am1y5
CszjWXd5iuLV2wZUXFIlsSBXReriDhqXgOt+TgUed2CKBQq+NaEy43Kg5umK2wIfsdYQcICztcMx1g1d

Z+/M4ZfuP76eORZmSLToTvbGFnP02Nchs4fn42pDpK6WsRq2ByCs3sIVwstE2LTZaXXlgHbY2Rg62JnR

lqYwT20k4Yr4rQsX7ohYhDjEmKrbC2/2eFt1Ocfbwk
Aj70n+TjSn17JeYD0G91zQUxXtVvHVchW1vjXS2VO9qV5GrUaASaZCIWO57Q5kIVWLd4Bw0U9ubF0Due

/0mdNa9Q/rsksxn7KfbzPZqADYQHcooSjXB9rpNTYsYKV2qbaTvLSIlv8BWpQfyrj1YsnNRHDxc3OH/S

QjgX7laO6DqXt8e22OhVPrI4RQDIHMNUuB1ZyP6UGC
prKiUxM+r/sfA/jxWeASMziCWR7l76FobK4fN6OWQfuu3R6sh/ZKSbEy4hL0uE9ZglUR++cG72UUAb6E

KsIQc5eYTcQqvB9vBtf5eW/bTMd0OuVJfMmqXi+NHGYG/3GNcgx3JqRtTmulzCNSL6wvCG5xn6G3IZz6

N0ET4l+J2DbbhWbUem4w0NfNMzg+sFqDJbdh/UOKMw
Xs7RY+pP3yZ+Gk5ycm7zWDytgTjJIViBn9MxrcaBrQ0fkw5uT4KPntQ4ra4ysUqDalMsrQ9AoqcKtv6z

S7C3MbA2/Hqlzrv3Vv2wI9RpXlrQ+ELJUfueTWVM2SotI3/AlEO7Ohnns+eLe9CsMw4zJ1GigKGfkVSU

8nuQFrGFtkSrypL/gq4Mkmx82KaNfWTRCoNteG/TQv
GACZEUslzIdCMH7URQDo91gUs+HTR6RS6mJi8h3K+Whm3IvGlUjtJci/i5OkNxJauZJJTK8TgSXPXkIA

lJLywXn4NOeehSNuJQHQ/hK321AM7VkxDibXb+QvuMCCaG/tF720j/JqpDvsMthn1GgB5ph2B5u+d1Vv

pArc+h6DKVKcsd9s+IspxDFRnBZbWJ51Vw14BERNYL
unpDtgNFEJL5w/VvDMm7w4EqKSoURPjFeYfmlpr1RZeduI7u8Wp5j+NbuMl2KfKIP8Y7mpnpDZpB3fcR

WF9Qi6yT0G6lfC4fTwqvi+3pz9IpApck9V7W/kkoQOSM3Fo4+8ewoNxiN1Km3cBzeZnufYfYMBat0az3

v6C1VYIHS3mqf6fzdr45xs0aZUVtq41/S5SEC3r/hh
gxFJurK5jer+7Tdvz8Zpj3wk0kMT/oa5nrUDGZ8lnIphFFRrWWzFRrqOW+oUYIiAeF05hcD1oV8shKvv

p3z2IE018urR7vexStg3kJHfbx55bpIgOuJD2ExuCBkBRAlPgXzHWiQxTcB012hihu2wiJBv33M0Fz2l

l1Mu9V8pJsX0J+3eNMEQUMRIEgXCnCDpzHBfI2Vc0l
EYx+fWfbke9y1JeOypx18VAHJL/znbaBKrZOTMqkC1rZ/UMluK6HatB8Pp+CwJn1VPXnLQQrb4504T46

MBBk588CKlrxFNHl6ZCwfqNDtO+TKIDGbCtvw3+BH2AhH7pJER76KWpTRIDKHukiYhQc/IO1WljSn3PI

ayS2GkrS1KqhHvyhJRadnntskJsf/ubmnLqhIeVQx4
2E5ZnzJxyRImvEX6+ha2LAvJJMugHxnEKbSRdWuXGYLmLr1z1X1KJopWI5loNyd8LWqSzI4kkIoHk4qP

iWHEDXItSc03n0KDC7Lfg0JlXnnfEk3rI+qT0M4za86mF8Sub9uWQFaJvD53IU9isi1TuyPfZ+bVR2ie

m3y5acSEkijhr/iUuYPlSpU/bSF22Omsg0rHM5rO7C
nfh9zjradBwz5KW0Y5qrWt/McpquuC922R+X/geMr76i9+b7NV/3bYvCCnfQjwGta/x5Pj/DMGdYblI4

xpZTo2zRyIpeWj9lh22J409BvTQJSgqF9hVoeBoFpxaWAMyR23W7nj5/xZ4aCO1us0L0kf1lWWjTCJrS

pxgffu86LA7SMQX9ZRO7jX6nFjMQd4ChmsWK7qzAbv
0FQ/IY343po3NOPfrOQqiQdH732cy/nLqJwvnDaD56ZJDOFv5gYjk5qHSiJjVzLpqrUOySuYx1ztKMlz

Jiezc8W5NQkXs0LK4RxoSnH/F25qmb5qPP1zaDakMJ1rm8l1tG/y7qwCfFxHPK+JITrLC4xEXO6bfFdw

BpB/0G9doncC1RmvYGgVQzyXUCUmPZmAVbmtNzS8I6
OHGpge5SJb2VgKQou0y7csSBYY9qr6ElZIZqPsGiWG8hghmhWtAeLB8pnhozGEUhEwOdJC3rwlykPMYt

HI7nmku9JQlyWI8LQRWtwQBnPHHrPgWgLJUTYCDyFDxgPHYwCRIaHPgcQA5YWJOjHVWxyRRjYJMzFFFp

YwD4ECNvQxLzW1Xof7KVc2viPeNcMQH2MZGdGqkpYI
teHW3EwJKfPLBzeULyGGDhSTPgTyF7DPLpEqVeA0DgjY2Wm2viMlHpJVY8FUFmUvruACspNC0UUDZclq

IzMHWwYFFSEJKcL83ocFIkqJDkZmXkVHBRMf6+WlGnEV1uhaciWWAwAF7afim8APGpOXCjC0WcXBB4UM

EcGkSgJdtsbKMfTW2RfJO5LIQxW72rLJT+PgpzdHJl
IW2FuEzwW61eEdkLd3fzzIc2AURkDi8gZ57JbVIlQxCAhKgFDVqwGR5KMN5U77rBet7jVLZBpxdQaOXr

kX35ENWN10rie5k0K/amJoIKXsbiW9EgTbbHpgcTMTH7z9PVCFRP53nmfDlbSacjWFpN7yuG2LECH2lV

GkKMsYGxrMV9LeoQ3TQ14WzcZ3F0TVlVwCSRs9HtzA
oKjn2tL5xm7cD6zt0k+mwd2hHaTaBHwptMbT5Mmrhy9/2O0UqSo3G+k15sJNA3SJWnuMI7LYUKtRyxT7

a/fh+IyLZU7s9+R/2fz4L+4IARZtgm87vjqEDOgN2SjZRT6Se8+chvggPsAar6jvIOnyQD4i2i35VLA+

43iLYpEUM861ZKKD0y9NN/4EPp77gzMiG5pEJKaV7H
qjKpNqa1wjeE05WkwfXK14WymEuYfFUtuQ9T+/DRN4BccfSUbHYdbZIGShWtP7qP0J9Q5Rw96juR8U+c

+4CR73v+uvS4657ihudldx5xqdnMtrYble3O6xENfoNiCzX2D8sDy39db1WxNwnIeMGkD7oweRVvPXjE

VzZjmrXgYj8W8BltT4FUZdkmBPSsrjMbXyKPX0+HaY
k4kxgtgpfOgMF8e5EushLijCSmFvvnVbouOwfm/UOBCwbkNkDyB5QD8eOjmhWM8AkmuRPRZKXSYJKCVG

duYuHYCSuN14+4SI/RaIsdfsWLf5/Qh9NWITzF1rN1eoBF8Ohk5Fc12xzE92HiRWG/ijG1AavXNQfw8b

6Wq+WEIrHgDVcsWppjUp0He2nT4VarNJQRoE5IgVGp
8+Sv3DuHNRG1OCOVqhRXHiEjKmsjisbRJUjkD8VxNpTVMfFsobFzJDPyvg5gQOtYw5VVhQJd5oyih/SE

3DmhR2pcdugdKTdXphjO6IYMCljDuIHTBtntfgElF6v12uq1qLbGtdkiIaRzWkiWKBStAKWm24N3geDx

LT0DA55alzo51yyKZqX291ti3wOBaax70hJmnAIBHt
YziJbrWJaZjfozolBqQOrfSohZFK8SRwtzKEaC+TRx+ZhtZD5vTxIAFqxcC1aam0nt71iO6cYjHGY2Io

wxqxoVrHLgYU7hjFwDRGcmPTwQddpkieOY3EkelPnoCbsrKfcGNOvDyc8Ksx5fbG+fR2tYX9Ly9bE4/A

0pHkrJdsp6wNDIzxKjVzWIAMMrMNvJh7ERgpLFSd6x
jWFJNIZ12VpybAVmCTqoo+T1QT4qkqnFnZnMT6cN9r5xtv5UHCBksau78WnfMNQN8MMTNFoRgGKwV6+/

/ABtTrwkhv7F0UdUO5PFViEjblA8QpYH6rfbP6stnK4L7AwwZaQllsGTqQDHB6xCU1ZTlWQKORH5BnRi

vwQmhXBvBKmD3jMruj3nOpzLexq4NoMJqpWAxAtX6W
KeKdy05OUSsLiJoZ+CtnfK2Xy6HPpMNWVQRH+u7ShvUzMzFrv/VBM/HL2MbdGIMoyXOyMdeQCeG1nO13

uaWJbzWVNQ1WFAV/cwtsNTV9Z8ker+AAckIzCpFnR3/gzOaytlwluRcYt+eQKl9OhYBK9Ij509KhdF/Z

ahberKskFPeeakGP8R6RTUKlYaKhRqKjMqLuaAtNBw
df0grobxpkB3y+e6nKdkyYkspfBBOG7FZnhdZUDfOPyFJI6WnXGf6RaiyyRhmh4feinp0Uajws/C8O7c

nuvbjdEVejQai3F+CCnc29U7MoahnOZ/5pPBLPkajQDtP+KBybLOUZqh+MyDDMzRf7N6sh5WKqliqTeU

0yxRLpBCfl0pS0veJQ+jODYF8+KXSZSDLqWSISLUt/
x296F/p8XMw6IDXfQVt68h58FXkBBBEmLUbl20R9FPbpbfuzm0AnqD0M6TyIqC31cA2HyuqALxe8mFiE

oU4vy7ZhaPXsd6FfYnInhFEXh4+OwG7qup7DnhFAZ+qmjnvtveYZ7IrotdAcKgH4mxtPu5+xlaes0aQ9

WWqpvedhFjAag5Ybc2bsTzJya00sAQ/wjnCYPefSts
1HtbB8ojB+ItyoEZSV6hj+hySYODWHmfark8gv3V6T6gtyxx7rGfTKPT2hixTU9Yw0XVBwYsxDD049g7

h1BHyrrJ46bZl95GgVzKcszWC3740TldKT0O4EZLymdST4xVcVsdkACmLS4Qw+4XF3R40q9xseEWvEPK

wQ7pDtobIjiANnHznf8Di63piPBG8X5OI3N5oC/0mM
8r4PLdQD1acGAfyL1vEjGdRvTxr+gQPwdJj4wqOdVHD5JDqfMRsqYpzElXXsdOepKCVXObpqEP2q71Av

tpWZQ/G6BYWos0sHgS82u68tN386t/gcfBuYv+f2x7soFXb0KmgpPHQ5zr59JtWZo04JzYKUVyZCkTgD

m9ZX1ZL4fgWCr3oou3W1/R1DR+ckR/eSyx2VkkCinN
q6W2r0FEiFQBFWABHxP7gkGNpPDwPN9MuuaSf3EqUbvZJDAqu3z3qNUZH+5m0p6MhKDMO0J4XEtXzfea

Zd4N0JOu/dlI36ESjgAYxTvaP+SFiNBuFJFTVhIr3FvimJtxUtaRhQ9st4a/FRkJ5X1pASvmfyNdMi1F

9WBi2k0NkKoUT6hHZv4d6H0KvtTCunPAU3ze4zniWh
myr3CKyTtl4CMO5XyTHRkW8jqFYoqhMddm+MuYSaW4HiJW9bX6lydaxBKVeWn8L0ScRuA+Fqa3bx+Ije

laUJV+VDCk5qGQIqXGcamVCiV8rIwa6rAmjVxsHhVwG9NK6HU5MvMHJHArNTDNELG6Np5VB8aiXAlhFg

F4gdtYXOenTOdMoI3Rh0M1PPw68eBx0qKzGb3mtqSc
yotWvIEvWik0PvXQTGFVSa51JRshDjzljkZlx3c3qEyFPQgO6UzoE/BmGHgdbRYTqBzIl8J4HAMAOTTL

63T/hYy/4wVaWQ0cUsTd4rDcVqUtUCL0o3E31rpTCz2y7N169o0DwhSnjaNw6CyovkmBSWXydAbYu/H3

gATxgEnfkfX4M8L8krALknIO/rTe3XW8TMM0TJE6x9
AeakFBGrqgzMgi3uLGdctwuLMevsYhnZ3qF0THmxgAThbBXpgdUZSr3vzXYT5PDYofxvuFWGFF9W6GO6

OsASTf5kRzygIOyg2n75PdCwxnmB+WgiKQtg2RMZOuwBLXjbeFvH3IZDAmhzei/NAGnJxvxuki9Dgp/B

Cjwd9BJ/CIxIq3+JjlneBkI3fIWOhBXjX0DDO71Amz
C3Bz2H4PmI228jdJxyL0wKmt20HtqQ4P0xX5WGSuYpXug+8o9sEL7Z3vZkXDNZ8KDC4MqD5tAE3S1DfC

vRU01POCarTEuld+snUjZkkMTVFscKen6k4Ojj2h3V1bUqVjaIfSQ6i89fN80SIGDh3nMELY3u3H/qyW

1H+6SCxRKx8eJaE3ePRIq/+HOVxakP40vk1L3P2eoT
1NreJqsmGrR2H+N8X5VVL0PHw0XhUtp1BOyyoj+q28F6wO39S17+6OKwaB5QvqZUCNi21418yMk1bv9O

dcFajOt4kkaRZiY0EnVkVBs2EjMm3VPa017978O6xVYwRdP65CmcvyS6KmIjZ3V27yvNwbj3vgK/m+m7

Hlb12JNk36GBjZfynZxBedTzl3hAD5fu8IKxXKjO+X
Jnu+fKJTOul62ryH/X/65bgeV5k7oq2WfXyJpnYHwki4b64xdrhXs4CsqX8cZR+HrA2xsF2OAx8p6tKG

KmIahwc9OFaFI/ZTDt9g52uo/s6azscMO2GYFR8ju0CsLLPmAxBfXT1ordrgAyLxQI5etpsjIQDaQeFe

WH0xkgitYEDbNL6qxkg9DHrfKJ9EXWGmjJAmGOCnAm
EcOZQZSUXbFXgeEMHeUGIiDAbdQB5UZTJgXOOknKSmUUVaFZXhWnK7OUFzFxRsY9Oqj1MHb1bwSbQrBO

C4IZLaBhslNLdeBO4XoMWtTWHpnJLlZCThFNXoWiU5TOHxOpFrP4PygY7Oe5vzPnIvUAA0JJUzFgnmCL

jmUU6NGKAgqkXoXWZoSXMJPYGpG55yiWOkmIJsMcJp
MCBSCj4+LwJkEC0aqrrhVoZpMK7wmzy0TLgbJQ7FoEMcKI3Yo992IlNrH8Z7EoN6kRHjQ6L7vBJbTjJf

P1Tsc9YHp951QI4ThX1ump2Ff3coCoSwY2RcP25ivU7xVY3AmU8EskWaLN5wx8KkkpjLSt8CUB5ko0Bk

IcS2HUQyy2ZqJvf6DmPgK0P5hCCvT5BzowYGPBNxI8
OpaDnkFOSfTCtxIIIPVYLhWsNvHMRhltOkF5bxeUNdqTigAL6Be9jrBtQgI9BrM15usT0iRM5WeD2Hee

GqVY8rf8GderlSMk4VNM5th6RaKyY0OCJeq5CyXtj1EmZmJ4L0iEGsV6FeivPDWEQyR3DsyPwcJPDmYV

gwYQJAPREsReNfJMTsxfSuO9DkoJJmPAZcqBZdRvFc
J0JtK90koU7bKP4PzR6KdfArFR9wz8FvrqzUTi1MQJ8ki4HpSiOiTBBiTgdNOSagQ1S2zDVgU9AaS1Vg

Td2Hx4ZirHS4Ma1QcHUlVFivYUEyYBZrReQrQJNOCKB3CTXeQpHeUDEyVFUpFXW+PgplbmRvYmoKMiAw

LH6qpey2GAidRV4SgZRcNK0SJPLjpD6aTqLnL2UuA2
SbNUIjBNNYXhSgN8kktxqhUBtkiW9xrg60ktyDDXAlJYP4XTLtcYTnAFDlQUUBKNJbMFWsUKzyWzGnlj

YrXSFwBWWQCb2OHO3zl5LxGxUoNBMmBvsKGPzGXKEhBg9brML0SBamIB6DXmItHwRgQLMFOUBzBoIbWi

slRIALWcBuV8V4SMO8LLSgPiv+IbudKE7Ggn3tV1G4
JNiuJOBKSO2VaRTrZVObX7mbRXweSdWhILO2nV4LBCTeTG3vdmXgiIY3FBJyAR3rHUTcPHYtDnBoVN9i

QVG8PUMcZaO+WqxqRC4Yk4pndgOwDSFfUXx3EY9TSYQfbNt7ZpgKPWglCMPAEE1+Cj4+PcKwKM6sepkn

JKPsMK7kcaj0LZScKuRamhDsGUJ0TIAmTfLwQKR+Pi
XeRCb7MN0MTX9GLK9LwDO3FU1+XSA+WubopjIqAodEuOAzZqnnGKAjMfOmNKLdHHTvDCLbNrO4BzEoKk

FPYCIsCCImYRC7TzKyWTAiKMEqIQwpUVWwSVJhIjI0ZVAaHSHxWL3gDfDgAMVcRkE4WdveTRZhCWQdqk

AKMDAwMDAwMDAxNSAwMDAwMCBuIAowMDAwMDAwMTQ1
KWLyMCNdAR0rSaBoETPhMBQ4TFYqDBQwNBTvsjPSARJdKNFgWedoQcGlLEAhUXBuSAacHTBiULAlVwP4

HFYtKTRaRU2rMqNoDAYbVJT8AQzlCCPfGCHvwxCXSDEeODIhNbV6NSQfHMWzKVQhHZtbJFGfMSNgXdUz

EJUgEXKsTD3uOrKaVVJfAHV0UzjbPVEyZUQuxyWGQN
JcVNZjElu6SANlDLUqCEDjZGnjXTQuEIQiRAk7WOEsOJLzFU4zZpAjKBAmStYpXBxgROTeNRQfxiLBDK

KdJGCuIMO3ZVArXGZiNKVgQMwvLIKqNOPyUjO4UNQiSFMtQK4lBbNlOXUmBqQmLtzwPULxWRMnvhJKBZ

KqDVMkRji2CEJpZYErCRCpOSlfNIXcZJH5EWP5OHFc
TSHeZZ7aUpWoOGBvGnj0SwpoSRMxUHUoxtTCMLFsHMLmJGM1ZHUcPUPsEESeQMrrLDKfRKV3NfX0JEDd

NNMoXE9sVfWeDXHsDxK7IbcoEERmGVXafnYCINLiDNAyNgqxAOLfDMWhIOSqSPygYVUrIEDnJzH6WSDe

GUYhYA3iHeLrRIFmPfX2NwLaLRWmNGUflkPXOPSrUH
FuKFUvKnEeASErDNKkIZguXDWkUTQzOYW2OEJmXPJfXY5oCjTiQOitXKUFMMtARWLpVd9clHUuLMLiCp

xfPK8BgoFiDYHvFMCYQvMmY6wIPUi0IvTtWaDPVhQrMYfUUbQAYYBuZrPDVAMSBFHBBFSuUtL+IDwzNT

WnOJEEZPZ1G0SWLUTbXWRQC3NJM2K9VjR3FHLkEE0o
IaTcO3AkiaFxDtWFGi9Ei8NgbkJ3ryKeHxCjMhw7KkMzHR4XVf==









                    ID                  Date                Data Source

 

                    [id number]    2020 12:33:00 PM EST [facility]









          Name      Value     Range     Interpretation Code Description Data Josefina

rce(s) Supporting 

Document(s)

 

          SARS-CoV-2                                         [facility]     

 

                                        This lab was ordered by [facility] and reported by [facility]. 









                    ID                  Date                Data Source

 

                    [id number]     2020 12:33:00 PM EST [facility]









          Name      Value     Range     Interpretation Code Description Data Josefina

rce(s) Supporting 

Document(s)

 

          RESP PROFILE RP2.1 NASAL PCR                                         C

[facility]  

 

                                                 \BLDo\RESPIRATORY PROFILE NASAL

 PHARYNGEAL BY PCR\BLDx\           

\BLDo\DETECTED             _NONE_________________________\BLDx\          
20.1608.[facility].           \BLDo\EQUIVOCAL            
_NONE_________________________\BLDx\          20.1608.[facility].                
        VIRUSES 

 

          ADENOVIRUS NOT DETECTED NORMAL: NOT DETECTED                     [facility]  

 

          CORONAVIRUS 229E NOT DETECTED NORMAL: NOT DETECTED                    

 [facility]  

 

          CORONAVIRUS HKU1 NOT DETECTED NORMAL: NOT DETECTED                    

 [facility]  

 

          CORONAVIRUS NL63 NOT DETECTED NORMAL: NOT DETECTED                    

 [facility]  

 

          CORONAVIRUS OC43 NOT DETECTED NORMAL: NOT DETECTED                    

 [facility]  

 

          05525-2   NOT DETECTED NORMAL: NOT DETECTED                     [facility]  

 

                                                    REPORT TO DEPARTMENT OF HEAL

TH 

 

          HUMAN METAPNEUMO NOT DETECTED NORMAL: NOT DETECTED                    

 [facility]  

 

          HUMAN RHINO/ENTERO NOT DETECTED NORMAL: NOT DETECTED                  

   [facility]  

 

                                        NOT DETECTEDNOT DETECTEDNOT DETECTEDNOT 

DETECTED 

 

          PARAINFLUENZA V3 NOT DETECTED NORMAL: NOT DETECTED                    

 [facility]  

 

                                        NOT DETECTED 

 

          RSV       NOT DETECTED NORMAL: NOT DETECTED                     [facility]  

 

                                                                 BACTERIANOT DET

ECTEDNOT DETECTEDNOT DETECTEDNOT 

DETECTED           TESTING PERFORMED USING THE [employer] RP2.1 
MULTIPLEXED           NUCLEIC ACID TEST. THIS TEST HAS NOT BEEN FDA CLEARED OR 
APPROVED;           THIS TEST HAS BEEN AUTHORIZED BY FDA UNDER AN EUA FOR USE BY
          AUTHORIZED LABORATORIES; THIS TEST HAS BEEN AUTHORIZED ONLY FOR THE   
       DETECTION AND DIFFERENTATION OF NUCLEI ACID OF SARS-CoV-2 FROM           
MULTIPLE RESPIRATORY VIRAL AND BACTERIAL ORGANIMS; AND THIS TEST           IS 
ONLY AUTHORIZED FOR THE DURATION OF THE DECLARATION THAT           CIRCUMSTANCES
EXIST JUSTIFYING THE AUTHORIZATION OF EMERGENCY USE OF           IN VITRO 
DIAGNOSTIC TESTS FOR THE DETECTION AND/OR DIAGNOSIS OF           COVID-19 UNDER 
SECTION 564(b)(1) OF THE ACT, 21 U.S.C. 360bbb-3(b)           (1), UNLESS THE 
AUTHORIZATION IS TERMINATED OR REVOKED SOONER. 









                    ID                  Date                Data Source

 

                    [id number]         10/28/2020 02:54:00 PM EDT [facility] ([facility])









          Name      Value     Range     Interpretation Code Description Data Josefina

rce(s) Supporting 

Document(s)

 

           Red Blood Count 4.46 10    4.30-6.10  Normal (applies to non-numeric 

results)            

[facility] ([facility])  

 

           White Blood Count 6.5 10     4.0-10.0   Normal (applies to non-numeri

c results)            

[facility] ([facility])  

 

           Hematocrit 41.4 %     42.0-52.0  Below low normal            [facility] (

[facility])                            

 

           Hemoglobin 12.9 g/dL  13.5-17.5  Below low normal            [facility] (

[facility])                            

 

                Mean Corpuscular Volume 92.8 fl         80.0-96.0       Normal (

applies to non-numeric 

results)                                [facility] ([facility]) 

 

 

           Mean Corpuscular HGB Conc 31.2 g/dL  32.0-36.5  Below low normal     

       [facility] 

([facility])         

 

                Mean Corpuscular Hemoglobin 28.9 pg         27.0-33.0       Norm

al (applies to non-numeric 

results)                                [facility] ([facility]) 

 

 

                Platelet Count, Automated 269 10          150-450         Normal

 (applies to non-numeric results)

                                        [facility] 

 

 

           Neutrophils % 46.6 %     36.0-66.0  Normal (applies to non-numeric re

sults)            [facility])         

 

                Red Cell Distribution Width 13.5 %          11.5-14.5       Norm

al (applies to non-numeric 

results)                                [facility]) 

 

 

           Mono %     7.7 %      0.0-5.0    Above high normal            [facility])

                                         

 

           Lymph %    43.5 %     24.0-44.0  Normal (applies to non-numeric resul

ts)            [facility] 

([facility])         

 

           Eos %      1.4 %      0.0-3.0    Normal (applies to non-numeric resul

ts)            [facility] ([facility])                    

 

           Immature Granulocyte % 0.3 %      0-3.0      Normal (applies to non-n

umeric results)            

[facility] ([facility])  

 

           Baso %     0.5 %      0.0-1.0    Normal (applies to non-numeric resul

ts)            [facility] ([facility])                    

 

           Lymph #    2.8 10     1.5-5.0    Normal (applies to non-numeric resul

ts)            [facility] 

([facility])         

 

           Nucleated Red Blood Cell % 0.0 %      0-0        Normal (applies to n

on-numeric results)            

[facility] ([facility])  

 

           Neutrophils # 3.0 10     1.5-8.5    Normal (applies to non-numeric re

sults)            [facility] 

([facility])         

 

           Baso #     0.0 10     0.0-0.2    Normal (applies to non-numeric resul

ts)            [facility] 

([facility])         

 

           Mono #     0.5 10     0.0-0.8    Normal (applies to non-numeric resul

ts)            [facility] 

([facility])         

 

           Eos #      0.1 10     0.0-0.5    Normal (applies to non-numeric resul

ts)            [facility] ([facility])                    









                    ID                  Date                Data Source

 

                    [id number]         10/28/2020 02:54:00 [facility]









          Name      Value     Range     Interpretation Code Description Data Josefina

rce(s) Supporting 

Document(s)

 

                Class Description Laboratory test result                 Normal 

(applies to non-numeric 

results)                                [facility] ([facility]) 

 

 

                                        <content>.</content><br/><content>Levels

 of Specific IgE       Class  

Description of Class</content><br/><content>---------------------------  -----  
--------------------</content><br/><content>< 0.10         0         
Negative</content><br/><content>0.10 -    0.31         0/I       
Equivocal/Low</content><br/><content>0.32 -    0.55         I         
Low</content><br/><content>0.56 -    1.40         II        
Moderate</content><br/><content>1.41 -    3.90         III       
High</content><br/><content>3.91 -   19.00         IV        Very 
High</content><br/><content>19.01 -  100.00         V         Very 
High</content><br/><content>>100.00         VI        Very 
High</content><br/><content></content> 

 

                X872-OyE D pteronyssinus Laboratory test result                 

Normal (applies to non-numeric

results)                                [facility] ([facility], ) 

 

 

                R176-LvM D farinae Mite Laboratory test result                 N

ormal (applies to non-numeric 

results)                                [facility] ([facility] ) 

 

 

             C658-XnB Cat Epith/Dander 0.74 kU/L                 Abnormal (appli

es to non-numeric results) 

                          [facility] ([facility], )  

 

           H202-FfL Dog Dander 1.35 kU/L             Abnormal (applies to non-nu

meric results)            

[facility] ([facility], )  

 

                T578-KfR [place] Grass Laboratory test result                 No

rmal (applies to non-numeric 

results)                                [facility] ([facility] ) 

 

 

                E522-MgT [place] Laboratory test result              

   Normal (applies to non-

numeric results)                        [facility] ([facility] ) 

 

 

                Q083-GfO Penicillium chrysogen Laboratory test result           

      Normal (applies to non-

numeric results)                        [facility] ([facility] ) 

 

 

                Q028-QpU Cockroach, [employer] Laboratory test result             

    Normal (applies to non-

numeric results)                        [facility] ([facility] ) 

 

 

                B789-DbF [place] Grass Laboratory test result                 Norm

al (applies to non-numeric 

results)                                [facility] ([facility], ) 

 

 

                S821-VzJ Mucor racemosus Laboratory test result                 

Normal (applies to non-numeric

results)                                [facility] ([facility] ) 

 

 

                M003 IgE Aspergillus fumigatu Laboratory test result            

     Normal (applies to non-

numeric results)                        [facility] ([facility] ) 

 

 

                M002 IgE Cladosporium herbaru Laboratory test result            

     Normal (applies to non-

numeric results)                        [facility] ([facility] ) 

 

 

                E783-SiS Alternaria alternata Laboratory test result            

     Normal (applies to non-

numeric results)                        [facility] ([facility] ) 

 

 

                O341-NhQ Stemphylium Herbarum Laboratory test result            

     Normal (applies to non-

numeric results)                        [facility] ([facility], ) 

 

 

                J795-EiF [place], American Laboratory test result                 No

rmal (applies to non-numeric 

results)                                [facility] ([facility], ) 

 

 

                S559-PiK Common Silver Birch Laboratory test result             

    Normal (applies to non-

numeric results)                        [facility] ([facility], ) 

 

 

                N193-AmD [place], White Laboratory test result                 [place]

l (applies to non-numeric 

results)                                [facility] ([facility], ) 

 

 

                V848-JbB Maple/[place] Laboratory test result                 

Normal (applies to non-numeric

results)                                [facility] ([facility], ) 

 

 

                X969-CoZ Hazelnut Tree Laboratory test result                 No

rmal (applies to non-numeric 

results)                                [facility] ([facility], ) 

 

 

                L812-AkY [place], White Laboratory test result                 [place]

l (applies to non-numeric 

results)                                [facility] ([facility], ) 

 

 

                K792-YwE [place], White Laboratory test result                 N

ormal (applies to non-numeric 

results)                                [facility] ([facility], ) 

 

 

                R253-NaD [place] Laboratory test result                 N

ormal (applies to non-numeric 

results)                                [facility] ([facility], ) 

 

 

                T114-ByZ [place], [place] Laboratory test result                 

Normal (applies to non-numeric

results)                                [facility] ([facility], ) 

 

 

                V774-FyO Mugwort Laboratory test result                 Normal (

applies to non-numeric 

results)                                [facility] ([facility], ) 

 

 

                P516-StH Ragweed, Short Laboratory test result                 N

ormal (applies to non-numeric 

results)                                [facility] ([facility], ) 

 

 

                X728-KlQ Plantain, English Laboratory test result               

  Normal (applies to non-

numeric results)                        [facility] ([facility], ) 

 

 

                S091-YoS Sheep [place] Laboratory test result                 [place]

mal (applies to non-numeric 

results)                                [facility] ([facility]) 

 

 

                V146-BgV Pigweed, Rough Laboratory test result                 N

ormal (applies to non-numeric 

results)                                [facility] ([facility]) 

 

 

                Q965-RdU N[place] Laboratory test result                 Normal (a

pplies to non-numeric results)

                                        [facility] ([facility]) 

 

 

                                        Performed at:  [address]  [id number]

[id number]

: Che Hurst MD, Phone:  [phone]

 









                    ID                  Date                Data Source

 

                    [id number]         10/28/2020 02:54:00 PM [facility]









          Name      Value     Range     Interpretation Code Description Data Josefina

rce(s) Supporting 

Document(s)

 

             IgE [Mass/volume] in Serum 45.0 IU/ml                Normal (applie

s to non-numeric results) 

                          [facility] ([facility])  









                    ID                  Date                Data Source

 

                    [id number]          10/27/2020 11:35:27 AM EDT [facility]









          Name      Value     Range     Interpretation Code Description Data Josefina

rce(s) Supporting 

Document(s)

 

          ER Note                                           [facility]

l 

LMQQXk5tMyUTYbaljQ4SEYJmTC5hqjn4QDjlO0SsJJPlhiSxDZGbU8avPOPLQRMAXVIpwO2oHIOxDkdT

vYm
aKTEbCSVJtXbEjXwFeY1vlsti7jSH0GDJiDrpeJN4PxNk6AGIoY6IyQCEvMESxh0YoGj0+EeY1yvWxkX

e4hQ3ED7ZKHYfK10nqUr9iJQThgWayaZJy9iMNxpqBa6WrxrgBTBIeSOXPQGnZURLZJ76RNHTDNyDEDH

gcpo0xt5DzJv1eOkaF95/ZrfmxtVVb+2f7/Ecoz2XH
dXNkz0j4rcyMdUqpklMETByMrSFE2is58hMFmwKAsOrUT9nGWHfYFj1LYzU98pDDyvVoIW+R88JVUygh

6wjWc8+Chente+4Ygzl3T0/Haiecvf6/4deLhXEmuDhVdQpOvZBvnHx09tEVaZKyiCUvp1EANYQDr9aBl9

2QKGaH2lrA+fZuQba3d8BeVeh5/QbkPHKdCPQP5xsG
AdTVyMCWrdbfGZzH4vkUjddmf7mRoJZ7WqB2hCsXW/yLbAt3MJT+QfMs0oN/95/FMvVPLqy/+vN/gf9x

GcnW/40bUeLJR7nt1r98eCWC6CXXX9E77tKHJqPMT52g8EmmkORWlcPUYETDi4zI867YbhGQ/IgAakgD

hUTblZIxfMY4GXbVCenXTjhT2Gd66PJySZYnEBdVWW
rhBQPULFiNLZHizVJ1wAimFt5ZNYeMWsTykT85LgrVO1SV7htDCbMarSGZKTYISZriNHWVDSLfFAbOCP

9Sn8UP6XDWWTQXBsTZOPfnEOAAFIRG7UL80BD5V82Gb8BwjIFuSG9As4O/QJRsBEmAbLwWqwHsyEbWF3

2A/eAkfBSXAWnA/jezkcHjh83QkmiQ/DozAffgUvIg
RJkNWjDTV1NRhHTlTTGHZDWqaHwRtUMZLkDwPL8URRLU6d+Nc3oThpTusKIjC1ODtdW0HueYKsVPnft0

HVyFPITmQ/5g5wWmyE/AhyzVMN7xlzkKtsCVVQHacQsAiIEjkF0QLWAZtl4E8BHlRV4ewMuGn3UP0Ysk

YXo4+w99IJ4nIdCuZoErUQytvvXRUcwLJZUbGIG3K9
jrgWXpRGXo4fHVoMwKUQUVlK5EYitTUCLrjbgnA5s04ZbaIIhgL1Cuggk9dxbAWvfzZ7gdR1IcqYFm2z

iffDx+J02eajohey+Gi8flCQANeyZrEjlCH6NHxBIzt3IONNq2BPQVtzPnbqfmM9F03DLaTmAd+SSCQ1

zo8jvRAQ7o4aRd9cJSA9ZWRK8Tq7ovFXveRkMQxKEn
Lcg4DhZbDopRb1z8kQRat+X13BjGdtgzhGthndAX4NRP77DjmeYoOhWIWRKsQxPaqPlfv3M6MO0PpAKA

Vhi4WkrhRpOOMWxns0SAP6X3LZtc10TMHD9XyebXsyPq0F0vLkNMqbiaVzIA9uMoP+XTwGP7CJ4Q12oA

oJ/Vu9qR8LIf8ZCnMhmB+iTWJEYklSRtJGkiNZKNku
USw8SXwy3UmRQ4FPppdxrTFARjmsWuim+vd3Gan8PEaIeOfGohNaiLwgCUxEF9FONje7oSst0NXpeWsj

UVBjpxeLiHx3jllIfRs8pIha/JwCVcFKIUahXOGSwkuGOMOWEc+oOAMnIiVpTS7SQnYvVFqZi8VUwelQ

3iOrlQ3i05VHopKN2vj8wapvLpdjIflfoHjYZXLwXN
Cc1vNxG2bZmwjohCd28btVAaKt9Z3Oy4Xh1Y9zvVKlGTA0DbYrgSJ6zDusptp129gSGdBv8Bz2Usk1UD

3E9IkeJ9aE24HgvZRs2yVcD3cl8ioybWy31ybr3eL3kXX5a6Vl40ezaitx0QcS+6pvrB+v36g/YUAxcD

PIM+gx+PFBy0PmQFJ8AJ0ubdG15IBo567MfEtshN7v
RGXnEyyl9uBcA/9yNgmJA1azhYYMZL8ilYSrG6RF4yhc7j0pkOteZy5ICdLX9pnbNvTqhw+n0JCWd9lz

bN0iazRulCRFyLSGOcpgkJPsdfJTLsFOuh+twZ0h8YK70MlPyr5CBYYyv7dnx9cx6gOyow9MlnZbni2c

ipw9NQsFd9UBioKPP9KT65Oj6UuSsyYSO7mNbiYjx9
wwFRvZg7bYBOmlZogEgfhq38CgvocGx600cqcf34r14c0dAwxNEt/L151rxGqMlirqqHb2wFQBj0fQn6

bz3s0M6y/9cK17hLb4Cylb+La2Fncrbkf5pNWHnv39ouA/CX8N/1T/vkOrBDmPl6BGpDGnSQR/SC9oR9

MlZIdorWGsKLgVLXtLwXWdv54FTk+QPruIaX1sJn+S
zcZpPvcm2OdyhcDgT386RrmJCkiNHwJ65Tq8PPeriaWxOkXNOb23oIcNxtZMn+t7wqgtormvWEUA8ZbE

ZdTBqLlo6+iK6PkY+8ihsKwrZoM4xIbmVyXg10sbB+BiP8CHHMqkmMZtHZz/u/x7gY8WvglBXZw9ROFw

Z8jWCXwqNvZFhFY2H7X6+pEeTNVI/F00Pv0fyFulQ1
pA+rkMsQxuxmCmVua+mPhgz0kU4umpMuXgnwKG3osCNfS41qqDXfT28pYz2+krKc0o2fXyS1911W26tc

s4JUni3dUx7fqLt3+/Ya67spYSALcDHmiBP5797Oyt/z/dC+1kbc8g3gPAruDaJujgf2IArtdgYQjC

+GU0yuq9p/VGJScrQUXcotHTtgfeBUmVhZVtnUQc+D
btWI1aNfd7s1GwtLZHLCedm/ODLpw2ChtqiZvvk27oT3iJSroX6Wp46z9n7kqHDlEkBQgP387xaIAiY1

UhJi7TF8a08eh1lBxBP57wNoGpjJWpiJPT80G6s6FOW4Rjy9kL/le6q/9iE7jKW3dxVSvs9wyDybzhhy

jw4/cpaweXK269qXkoFsiYtf/pZ606hM86N880vk3l
5W/uo7w6rY2Gk7IuDrzKH97hgWl6wUt56a24Uw0pkM53iHQwYv6BjPh9iUn+/T3182sZJz3BMn2mmjXS

em+gf3NZvPlyaf45o/3Fr5qKlSjk7tPzTcmWOF1hiQe+T9i10s4wh6zZP5f4F7vQRO0I7IMXRvwhquc0

zcdv3hHg1u8GlaYJUmbcK54n4eX1xp1Wa8EUH/9nA8
dJz/rG0l6gS3cjaS7i8dV+x6gnpS+PS8pzVj6QeOy5q+7s784F+rqHzcfZ5f+cQUa+rVb8m/yG3Hk1G4

YFDjIIX9vne4Fd6m0e4CxG+nXyW+Wp4v+H5w36vBGm6//sPmj8GFoIXpN7w3K38Wv5V6e/Kd0bu+Re/F

Z+7N5a8tLM7N+nDqI/GwgNtRZdYQ8S9lzli/aH7p+e
r+1ofApcjY3EPOVmR3HjZZBE2J1AIXSsK7sBQHeF8nqNUwTcacMOeHYcq3J5yAwt45w6GTKV6FF+CXDY

ROMDGtenOFsaMRAPdNhAqDoMF8S7rkFpX8LMPQPlqOqbsqYiCRy8qZXZp9T9g5Ep5ZEM0xs8JaXDZuIo

OzIW9ymze7WUWbn6XqFdZ1FhQXLF0od3UcGisbZRIe
IrfSXg6GP9KPSRDkAZT8QVKuTa3SJD5qm9XjYaknGDVeUjbPWEoMQKBiTOopPRTmDDN1FQGfcOYSMFNt

E4QtoXzyGWDiPA0HXiGeX7hvoow3qHD4CNElKmu+SkmqgUVgCS9TFW24oKIlj1M2LVLxM7wcVVEkx24q

PIwvBBVFEM9oFTIKDUhdHRlsZJQ1MaAnovakWBDmRp
96wQl8qJMnZNWtFUcxrM9aLoe6PxTsn8PbQy8wSj6wvOHjEp6VPMXfCxjNLXNpdL6pwfY2mmNnQUAxfM

SpQx2vn0x4NypoUq0iDe7oDJx9RqIxWpSaIUVwOa3siK16KJqkckWaDw1JPJLtCbvLSOZpdemnjUjmrl

H6oKvbhzbpPz5ixHN2zBjiV0C2dlhsa8NyH0MlA3Nb
MJ5utxZuCvOeLD6nUIFhZwbcWu13tW9lFm7VYDMiSxBipp6qxJ9yeSMweOOfkPjual5uBZF1H2DbVbRp

gb5fvX9TQSCzPgakajt4QYdpIswkwW2mxb44mzesQBR1jDVlT1GvG5U+CjxkYzpkYXRlPjIwMjAtMTAt

SfvKWIK5HkK3JumaUNE2RTY5A1CaAmFyqTY+Cjwvcm
JwZvHkm5WfoOJ4wN5nMcj8fjEvImLox3QdgRY2uA8iMPoasN4sNfWwAb2okSO4zZyoN05zRcIib7NgPz

MbbS1tNCHgMO3pVwLnomYxQlMbb6Y5JGAdBdc1xDWsJoLtx8A0W7TlMsAbBRRySDNLB5QsFwOjb9mypr

AtIjO7L4JxHmhKfx1lmEDicm8NUDYoXjuTDPAEBHRl
zF8gWmCdYWfrkRNvOkGRPoKosoSnd06+FjeblnXeScDpq0FenUK8bH5nUrc1fxReWvLsx6JfbOM8vG4v

WHmumC6wXpvvcE4duCX2oVryJ06qHuToi3NdDaUdgS95QFOwBO7bRbTlfuLfSwWvl6G7WWXoTnd0xP8u

HtKeOKI6h5XRa64nPuPnKXUoRIIHK7MaTtQkw6djqy
RqJwJ2M5vygWwNbqCqtD7uIR0cuX2QLDgilAaONBGhLAA5TKDjyJF+KrHzUA6jAS9vY0MgREzxRKrgEg

9rWXsdJLnpyH7gAr0oxGVtKXVrBJV9JZ2DAInarFfOflIwrWLRGGGaFxEzXvImRZPtMksUBBO7YgQ9Hz

voYWP9PIQ3J3jyzQxKleXhcMXQTUHtJrh6D4NoCltI
VQDwaimigQgnvu1BKI0gNRE2FmJLTgs8G8a8jB4ktGD1SU05E6lhLBKyGOEkYV5dAMUzPr9+CgplbmRz

vKMmQH0ITM5rb6GqLjewQVHfIisZIEJ3HrYiZL8xjyrtWwSvPG9uhkz4AFvsSX3BQW8hBX9FcCOVILMc

SFhmPMVdLY4clrGqfNhbMD6SYK4sjKfwXMDsXCYSWj
VmN8RutNBxkrFoFhapxLTVEHNxLWVODMAjD2SrxOrcVDTkAC4cT2ZYEGAcO9orqRmuJeEwUhDtA1bdqK

fsjVU7RQnbXN5NkNZaTRSzD33biM7nWO01EVkTNGYqS67rt1IYiRQiAVVgMFY1uZRtC8PzlBb+PgpzdH

DzUP9PiReybPSPCJOAevR+nE2KMuUHWNCEJDEVJCUS
SVLLLOPDGLZOTMXEQNHKGAJoADVUIVLRUD1bq7BvPZKeKpGaEL9sjqjyYcUvCX8vgzx1KOnhrhKkQyjF

DQJmTFKsAvsQOJrZULAbAtVwCJHnMK6wPfJaX9U8wDZkP3uIHctsA0RXYMPiWZVoP7EpAXS5BFRlNdhu

BU8OmDy4KTNrI0BgBGGnVOVcc2NmWpGiV9I4DpI7xR
QfO5joFHdhBaOjQ7rxVPKjVYI7TRkoRY0JMHfaoXPfKzjGMAXvCud9s1ZthjWliNSatnAbdNX8NoDrU7

RuhJ2rS1HsP3YzRv1BK1ZXPETkPOW9KIQaCz8XWCYtF00ex2gwVMLjWXZBPk7+NkB0euSfaHl6zL3fEz

xV7/44y1990ysja7aQIhG2pNnpQeizJLtWSZAsU3bN
RSqEEkqlvZeGESXKqsjMLP/GSWpgrYq6PS9O5/+163140S17hUu28/18Pp/a3jkZnvtSWHYVvi+N6urq

zyiampr/yOePPHXAkc02qTJ42kitEkQufUOEpxrCezvap1k57A5ulbVJBKb7JXDQIiL9tAK2KsVCZEE+

AlRCJB0Lg8sQQbov/C9WPsrjsTK1kqO4e1fNysDqmg
nDLOBg6GCbuYabMJfDbFGnWc9nVZDZEpa/F16/qoQ0judFHa8NVGDXJDk+uMBpZ7d4nJmmLRPSmSeOPz

FkFhUOMIKmq5HouAnq3Uvlz6KknGHGRiaiNSVTl7NsHOmAdnta1uw/nTqGULKly6UyzKkf+AnCBNKAT1

VU0kD+IFCHrISkEJnMUlhYYOE/jvr6+acO7UaZCDtc
LAprghi6eQNFgWdeiOWSvUStlIF9bUx+ka09r822Nuxj8TeW8sj3msmUo38Ol4tqXQNbG0rlv9ZzxkBe

5494yEfLDwYUEAiT3iY/nXwWWGDhd+Ae7802B+mC0Dc4SDEuRuM+IzOGAWUErPrT8xGGlTHQo4Sx+BUU

NyIsjCh3GWxNr231MwpylYOtcjVZbWAfxetmGBQObB
AUXUW7CNAgU9wnbsoSMhim8jE0KRLRBoUOGFwgDM6Kmx50B+eDBRZY+I9nzydxVN5stAL6z5zkqjOTiE

I1mk0fGXTU7TYPtbTuZl25x8RGiXMcjsnIsfuTgXJQ/p+cmsb4e8kH0MjNvHIXFk1gqGX2DDAciuNSWM

PJKv8eeetUHkV5/Kjm7knzE/90tlhggYV/Dt5fKipR
P1+DnPC3vXRyC7XJZUFNzHJb5WYwlbCBjty2R7eqMrOLdZQLT6bkwmN0+/ai36Do2h9gfAMdXns3+Pps

4un28uemf/cZJHwzo4BYPFRyZRydZUouLIFQpbUVpnyC94pt0ZoRPPfco7+vt4/PFu/tAQH5r/L/lTz8

UlRXV1+5yUzf4gDscDehzom8Flsu18+oMTTEYTdw82
16078nDNWi66pT17+yjPHd+/jpQ4OtP5yJr8cIGGJ0YAWO+dN7cHx94+7/rdF5YPYxhe9lqPwbc2IdRE

/Tbb58+WK4BzBDrhdwf5y1QmTpZ32JdkD/DXpJA7+hrKUfRdky1MessZTcghA8FR2vHtHtCMKDHhtNA7

iCgTS+hp10v6CSj9yDA+wb9QG5oEmxjBnBGAdPDxY4
J2MmY3XwOLKieKa0HVgje5+xURt0qOtQj5W/mLBLtGDDIaPZhByP9kMRkNpLjbz580jz/o2s/LsdWI9t

BYpkKqnkmnECywS7Ael5TTQV3GBJBWkjs4KMhJDw3dFjQKO4YWUs4R/SWo2XOwD00iIjgenzQH3KjwVP

V4+HRILm/LlG9gVR2W9hVDBf5vO0G0KuX4+5fftnIi
9+TbxpQZUOR8t81ygvrUfJtajnCisWGxIHSszGaYUeSgzlAG7LKuIIpL39nduzZZubYHsAH4RJxkmEO4

pvC2Y8vXUDbCQpY/7JtiRf0UgfzzxXMNjweRERIvWdNMUisffhK3LGc2VQiAV1zfj6FhGQPU6ubAJRG/

tdJvG5tzAt4uAtkakvKylt74qcYLGD4xCqXjlLevc0
DQYxpmue2yFjfx4c0sipvi5hXT9rlUrvZaDr+gt54+vUGLE8I3MDeLAM7TfEFxez9wLLQg8YRXDsgo9h

1qePH/6yFlHVkuiJLKpDLY1f8pm9QYmgzwtqGwKcIbFz46T9K6XDNVbwEN00QpwwiV9/Sy6B0hhtPsG1

eEDFNx3sK83/VgxpvN5qggO22vLuGklVyihe7Z+RwB
1k+gWV7XIxIzSLH6ou4gIl1YtVbWZlXPwbSsN4GTHwg3rnnuw//+UNABfzN321icWboymiFD+Ke3lVVx

moDVt08cc0Tb03UW7tn6lw4j4lQEx9WLpCyAUbT2AOUqx9ZAlUjeoSTZkXsgGhrfZwS9yKh7F72TpAn5

/RhukjyJbwb06IJeShVJ9gdSNHF79cZq5jEmwN37+X
zR/Gi+mYjgiT1EQoKtiE/6TEBUsPYQGu74YJwf+Z38uSDxllSilHm1s7sxiGdoZOjzFe+Aclj3xvSOl6

KxsWDvzhY7I91aYjTnoY5qGQnB1y5/xnfR6OXHPGixLOP+lv0gxBqD8XG+bH5K4sR+yYAJzqr3i/XDK7

2YyzCrQsKz67ugXkYLvwDqcn/CI8kzclZb0eWUDd3U
39A3X9LtxF+JCoANlowDLOOBSWMp0hbny/VqI3tQvvmJ/DKyUqxsfLBwodwzBPHz+POSgOAGwRW8GriV

P+tRqHgCe7tc62JwCvrklQroNH+CQG2+H7BLJXgIEumI64nKq/alaanLBBBsw52xegcYC9fIi//iCHWZ

hNUckt8h74XAMP33eBqWKxGeJKI9FuUvHLSxY0xFp2
eOGCtroGyhuzf0/uonzi1STepqzbLp1hfnz16GInU7YrR2W/e/dOgJvEzcb+h4aQzF7PE+gO6iJzb898

QCPm4+Wf0bDaJ50my2++jTpyBGpEXloG8/cyLaRWXXBTQQevXzwnYc16QArn1qa8HvzDKOOXIQAb6sx5

cxBGSEBgypTJdXV1+S3v4gqVM898wGv85gP3D37S3R
w3bpHW7bLmGJPQ7U/UU3h3liVZMomKjP/HUvFjwHhD+ub2O9ipRztxsRUPi/G3sBCa134n+0w4FvrbD5

rvkgonq6ZXybmrGQYPr1vnSnurxF+8c+vTC9cWSygUwFl+4IPmsLB7ILNCoiarKya/7+zQV4L71PbXkr

JDwSAqIxWNGBsVXnP59HWmSqNX8vFsSuU/WXn5+/fv
Pn34BF+/kjH1zAvrxBk9Us99Ea9q0XpM+q1t6F0dIRePC+LGL1/ooZ1gEYzdqYb43Y1zzxcdlhw3O1dM

GjRKRBsjk20iJPOhWJNDiU/W5nS8pECwxpIs35AVebbir4jpiVwXVq2VqdUJyxbWmEgcFa2nB7GU7fe6

Ywid7oUvLXZh+9vsal61Fy3Wpbv7EVazcd6q3/LliN
HsgLJFzKIEjamIcRVA7lcOYxhGALyZUch34XPlne3cWv1e2lUjc6S9frnO9MhThGXqsDYy6Ympi8xGsY

F1lp3KID3jICpD3R5vnCC0eXjlQmxUslQQ++9SLy3K42gnkj+twuBVEFuS7kTgPXw4QPRzU2w9BHEH6h

izEgMHnd5rWITLYmo+lhYkPj4+WzWwxRNYRy5aF7xI
PX8+LDQsJDgocldkyrkUqBeaYL+RK7Ro873wnOx0nIMOqs7TvasNDhICiQxIW59y+exZXpQ6+sIbndQB

EMdN8nZX0Dwm/1QuAg3fPdJB8PZg73eEn2WUq+vrtavXDuzb/zq/oL6+qwZpeCPryZMB/QZAUU+xn9KO

VM3AirsDmFEIBOCZdSo7DVUBsIJi5vCyPqCt7AkB5W
xsiKecvAKIPRBGiJdfD/WR6+FGk20xN6579dxAYxBoFbEPLCqVvuMF4EGZD4/Jh3zJLGxDDJ7txYp27d

dm1lem7UU28dzdWDQZAz7oL5fnPjG4HbycYmQx9A5s0ipESIq5RPlv+gpVHSpmZ5CsuTKRQSc8blTQH3

dVc0uUhzFuBX3+BWmZDevX0/cmLLbAHYFqVKoAiRez
8JVz3kt7xdche/B7K782Uo9cn2r9Mz/nnnd1dFenPWBzqTk03673hEbHlbfeXd0++MTWUeif5Vf2V5NZ

YtuNLyoCCJb83sLTGlhO+kw7JfUdSumndZQVHqroczEo631tdDym1DM7BU9CyVSA+/dpG7+fr5+UuBRx

OUCIHBcXIVkZDfeMeOaPvEIvuW89d3DohqF9jf31ZR
QAi3DAmydSg8tT+sQccym9OQBV8WGwC3L34bUxwCmTRbj9Q5HFBIjTHVLiJSdCXAPlEgUi4rPrPDsjlC

QoyRS1q92GuikPhuejHKkdGz9pNrojZBSlh8floNiPUMsWgzSlj05jomoES17+hgSqTFx6oOhGoEkD9r

ozBfznrcWMCJhXMyqIUPsMY5QJLGZe8ztPZKw2007s
IyWFzUYBpcCzijJyFoMH4/EfPniIj4+KT9fenQDqNyIJvkYyFBEblCcZ+uxZsxrq6/AQNHm6Q13T+6tX

rXZxdoY/AOXRTluabqomLanlzOaxhuHJaGtrmg9n1eeX1DdNtVrKbNGKh+73FRT2fUQhEqyRUVybFbCW

7c2dMdxaIJf5tBPACnR7NHc0iZRrOqzUBiOgTSejXY
yv5PsTDJSCiPmxWGiP8vpBTryxA52O82qsnp6HKOew6VRphDqV4WEJ7uAQKFBNtohMU4d5ZATh4Vywfv

lKikpsKHVDZVmPHAnpgTrl4+JGSNLcovBNITH8/mt9MHAlCNI2mXCsdDNlVw7fDEURGUpNp7SQ0Mfnj/

f+0Bu8dJpSopMVrV7z21uRp1ykkDSSHEozczA69Cex
0Bz42Iy+qiB118XVP3jhgkSK74434+9VJltdoPaivhLL9nksDnujFdKtejZ4cK3kWW2nFR5M4uuGKQ5o

R4yC9A/q3x/fRNrXKQ2RkOl03OuCAlvkzZIphwaAGoMiZ6aXY7yV0AwUVKTpQKTq5MQa5cKEQyaoQQ0C

ywLC0yzKDcHI+gNqJM9zYWA5yZpE0Q3pNSz5BssvQz
LxF5ZpyAG8wdDziBQ3xg5X1BeRjn16kbD7Wy2p3LBQP90ThzKWzNzmiNQqLpUY0p332yVr54bl7qhWrj

WyXQyjNDUshKfSW2uJe82yzTpSvziFyJNSENGV4+oRpjb36oqUbW18AxJhsRhMSD4ue3M6VIMBTUtHWN

VU2Zl4Zr1kKn19xHKGtVOqHvErdKMlihfypzy4BdsP
BtIajfMkMk1o6OD/54mvAOGPOeZe61io+KmdwUuLcQq24sLIvmFxDBMO2xI5p7E7xDLSderCtke1O/Dh

w8cNDV+aJVmj4sfxvwDkAaIHrtm2l5gPTHJQj6eYLOx/88/YmL7mwtzAT6z9ZhyPWcY4C+6KUobrfgX1

B90mPm1yCSQ3+U/hxY/it8c3rliID1T1BXLgvxeM0b
NcXS5T2eVFIIVsAzXy9Yl+fPECu3/r5k2n+QtA/eZAC85NrM/SKOEr8H9fYZ9Rjs44/vHjB+w+vHfBPE

eoViD/pYsWf+6SzdKF8jPDqnXjp+gvJSYetGMnblU+OmETZ55WnRTFFa5kKO5Pkw8+fjISXfIGCGaPHz

8+uP+RkfUJzcjuzsIukfm3kNTXpO+9noDiS11cLyBO
SfMHdw2tY5TYnH//GjDbH24eP7XtkRn0IlyN1jSGVkdAcO7DkYXx4U2GpFbY+YwXjjak6iQt+D4XdPnE

EFTB9oo9ifDkMhK6W/7x7gODFqLz7QCJ8d5evrLKGo87/bpx/XoYjoV4+B8hEvmN5qUsalQvO/vB6NzB

Dxl4n39Czb4nF459gYFFDod0mMvBBAQ0cLa1EipXYV
97wTFicEDV94Y7Z6TZwBdwUy1mCeJsXA4ulqe87iuiPuCXPJOVdzC8dZlbQ2XHbWCmHsf9pXMOaJe6uK

PMJ6G59SWqJ1VD0eXqqVjaa4Mxwu1/wGVB5+Xk5DR//hPLhgEg60/F68MqdgzYM35/xopLUU7+TAKtQW

rl8jYEdBucZiUOX/JweWkt8Ijpw4lJLAY9RMOAYQhX
0OZ61cwZm5ldkCFcYM8zc8TI6Rin/0H0l2k/RHaUol0EzWtb8o+N5aQcYIw5V73imq9Wi/v60Lp/tDQ3

mf0hVCVN4LsqfHeYKaiVmpMbrEe8H3ecpSfEpAq4g8Oey8eEtKy3FN1PKqAWwVuJhePBglJ8stwlAzxx

yUlE33es5lQ/NduNfvJJtLuEILm2l8qBgODG360wuw
nqTTDu8OTRYzvvrkV0QgSkFDrsP8ggI8XIfuWiZACdpxWw8EGNT6P7c7sKGR5rBcQWf+4kZcjO7mRzG2

+05+myjxF3M5A9Jr/rQtXjrW4N8Sx04qnfr875UPKtL1omBs9FPWHSMhyDvIEcNc1q8QnjKQLO6AQN1f

2aUTYZTKMExll9+pIS8QsOHUT+0hWfP1YXMizB4hEe
74uE3g032bSri7Qe3w7gn63B5hxt3DPBfmdmMICk6ODstjSdjuHM+6dZoQvVGSKv8FpU/AIgLQPtgDjd

twLj4pHLosNuLrs0crTvxiY/uCCEHox1wg2jlM6pDAY4tf/pLokd9vdYjxJNhHEXVHDOvll7DQI78gtY

RXJIu61oYT8Xk0Iu5tS0AdVIOos8EN8dUx1aafq25K
kh++TVcM4xNuWN/LMUQ9Yq8AKhNc9prhtdc7eSCXGDlm0v4SYk/nzK+quPk7Hhkbed2RgajIY8CYXlG8

IaCpD+QIxKwQFWKgl3k92upo8buQWPcaqk9Wl1ayapQDRo/HPML9kjrMgk67qyHYQro1e/8fzFQNN+8N

SmThL+Wm0vpMwsrRAqTibUSx4pwzwx365A0clqhnHR
u3pTSxVkRUKVtl5yHg0lvC8QSHoiBFpmDeV+WCWRVLWYgQK5jcuG7WrZKRgY3gaNyccwPOQmeHdHw7z6

jTSNEkNLzQo5jRvj+10fNp45TQPrzY1H+HI5FrASX1rl3e7/S3dWfOQIsZAxhOVemBWko+FAoRqqalg3

LNgXoEQrn8M4OTds1G/4+rmPc2K0h42U1zGUKxJMkm
ArFSTnXvPebNcmiQPXsYx42VZBHNtxuzZIlCBop9O4N7sESxt//e5ISHBQTVVj4TM7ujiUcduoQhED4M

i3TEAke46qvDETQxEz13zwMo8cXkUSO2+cFX3s+WF5Nr2d498fTun2HplmeIjvUEnx0eF4vLfTrLuNHK

rKXOHWYzzEaxoms7OBwIHTsX+qWuFSBg6jVoBoPdym
Em9sMbyk4iIaCfWzPKyXRzul6QeLEvroFJC2FC7m7iKqsjvy3z6fM76MB+hbDuiSnz9/itynzHWM6yu/

I1W8gq8qH+XL45Gny8LayVVL2H4d7qUlWwmbYEpleZaQhK1y9yNCN+kjb/eQ0HtrRYMx9utZ4LrBiasA

adwXOlq2OoMtXLDlR0CCL89+V6Zw+HpvATkKetm+PX
t+KJz5I7RethKHbBFZfPYyQ86tmRrymKCHGPIb2q7yel+TOPoZL1B2AgZ+4gwIvPjM2oxCE/dm09b8O5

uENL/c5J3HS9w9qaWWMPe29LQVbxETUBRiRKlwZBUdy1IOmVQm4g/K//UyV7Y6Md5JTnA6+4bJaRX0cT

ErXyt9VH+GBSoU7X421avH1YGnabsCuzedTShJsL2+
cbPCgi9J2CpNbvu1hkt2dfwJzHG0eqKqiP+Mj/YHyDnZzMwp64FLTfCzNghICs7hRrsisTUC2hHhSOq9

sxheT1XmdUCAGYr4VnrErjeRJ1ppSEUgOOKLCDLcyhURqi7owiCOvUJrGP2S1V6qogDUKoC/XcH9kEx2

pDvR4ahI1vbie+AANf/NilflQk6qEuCZu0Ul+NChEC
1zKSAAiEnWzUlan8hfg21ViKoTqctfmnbsrKEM3aNn1PfsgRDzAXaMIn0fHe4crL6AlFIMKpUUNy+Pkp

u2yHCZIdlnyDsCIiRh/AoB4mS4eGPWYmeVF3my0XiRgkyyWqrXoLBWbJgoo5jcuRebsASssQKpyPNXWF

MaDQKIFrRUkOIpDs3UX/phP0fX4R8BRLvwbpKOCfWP
jIeB5w3xFvnOIlU4cGqO0HTWF5NCbY/7DTxbssrOMgv5qopX3y+4CjQWefwz10BI2415m+/CiI/4DJOF

vXeJ3OO8LKz6NQ9geQNUf4+nmF5him9d16kT4kSKLjejAQdetSvxCG6MORHVtHxaGWCH/Onh8LlypPmJ

WzRTE0RN1EFrxW32O1bUpE5Zh6RXSxXCjkIGGdgPx6
j19Dyoje+WLdidH+MS4EN8Dy7WTNLRRDxDAlTnozSRFvrXiGKDOmOEtHI92qUoexufAPeYGCVb3JgEuB

jZcuIcioijcw5Uc1cDafDOe9VLo2Jj0N0kWtupF8jomXRfdw4WZQEooE83dLAKIb9sRMmqWLmzjUo3wO

Tff3nNf3cdLgYWbtC0Lr3hGodCEawQGUG5+caLa5Ej
/aUa3RHRZ5A21HH5U77FeEqmH52PaAWXn6afCkh5oqucr+xknPJWVLMczDP2HfRFRzkUYxu4Sot1pHkF

E/C2MYSMPQeH9Bm8n/sfXJVIQGGuwZ13S1bHHBHWBI75fsd7tjxTwXqnEll2kQ57vIW3OQf7zimpkY9m

A5Dy0haqdbEQofnd2zWKeN/Xdv/e/b60MP+BWf7jk8
RroCHqbAscz2fTMMS2HKobuiQHu+Uslsn510tMsKyYCXDLkYZlNIFRi51CF5yZa4J7O6aHmXCX4Cp/fI

GmvALsuQt0Sk0nFooqw67wuHOAz3kFyQL3x1RebMicR0cKXWgemOEj6Eu80+IRSD2jlAmWywyY8+6VNI

icZPgYX6CL7g06U5522rOvss/NzgMKoLJEpKqU8j4z
wlfqr1+/8ySSx4Lb9rHFC1qZy0U/Mec773ivSJ5U0fFvHjS4sIEH7QEzok5yJOrZZPvaLd/85q+yb9Dw

Tm1Co7rTz0HalRFLYtbvVB6bWkMLxSguNGpSTJZhfk5hhSSWHs/XVzlnSfefrTb3HgviWmCzAxt0Ms6h

VbpcbjD58hCzlIKoQNdOoSaQSHJGG0BjadOO+vhe5s
O5we2ueynk2TK2UBlHTgS1p7c35NI/6wUq3QHBltgKruMdq/7s+ZNMFWC61PcESStkioKLWXiHntzKgS

JYCXz0tFLyJUtBEPEKRDqQ1cjD1hw8ayF8Iu9camBQK6Gc0LH5+/oJZQ8t7QLN9J1cM5xjrVQhWiliKT

E1LRJpNij8hT5YW9TAUTpPjLCJ6MJF3kA8pGv5sx1F
TrkLQixJQWsvOUtlRIUFiwERhGIiKuapk8gLCeNWxXUqzCWtyx3lgdxOzm1rktn0n4CgJliWHu1L016p

yzxkSUmJvBwyQSYhIoprr+2/kLeWcR4Gr57uw9ntnbTzRSrFH0jxgQbdMQCW4TPGQ4ejnhoy7J4VBzj+

hzd6xe/rNPAejHOPE72AQsDFPV9uWAG0ue6H0/zFi+
Ww0w60hStJi5x7fhxZgXpu+Ta/aL4n811GMZZ+keb6SwrSClD3yLHQSTRoriHwX1zfgdMC+KaNnuyoY9

3Hbex3JzSOr3tMDXXl6j4lAatRcSUiZynD0HaOpaxNbVTaWAdHcqq22kdpXOkYS4KW3kIjN0n55THi7n

3+xbsJ0zpl7P+bmHMR3euCtLv8M2HLEsVxBgZCGpQp
1NePiIjoae+Ftq9fX+Bc8QdWNISeRxyShEIGNzW930MSOjA0nNzFFbyphZza0JDyY2HYHTD8QYL2zI+B

vsiaKzAAd2TsDVZ1JwNPIV5+ybdOXFm4yC+sE8rrzpEtRCgz9f/HiAeuilLVCY6QCIlqrM+O2PDYWTVP

YASZQJiH/X89kTa4Hj7ZfXemwUmLG0XuDi8bRTWNgX
Sp/1vSUNNIxBO9GSyLO/XIg0aEALnym9bM1HJ19CfPJm9DK9hb9wZBZ+Jf1kavkQq3fY+UNPlSg1sOEJ

8FD0S5rj+nnPsGUPl06MDPQdJaTUeo+WNWN0CbyHyXiNPF1GXtc1ssNo42oMr5swNpyn9Z2h8MflUkpT

uYQnR08LgBUtNDf/vY9P7May0tPaBvd0+3D+6ngZ+q
1hmBVz3VfNAblMbEO1vWJZNUBRdUPqwwtIZ+jz6WvTvkYCK9QePLSVCSRcBGCj0a5F9060lNuOS9OyjF

uXspT6Oj1gG+bGwYc73DZQDqKDZelwKZOQgbdqaZQd6HttKP9bj6vArjTUMnn/lU3NCLOB6v9CgOjHBI

EW/R2mk3FotURfA3+VyDu8O6jtF/k6fC+AUKbwxdR3
wl0VrleD0NmTBgxu1Y3y4mzvROz/zHeGPI9+onPkI8lOCCX19EDNJOwgtfm0tp3ALrtSCWXcLIfFzQK7

k1r0lk5/P3jqlUvWBURslD2Rrwf5sOJ4lxeQsLPffakxRWdGLyA5jBZNgJowl57Qg3/GDOLreEfXc66h

8BnfjJ7wVSFSAMj1KSMIESPq3bz17ZCWQWf/kyD/wm
U62TxXWFbhf0vzyCjlI6X9W1xCrjjouHwi9NWrTUfISPCl+GTnUpww8y2Z2h76Y/bfeal6ujwkLzubte

bHZMNdtEEF2ORQ0s/ra9hLlIpNs6ziYvJj6FLvojoUt+oFsdMGR7emtTCHPxdEN7ZUT3+YAS3PZsPpVM

NkJn4t+OeZNycXCAXkO/BbEY6LWfjjTA5HaSVLv+bX
gbbDDRrQzxn78uoJt1p9/IxDuEdYSborKcfFHIR1H5e7z3rWfn15FLRdAHDbtjhnV1kY5UrVqnwtdfUL

5UfBp32iFBo/KCegFnsRpvRt92LQ5G+24qn9ExFBVyU54MfWOqSFYf4xmI4IXS9mXfj7xy3VqlILFPN5

d9CzwG5Ezal4eiJcKu8rxXJZ0V0Hx2F/Pxs41recUH
Wruock+3iY0DCeSrqJkP14N3zOB+P6SUB3lYmXIEy84J2EeNf0welCxkHQwEPseWO/Vpn7v8M8zPufrU

0064SHnyhk1o0V6sSdSdm2CCSS+o+ECCsL4kIWaHrBQMiskUV666O7LbY/BUOzrNIhtv7oV20cGCIbiE

jjcj0idLrE//crR8j8j9Wv/sG6TBe4YL2tizzpbqz6
Zdz2TiazYysydNUVkZlh9N36a3+gwv4m4BN7FWW0NSB6on+OZYw4Z5V3Ft2+/n5pFVLfKoH0IEMboUNm

P2ReXR8RHUzPXVhXMNPg3myPQgS1uSQKyjlMMbVRJyu/jPcwFVwhiKuxG+f/gS5TTFoFBt44osyJVBJ3

JVV36CyAgl+fN76fVkcfrf3b/TzUpDsmdOm/HhY+9W
x7y8V/5+0gsHayEXNypdpmaNUORx6rUdyzzoNjhsRlIhrqAIZufdIrovXTprnBdMTZIthalucq5eY5GU

wjJeTChBBXmaDcna14niCS3dX8OT0uZkH31+y9KZrDd0xjPbiVqf5SNrUNePPixn8Lq629fV6yCTbu1C

X70e3zVYQDYTT+MZdUJQLV6yOj2NZ/B8JJrBytKgXz
hsfaKXYGOl1IaVnFsA8No7mOsXbq6+tXRKk0CsXl0cRvipW89kwNNMCvWReFWO3+KAm74QwqNim01hXW

X3WtOLouw3YGBy8bWOCi0exLr/e/zG7RljGBl9yqIL7oEXghd8MxXUUKHayZqeiSEUWkpkLBOz5O3OHy

wbUQSFIqBCSEUY3wTMW2UP9OW/p/s+gZcyLhmipQHy
Sh6ms2CKmTBY2uEDQRDcrNxuPM/8ST0woYonavPLTCkDVeylMV66cScfuWWHEj2tsW8U3J9IQAIJRCqa

4ImcDnhNdPPsHVTRLZ82P2SNN60SqAU5uW99WA+Gk9jTdRjExStYQlxqEVSL/AfYF3bTM4QLuPnXOR1e

fKbC3E75nbCyZpCTDxWC3swzuYUQqPah7qJ8BJ11u2
PaZsV5PZdXUngHToz0+Az1v4MQwqjv1fvpVrJS9wD+V46OrnxsgcIh9re4szvmwZsjWqZ341arh624D9

FgFylaLL8EZuvKH6wFd9KM+i5VR6pgPbWkCn8lh4juW+6oKIdA31qwh8YXwURY0T+KleIiGJpIHpeH7y

C+GtB6Q9e9hkDCJ67VcvnmT1dkssSyuYqdB0Z09Pp4
gLuixE1KQ/XxFFIxBOQWmd4X/yXm6nHvvzdiJe/TWRD3c92TRchQnKB2laml5keKXwpfwSOukE1miMHZ

PSW/d2YtxFPPerAJNKVV/4Ef+7xTKEg+U0OzdGHug4EzFWtHDgCKCrrCdLpNpur2LFWPHEScl6VeP626

k+vunwAkOyuGBQCRTy2Odbrh3fzJuvOOeDbBJ8SM+2
/ilbvjsvl9YdppcNwPZ1z+EpcnAFlAiMiLiZUgEWHLfbCke5Cbeq7+M8NIt9RvXV8zO4/kUVU2bhDlPD

zlG1XygrfZZqqagtFXoURp7PGFwvxUkZSj0j4GXC+aiCR0QFek/l1e7tFef5jwH8SDUWtnWiSzWwYovq

pO3BlnnEcBsCx2qEG5mhT0Os1ENLTamEHKbn/LCko/
ooNIHvOnAI5pwYuGUCOyUs4IFtfCbiZIbzd+l+84zJi0RldaDQQSjrUWg4KKYUUvcRGiMMREPgRNfYCX

uNHzIfE8gCUsmLyYVLKpd/BRoEhQpgAEfKLE1miqZ7Na2hFQSaRZgUcSHCs2kEddYbz4tsa9uBxCsT16

++UPp1qcKzfQ6YGYxmYCeQxb9dBhuSYHJIzAh0TSfU
kHzb0nVZX4BP4TSnZAfj3wHFNL4CTvB2z3GQ9eHYUNkae9VDWnQDKp44wNczPhBLDkWxjQuW3yy1ZIUc

CMQo9gzrQ1dPCG6yWVDLTjBLTo9d2+qyEHzQUpZZKVRzEDKGw9MxwVHBBFNABnOXsmOohAKbTjWe3wch

97LpCUnpPTf2Imb2L5+Qjo2aM5EfIy57K5jBdL6aTa
TkHQD7B1+p+gGLHkQYsiji/OGlmjE1Geh+CmWxiKrlzrNU2DubxvVZyOikvq9sLsncv/Dn5qWkqoYaZG

DQsYgQD/ZTGWPBYarfdrAR5Lb4l8o/kIQVDO8dly+VduMMS18I0I2tMNKrDuTKTBW0vSjzHlngDXJ5x9

nPznGrxeF0V5xLdQHVZgn5ggEIFOZNOSbr5FkA88Te
VlnpA6xcPJHgS2Gw21H/XtlmpiQGVV5RVRdPYlGybxDLbf37eDmhirXKWMRCeglisnBrVxXv015BCdkz

WlcXuQxq2pDzay66Vn5OVDrFJw0aNAoAwmfJmTvHWwfplibsgaF2dDZRJ+CumofeN/gjcAp0+j4gEzjq

w0inQbUuj93KGb+JIOOHRYQWLMG3cMiWn9YxaxY3+/
re6ObmwWFSBkMOIQkZIYjkzspUXsETBWsRLuCMQGld5ZEC5vSRAWUOdaegzYIHnVKXTq7fnRPMz9vO5O

6CmCfTDb/n9WTUdn8CL+dh0b3L8bJjstZcTkIEcxLMDXTaPYs1V75OZ6A/NAztek3FropjcoyzGkK4OO

X8s2mmPh7Fek9pvr6csG1ARY/RWpLsk1knAfae4u1a
JMTBzwdmCt+WvbSJNinGJ1X+GuXGTZATU7oGlOQyR/LB4rMsZDM3dGnylcJnHYPcMGD+QEsoCgELreWU

fGj6Kzs/Il8v97kSQIiefTVea9E267GgCazE30djyjU9Nd4ABVQ+A/Pbj/oOGPTZ7Lmb+WYZh0cpCeB8

4Pf8y7jBQYV9dWsy+AjuuCSInHUzder1tXri3WNcsE
aekrLl+6dU4rBYUf9Ec6AZJsAfNWFEiEjbGTyg3V2B4k7H/q3lO6P0AVdNbN3cy6w7ncLfuOG6gs7MKJ

Be31AqS/v+W5jXJ2kRFulmIBNPRVzFHVRMuCZsozrQQELNfMjOkmau/igyy14nKY9F0z/a77hh0dvepK

MSa8O2USuGP3ROzppPOekt4dqc9vIU1OBSZ/5fehou
LRw0f4/qrJet3hZ9HCdH7//cg9ARNt84rmI4bFf4x3937lX5YRlqrb3Vnhb6RURfEfrKXC2mHrFosYCG

YopvespyZh9at0hxFkpaKvuEv3KUgqhan3P77Md8Qv7D3azszsd7fv1fpPK5suWvgI1Kivsretf2K1vB

LvqSI9hdoPB9pteCEbKPME9L7sSzPjgFIYw10t0UWe
NPKXY0z9odi5d1k6eRPHiG1hRosv1qrOqjkfcgeFnEcn5dAdj140v2pUXf1WYjsC0Pt53AXmolelvHwV

NJdrDyBQoowyLNc22VxJtTWgkYd0Jbxny2f3nqtCZXMjy41kypHwCf0+fLm7O+tiXb/s2iW7b9+6dOHC

kX09cmR8H9+gwMXFZbXHypUeHiuW/89o9NGS5Rvg99
xe8/Dio7ERYQg5dTDX2BACqtnYu4nrzj6VAAZBZbi3HFcsoZV2Ij+tvq/bH1H2UN/gbbrf3UowveyZck

P61QsIPPjj9mxBJtY7fL9T0cMN7lWH2ziGhdj9c75ou/kL+Df8207eL/mpy/mDFxa3rDz7sARx4ld3qk

sCtgdEHz+oh6LJ889Pdul0MZhNununisXj6Z3WtljN
xbp+f3BBnn1/cAiqYTYkeKpX6zlxy3qdg3jpg9xEtWqOfggsrlGHe4tMpSFRPRHN/sNRfhQ4DFNjDqQV

TRI31GD8U6z/X5Cf/+Q5atAsujOoEakMU7huSuiIMVeoep8kAgsq5r9OU/geclP9EIakrKqEhAbcViXv

MTzCsb10dgx9oeleoCDDKczc/FqUvH+PrLzoYZct+q
iPhIVVLhxEixcianXSputJu2BZoT01SCTOrw2FkWdcwEOPPsLvlh6pv0JZ+OYl0N6aZ2eeVQNLkZhJjC

hUz3NrM6q65hSMQnby/EeNdOHqUBdKszRYiN41yy0P05wIZdwWp0H4OWF9Gs2Iv0+6zRG42M+FB5YO57

MOjrTgTDwiwADV8orwAuBMOsU0rfdXwF5dabHM80iM
vyzPLPy/QN/V0bwT06/K2lUtlJir3uYcFnSkuNKsrg1+bpK+bu4ItNA4fXEKAJRIRx3nd2Fy8RKZtvFx

tGisRlBEjXSLmL5+/Zaeynjn5/8t/UNL5W9X/2Bh2KQq+lsM8GsnHUQwArG9XBkWH0JHqPRgUFBCEHYz

Ua63DITi+r7TWTTyzwCwFZpkFI6LqsyKo3Ca0/27bg
bXqxK7Wykirv5Nt03DrFWeQUZy73Gq7h0cEmvKTqey/J7TP2qGg3lDuFLtScTinPWdGj8a1pyPxli9Tm

o7pNJnxVrTqG4uo1vybN/HF5Q87sel7l0ejHTtsS7gfFaTblOamSSvDS8nLrKdDGkSp3v3Hfro5oBNau

3r+AHaRtddkDwIIeqq21l6QIAu33goux496HfFVcwE
c/n8QKU3HAm0m+fNNgs4gJ/NgfuPj969s/JkW3FA6MkqV4U/D0/Js1nFjCJDt3XRhc/gEMgI/mvBXc6f

xUD2Bt82tjUu0QHSDBqMg7XX7k77NeE7jPo67qTDf3Xr6OW5ykY8FHkEXyXzQD7iO9TXgVhUO1KIOHcB

4IjxDhLhIjPrnIEJIbjINZ5D2yhvziOGUlKxgOZYw9
YvizBSs2GsgRpDDLjAgRmsvAXVrE5RCXwdgTh4Igce21kN/CdeGFyE5NZTUAiQDJLMjq4cV0CoYs37TV

Cb9o85qtTGyRl4+3b/vn0Md+ObVcQNfSvWU9oJAmkYMJ41Si44LR8HMgdnXfeTtGe+6dKlUKjHsLAwtC

tC1n5sJkZjdRhnZhw+cBtJv7pl5S5xr8OVtqIB9ABG
kL8hmcIt0+7qY7mawKJX6vTW9KYw88Pfjh/rpGREMrUHEsIyWGAHhFtc5KhAXBqwIzBYJ6fFaEvuhLjH

HtiIfpdWRywRnpn2yLwCfuS0R+inN5dtlRy4qWAZgCy0Fvjblktz8IpZEAFkn7tYrTLr1PvW4j3ChHyG

/5434tgxHHT0xgt+fCg5GMu8E4UO+k/iriFm6BcdjL
p4dq8jEAWgrYSNlcbU423+2cIBOPiSDOZxScvq4YTWXnzFqwueTIpT7CZ7lNe8wWeGYikhs6wAM3PYGz

gXaWFQ37EpdUa51UZnNEXjPg7dfSuVk6gc6iURQAA05WTmRK7SQRThQ1h4j+mX7hie474lsnFdzrR7mR

OQrW+rMKLUk5h03WENrKUInBvU+gcVFzM70tLcd55d
rekdYNLhes61GBFgJ2peeM2mb26TcJ2ZyQBMp2ro1kzshPb+at2oqMZfbmQ2dHnpvRyHDAMwniakQBUL

audR8fGN517wHQOsi0pw/G5sXZaBV9qid3MyZzPomAFe91wEv60Co/eS6aQYw6gu2p4BugXlEInJXGkh

lcxv6VlpzDfSr3Ejy/MCx/mG+gpk5lwZjNgZkj3u7H
2MU6f2ahNZzgzuz262LyGmrPErtfCs6f4SLQ0GNgrMum85zKOfWsWpThLgz0z4Dh289TG96JPae+fMef

rtz7YcYINsv06Rw/X2QSqyNPaSAdpMKQ1HrWeyrCK6kkHjJuf3Rs8fNwFzfXxuRbB6IRt1DJUV5PFnm4

04e8j0DP7kHeq+9QWJiYlSomLIzmPorsWYsAHUYWlu
Rb54mNJUgLifExZKIOY1xeExNEWn0+63zdjIKGhnYN+3V1qYm2k9aK7SR/KJCtm0ff5eH7v1E2+b12/6

pjamy0mwbDdj3u0WUYZGmG+voYnn3fiqdr3c40SYfndPNVABKGCuivsWvk2AvqXa/+zglZavDmw9Yyd7

2IOh5fMJcWj3fBIPmiByA4Zb0mzUpZ4NCnXmn1XV31
VqTHxc/ID+/BEGcWJ1YnXWnQRtYebgu0Q01zK7SvMLoNDon/v3jx/VTXJmRHJNTxJcq4zYv5+zFsa7Un

/RoyfAK+SlZYhElHI+rmhJgY8rcibcPCEr4rgfJBgDvrCQosIebN2tSFzFjRl47uW/+oj7pZ0LDQ7HxD

aCLktYUVk7afD3bzv7SKly1I/HdEuYGIyxWzGSA7Kk
L2QbFqMvpC3eJF/OzsZWUNDtNJ/QoODVHiukJCSSEhmcRzl+/Hjos/eOw3utkgkeTwRTTuIgZYczV8ZL

Rpqvj5I2LCxlYjNe7fj5RR0MEP08dsnpvJvWQjezlu90Js7JhZseN+GZhZjbuhcvdK7+Iz9gX71MOBKE

3qVye709PBoC5exfWY9I+/oAeoWxslNR1hViMkJPDL
mp/lwdmzGrkgLz4Fc50sRn99/5JW65bjIERCh39fVs6rXDWNhWQMHQm1w/b2jhLdbXjdspOpxBUoyPu7

XNsd02VUwaAUEiPcBK/iYm5imSGDjar4ofhO1BGwTKCmpzrrZx5AlLiziklLPONnSpmSc/ra+s42tJH3

9P3+H0WIQByddepgaAanHWEsuSoyKKIPViljnW5gQJ
gYQ+L52NxBLmUVDk/+yX4r3FzcZA4WPQAms33Cm0OAeSVtBFcOLQMzq9HHsVBCv/8CS6V49znjfg8JXy

h9prBlgwZkCg4Nvoi8g9AevhQS/W1sGQqqWdskYkC23EDaI49oj9bZnlFl+nnj+ra42gSlhL5Ll50WAX

crQzO7wi6sTZJrcvVR5R2arjNvSu2bLrCGXEcIAmd/
Nale2OOrN2aygbGE/AnPXsoWcNxgXXUzBwCAp9ie7MFopsTUldy06MG1ykc948znQjQ+fYxoM97jykxC

8zzDV2yWZS9RI210GYUWVLYCtSgrBJjnnivSSnPymBXL5VWIRijlsNpvlPUiL05Ig1RKaXuCz689NS0t

bxYKdjilrrCs7ghpbKEAjQZsNCEnBt5t07/xUJ4JVl
dsm8iVcB+yn14OIux7wpMMIFuftqm4lEufzDa+kZHPdspMFMiMgcMDIzPMZX6fr7/cm0Hti65MhK5+Ej

9le226f1l30+aS7CZZuSxwmPKQKQ5a7VnuXO0ttGwoTGMntjXb7fN3qJfpT8LLhfDi5839Q20w39vWmM

GsTreGgrK1ksris4mGe8o+bXkKBgWUlEjrIaZrUVHZ
3U3woWAtVabXNtVLI6iMrO9GwDeoE3uP4J+jlrPxWDoUcxGRGloV48HTll0FBJc36q8wIngnussXJqy5

JhATHj9zTR0kaq0onp5ceZVT6walXDmc5kPSpthvYpi1JWvHUAPeqEw4gSzrEYOcj/3/6DSk4DJLNqND

gML3nU7GuTICwgmNCsd8gJyWkM1ZtGhVbB9IUP8kqD
x1LXBbHJvE0J9o1UWunLwfEklPjW7dm/4nqXMtUJreBBdUytMoqNtPCBDoP2357UVfFuqeZoTjOLkQKQ

IIXUTy8A5YNNJQYMO5AufSWYJZr5lQXWRfaPASunOXWtYoF3yq0DGCFLFkICM93fIO6lkLQPGAmpp2EZ

Cw9suKKHY6ddYIQTmv78hMpH9AMHZx3qUdv3bdChj8
wdDzKZlOQyt3i8xTmvDwM3WLkAB6XHb5Wh9tyDiCn4caK7QGOAKDn+bt518ZoFZOD78/Ko2pPFaAIMOD

o23d9iRI3STJpwd2JUtfiBJj9ic1fXVneMrbnJfYq73g7ukIJghUJOw0/PG4zEKygJFwlnklOvVW14w3

5e5WkQjty0ExAK07z1mk1FI25wT+SNh/fvw/3YmBif
iWwkd4umod1zNCwRFFCoZwof5hzQd7Vv3qKw92lXiUSkelS+flWSkwvw9/9cVfXixYuysrID+/ebGBgR

bZv5+axjpbdC6v6RNXquQC92Ci3hYd92cdFUioWw/2tEl2x6rAYddqNz+wO2ZmL4+xvqrBBovDQioM40

q7wC61AHngzSNv11TIjElam891Tc5CDab4gZjkVp7y
SpU0F6/EjtQRTq7ZQHcWmWuh++qctuf8Hn21EpYZPiS043+Q6WJHLrRcrg3IgG0mVuoUcVmn6yc/3791

LCuMrrXmaq9o7gqExGTqwOa5UjM+ZY2bzJVBqi0h+nEv0femL43PAuhD7Xjhg3JBsBRbFzWfMRIw9T96

+oP52L8hMDKk9MmneXh01ufLuO11jCxIcgzlI6bXZd
4oJ58/d0iE9j3+XIOcBAtzI1dAiPsaEvqE3m36+v/7gWz9xteM/r9xQt7JzwTm8dE5gqsxRDL8S6IFHC

XI/Trt2LMgzSKN/8Q9XEH7/C5mUWShlTx1baBQd9+6Y5Npp9ADc5qKglNooGTTlO442YGsVSLauXWn57

mKdwHKiIvuta19OBTz0TNxjswyMLNnhro06Bd/vmLW
7Lre2dtFg72SBZF9ak3fupqhJ5An76R3lkj9tfNtuLOImHiMWVMWgVwIYud18ks4cX8Urpuk1OYzGcSk

kD+6iw1BrHBmcf2xC3OPE3EgTiHBf3eY2CeSstE5444HWW0Tygz/KhRgnJpPZDDxinZcLw34cInYPZxc

naJkrnWj693lI8J016qEU/qy7UXmMkbvnQw3hbwuSf
JmGXC10ZZmBYAcgechcZbXnKlwlkYAxRSewBZIOR1cb5esQMEhKTjmcsUiEYHEy0QML6ap7VfBuetZiH

oL4F23jzGi6grLm2zMcSIVg9CyBVp8YZQpAIt3uYWUmVYGKwTsou+wBQREhNs4K0/+RmMEZ6y7+DNNoR

WxpffXvkqHaTqhYVNO2Uv9q6u/Uq6EDnWYUiKKkV9m
7OzniAyk+VIiIiRA/3gQXfqwfupAZXcQHDJcmx2KmV2pHCh1YnYUwSuwe7JpI7Py/4+/yMsM7q88dsMh

66eGC4Ywipd/dhQcls2kPJmVPyKrg22DPMaOaalhXDLYAU0Gdh3oHZs6pSqI7Uj7pnOOTB5PHqJa5VHG

a0khHErLrNWRMazen3HEpo963krbQWyUhnpXFLhRGR
g8GUe6uU9ltl5/BUiOJX7dLZ5j87+fB44IuVMkSavpPeFHhwnjjIX8byXpEcbUodh1mo4x0Iqz7JHyKX

Urjyzdw4T8LYjP/y7VSkGPDvmmilCgrsBLTsBdRrb12UK7RdUwOuHWUCzF1QppZi4UW+Uxg3xSArXAPl

q+Raop7nHxvv3hhUQuIgyrAUmuFvyrRfHUPptbOV/t
fzRz7DBml2g3vz8cMK6iBhuuMNiuJdnypu69IqoExnzNcm2o1N7RbOB88Yq1ulzCx66QYtXlPWYAqHIY

n0DE8Bd/qL3LIQC/r7V3iBsiNQ7FmLB1eXr2rqbtLod35EMsRRDoD5vpPz0dI5J+QzQMe834fOJWNXNb

dwzjSutZYk1eOA/W235EMYa5NBJDwU2uPB4YBW6OWZ
FAzbfoKb2SZZR5Nzem+bmPqgba27N7xKz4LY8p6+7OQbFHy7jkZsm4/70A+Holkc29ZjetQEOWrf7pZy

M96qN77Cxp1eEEJwZpcDnwwLnZW7YlFCmaZpWXfgrPHE6Mqj+/f2Im+8aiGKpTenPl4OD25xT0ueGSp9

EL15/OrQsCTi0Y+oCKTMwBrunxLdjcoZ3lbcGVtGHS
bd4g00pZ5SXPlT7AkLiE+sF0wyIl71E5wuMz1mESvNj/J55S3AoWnv+4fBugG536h1hsAAR7QmpdB7dR

YR2ibfGrcIIPwvAT4qeBpL702N3h/OLPN05LY00lJs2HeSfeB74IVPT5ZKqp6VIX+Pg1Ij4xgnGgMSHB

GMvxlPhCcuPy0bXhaMXyNc0stkazWJczLeT957bnJN
odc2Vj4T9NY0f+9iF4293aztuH+acw4vwf06irMzRMWpyLQDv8cBFW3u/bkaZ5RPvmk13GW/f/4cdzou

R0flhvG583PxmN6WQL5//tfsPLhBslsdR37v6cbK1lOGh0tlVPEU6wlynD9192hEgPT6yYmIivMbBb8s

erW8lwUMHiOm8uEU3py0l+iUOjuVhRoL3F2pRI73jw
99o7oSvNhRJXGoC8Hr3r97hASQRxftk+8wrAjv0homLYA0q8x7o4UC0IxzeOMRu4HmbOK7TDHj6ou8lI

DrnDBiAzku1dOuAfV/MHmqZKr5IUQD0LOwb8XV3WVXnGjUW1pwaxogyYBEa9A7W0d1P24U/oeGhiYnJ9

OaqgDWvAdwZwYs3iSb7FpLg5lpjpqBNWv90kRY/bn6
cVunBXCPqkDa5wotp1hvw6ZHKZXIrFOAvQgiyvntqmh86dZmj+jseQhjpJlKhpwBVWnGbNjcDax8WAsx

x4VSpPDay6EoucTuzW4DkUy56OTwAoyo9iMRqN/EE1KCadDMof+tq8KOdmN/R0mCx4n0piGo2+njJ4cO

ORf6tF58kL21t/8eug/ePqFFQWb/A2gZZWTts0nFlL
YBPaAHeb7ZDd3m6Le4Z+LG0D+RTFOwfDMbC10DK8In/o3Zk/9/4Lnfzrtx3FscHRcmltrZShcKEGILfs

3ZGxmUGlw4DQr1eecPCPdX/ptcqxkmUGm2ahPqHlCA6A144Jkd7qDkcLqIYB9SxtNaohcKOox/v0EvKs

fxxlZTbi696Xu7mxH25KqECyQlJmZKCwgy/OWgEQzO
od6ke+eSQD5s7UUQtALBpXrubmEGJ/7TNAsa9oxkHWWhoShqWF1+/pgbOcumwQIL/6XeauaH7o1/fuTl

8cOLHr3/J8yV9mVw35wQO7HllQkoPszBGcci/ItEyScNSIlOB7JEWG3bGPRxZmfXoQnxVaJIRI0ChsPt

HI6h7IP3a2omjRXC/rrov3eR5azEuJVzeidM2jAUNM
/PvJqppKhU+IvRrvFgOv979oGVc+LPipH4MeIFZnguFT7o2Zn4vUSpU4jXqhaF/Ee2iqXbFBJBNoOfiM

jBtECYStH6HAtycKU/J2vtN2XfmVg7Fx7uM2dKFFugweNcr0KKFb3+4wVqbPQWlijC6mFJMQg4i700hz

oB6KugBJn4zAb8pd9BciRXG/9h3Lh+/tuu3sCrJS1y
m9JSU+n225iUXDUXLHCLe61a4W44RLjdwSTHNXARqj/D/xRyxKgvNgCcSGV3caXzcDkjhyGhYiaUGEIv

PZJxDupQERLvThGIZD0kp7MeMpP9DIGhi2RbDkz5WN3YRF2ghCkaIEphRDTHIP4OyVj9NBDfB6BaRPRj

ISSrs8MqVR4+YfD3uhZxaMb9rTOSNL/iSBS+8yvesV
xPQ9hdGdyRXDn3XNJOAtPb3H7OgDOjseygu9Nxu22z9SdTRGCqQIbzJOQe1w8zd6a91kVj+Cgv66gSWZ

23rdJRSUPUZKXKglmRTv4Ls28+ynUZUnmvTIUIFaxDkNBJBnqFRqhyRdNxHNZR0wnpNRJSJSNF00sVX6

xWHYbxTmozzA1vfGTge2Cd10314mjRVvpHN1nziB3K
4nZqUqg9f1Knh6j+eKcc4TI7DnIBhTLMoYphvHba3T3DP2VdZxvON3Q7p6+g4dgOxFfBlcREGWFbWqzL

6MD277mT5CdvuNcKASHQUTtohNfHmch7QDQy9Mm5ZLRe1E9gFZxI4Gzt7nnO8yhhpntGc4CvVUsiveuo

P+EGg377sg5N2vdooK/xoBsS+8zNvhFrxY4NLfolhk
lgdrDoOiQwU+jhcoV4TROLkkK3xKce/HxTenMtYHL0zYnj61c14Waqk4j+04N4ehD2jhc3cB/A3Kg3W4

WRQgJq3QxDldYpgT3Uw1QmklvRSZSVx+2OKUbPOUYEsaFcX/K4VczPHA1gsYoW9piTGyMkmNmzpqVbJW

KuWjcAQzq7NBWcH245SJdMoQhiB59e2k+/Bzbbgj8T
1YQn37Wi9UCgADGT2X1yPzZsDH0PFIeRFv226WI4rniNE5i10QHxKe2qzv2s7QBg2QoVNSZaPpzxpasB

f+19/4liIQo+bYeZmPmOCqASUfz7f7vfpI+Rk4O1BnOmj5M5haoRzDVLiJRtt7NZB3UDW0uwIDOkLdZB

nolvdFJD7YbNs4ENGmoNEMI9UiVO0WT6RUGjCXXQqu
R5Fp+Bq+CDuWbpHYqPVayNdTIrUyd7LivrNCkZvjgJg6LolWJyDVSZzcRpC3KKaNAQGTj3OowwF+uiiL

N6md4GEebcWXQVZZHyq0RC1I/AlKDgePQpyMPxi3dFGoKQSWYo0ZvA8TEpavT3LsJEyQbQlT12svPPYZ

P5rKsbUKUSxgVtjHILl5ghvvchxwTWksTpE4h2qBhb
GJ3hgjbmRYLcZIpoqEK2pE7fT5T3vmJf8zfLxvRTBBSXijy1HGIzIBLGJcXI80f4phQvaEeDPcdG3QpA

8beaE/t1+jfbvuoGhwNOfBrc5tRMdJrMgJIMGmtvJ21Ama2t1KGGVYwKmMcW64zCGjYVM+nAsQziaDgC

A9ZXdn1ZuAfsP6muUaujgnkebR1MKSHNLjet3NJGsb
ZhjMs+kPI6OCU5ZUt7KGKt1EHF/6XC+Dv3WW0h7HxGYHQ9VzNnWSA6Of6J6ULtFlXSQ5Qt1ZltVI4sUM

YNg6iv4MsdEYZmXDtTrEVVQKxSw+Lwg7xmwEeLsCtOMwNVjEJ7mlgXznaZ1D4PQ/92OIGBe+MP/cnUG3

yud2sKTSzgSpG73CRzmi7sE7eUO8MlN/hkWSYV95VR
ydZyYjyncUTJQ0ic+bKsk0dCL+lDkmG/Rx8fhqJPo1hXMX3mcNKuTSM7Z5diwDuG7ZTtNr2fA1Tp9+PG

WrTcwXQAY+/O9+30ey2qRcqstNDz6hyifJtbKfCIOO/V2EkAGm4V006KrPdec/5tq8ILVP/MJ0724urS

ArcoXl8l/VOCs3uIn4I5lR0OcLFFq0NlqofRChjDAd
GGqX/amK0Y7jPJi4oE9Zpysn+f+5xgGZuKxTBG67Kmg5FseccR3Fyzkc9PUTr++zqwIw9P2eX44pr/1o

9q3WlMTkdI8yIZsPkL8GZYAKdnC3JTCpipAgTIO3q2L9K1vqdHbeusMIh9gl40HmcH1nGuMyVYY1u2oq

9Hljn7sLSe8hsx/IqxDcX/she/hE+H40cOZRDJKoo5
U19+HQM/Yhv6jVjWFDqx56YKAMsSkbEc7pKXmXNFq5JT0JyTwKngGS1HnKbgTMUy275/ADXgMkbKEE8t

a8EsTTZpKlFdQA7uzjmfLsZuRU5ppalwKZV1PwPbNN6iotuzOMRgMT6nidw7IDoeJV4KZGAjhAXlMNSz

ZfBlYUCIPQQxZPyqANSrLQDdAUixMZ4JMHRcMVXiyS
SmAEEqLDLiEzY9VDLfErSeF2Nfg1EHv8hmKdEbLSW9HZEyNiozXGvtEI8ZqJJrDKKqbDGfXFVqURZePo

U5WHMzTtZyP2MxjY9Bo2ysMcQaWIR2MHDoWrtsPYohYI8NWJBtexLjWNFcSSRSHNMpH74wkHYozWSwFB

YgMCBSCj4+KdAlND7hvtzaGJRzEC9apri1CLWdNKXt
B0UzGHRfBKXwFwBzYjopwLJhLJ1XlFC2DRGvF68dFVB+LpnlxCAbJG7OvZvsNqvw4qkYqwfLbSjRvcYf

ZmtXlWtEQB4eX1V6E3ohUpOM5Bqn97MEtrif6fiqaYnJrGY9QntVSGhFg9j+l3n92KXS2ipwpJc3c3o0

5Lh1zmTyUNdLDJuFiKjQlxERU6ywH0WD8cTqeKXyh5
OFsKKrTCEteGMX0xCxNePlxbL6YcDNxTBTDJUWa2jfP0kCrcu03lmPG160J//Znk/RorgJqp0frZyjlr

pLqpOdoJuy4BnW+IkSjvpguBwotPZbAGcxMKHzucZzIPmvQ1aDhHJP/oFDGO7f5Y3u14oYOXMYRTijux

7btgVUAVDHPh/Aa+tDxGG9M4GuePYeFHoK6FTcP8eT
QlsIel0NbPdAzUjA5H4R+MlB3gow7uOGVgiUZsYqm78c+Uk1eLtkMFvKEEygddaOYafffJ8VNGQJbo8U

RTMfCLM36S5kMXEF3WPkZpsNKOanxC6/e1bCXutDdZfNbPjoBLvVnz3dYsrTcSX8d2dlk7eOoun0F6n/

2P3LREbNtLqNhnrqM8Q1fUJGy61jNHjUxMuwJqy5G4
pwxqNUAgEY329HQ1ZWMZRZRdf3V0pGWepLem6K/dfx0FOJsDRU5WX0XL7GyxtOdvowio2nmTVWhvX04v

1IrJ39WKn697fc2D0y+1DPy9IwFzCTJUHSHESL5/YFYKX5qN7px/LurHgH4Y+a/57V25gFPOtrVzXryf

Y74RmCk2Gs0OplJ54z7bO9MUSLH1dbTPiMEhm4wUWC
25ApvqlPuRFcj3AKAOro8Zi1n1Zmw/4yzQ2j5XW8j+I7nu18w6u6f/2Zm99cJ89xyPTd49ZZ2W2bkO3v

MEaWF9J5NkTZW7hD44OzRu+br1uGPCegLSM+IfBOr+dleb1jJ1E/b4XAlEuKk03Exk3+3qtMestzH+C+

1+h8Za33GtXzRdw9WU/7E5mM/SeBo3wMYG0mx2Jdmu
/gZHGi3dr7NKpDJkwo9rgi0GaSzSpRdF0RxTnXzDigGaGctXnTsNyNzfNgwf2IV9qPK5yRPAUhx4S9r6

Racq0hbJfD4SUgHpxYBVuukPPi/Tu1VhAcTkGbSxSlzJlW4JzuGR4RIJCShKP9u3V8OXtFJ4O/tVlBLo

AHlRsJSrwSgbW3v3hMROKtSuPuqa3qllwtMOUZR/DK
FdrjS8yQTOBdBqMzlLCFH3lFd9vUAikaPgoZPSFhHbaOaNIazF+g1kgXN7m7l/GbTFDRWSJxyiofhTiB

1U2IVz8qOOnAWBGa9iUZzGB/DIHvoUhsM0Z1Z6nakPNO9cutgno8CNjGU7AIgmzfBT+jOIxfcBPkQIao

qZmf4R/8NAx+pcVLy58SVxcvA0VjD+QPu85b81ufYe
7DZVrQqeAh/bnmjALWDwLFKpZRQrYkveRjXNRhinh9HapBavXjXCxlR0AYvOVyUfuDmPcju/SDgfvNc7

8E6aIOX8IA7ebx1g7icpGJMT5m0CkGDKRM0BPpyVMZ0mOt+9wLbhafcrE8U55kDFur7Z/r70tdfB5XsF

bpZJmmaEH+IZ6TvYHtu3CZQWzMYYhC3B6m8hRFCGjp
0kUk/va92vqTkK/uxbV6nYyKNpH8iQYAsEGG2c8orNLbVafvD44qzrI+kswqs/z5eS50NY26d0XctgYv

mF95bDB+whLZUFeik25bTrw3Hg+qd+RdyV4jJhV65JWiV2Md48CiphYp1ehDaABIpK4uFKqUL3iww5Y1

U1PWTvdmCeEogIzFz2fV0KxZruGFi6xswwRLNNv/6N
61KHVTstjrpz3/PGbMYK5sagUEt8RGQWR7lxttg5BqPIjErr1SaiqUVG1WxlCYThhWQcvDaxbkS4Z7ul

V3jkTEG/Ab5KkKiTsF/tsc7xL6m+mRw8YPN61invuyQ7sq8fiRIIMZGoU5qyS1meQU3lcC6oVTTF+oLz

fScIhi1R0vehl2SzD9vBcvoa/ZlOanULVG/5aF3u5z
2gywLsI+PIOf3BuFkQW2az4qSbCRmV+TwUm1wfkRqLd6a+sdDXtlXspoyDAZQND2sZWWvrfckKaSc03D

+6q2xt+xP/gRKE5VnwurFn2luyCLcrBULlx4jSIZydvWToySmSDZ3i/KrfYZYr/wG67GCRPYt+P/yUle

C77VD6EKZm0rnDZbsX6DcTAOKTroe01ioz6l6aFMu1
OGBhgJWWdrTm3wGl5lOp0H0W/PZOqS/vWcSwHOMYRddujLLuM6BnTtmecr+zOR/mugXKDT1IBEY3qfd0

ZFrOghqa01hLWbgIB/jaa3Kff9NSdEbHKCttCd8gbt/AqNcvv75OxaZ59u/Ava1rc3sjGHbzUp3ZYFi5

3vg3yv32YpA/qeieR0u5bhUl428XKuX7Tr3Gclgbkk
sOmbahzTuxmsKdtOY2yHvvHVKieEjNM/wj7T11gZrNNT1yQAo/NGNfaULm3jS/CimR/md/xqdYH5Yj0Y

+ix2DsW7X10jSMy7Wfhk6NJCwWlCeSbkF9Y9hHG6JaDENgDh5fIkruwatIn3dCnjKY3X76AyV0cSi6tI

3m8h0O4dZyNo5RfHJncpW99UWwr7e72oWfkRV/nMyt
27jMCyYL8RwHVi3u1YyjcqQNld127fFHGkShkonNWGXLMjrGxiKQZkuG/vh37YhxGL3qYnemf67kg1oG

Iwsk51/XKb6Jb51gNXPm5CPgRh1xlom1x6vtn9LwheZFS+CjgM+jOmAy13fnY9sTJ0cAE1783GJ4P0Zo

bKINV5TLO1hvWDBDoMXwUFJPcdGinH3G7D47FWIi/y
GCXs0ACsKeM61AIMpuU1N7x/ZgCp7gcenQjeo+ySW+opXQbS1AiSOUZtlSXlH818Zot8Bg+XxQi5041o

Gi5z8iFKtSSbmgk6WG0tUlvQw4YlhCAp0GtQiCzqB/kW17e+JQeH7Nh1KIuiF7jmz3zTCOjGKV7FaJiy

lzJ0ReU1su63YYMFcTnJOPCitqDlzwjDHTRYHXLV5q
euZWpRu5Fckt6de/Yk/Z57QZFEeMDs2KZHHDbQzbkjEgEJd3r22kNpu8uTMccG3mNaF1CXnK+gr2GVW5

TDQ1wIR22/yLI/Mw2EvmbeYu630dtU/jBoox06UmVhVFB8drCgpAkhshOeBuzZYlUhOOXtCzaENrNkLJ

zpirCyYukYVKdqCFVdOsnKOCeOBBQpExDfw3KaT8Zb
XBBtFVLUVjQcJ0S6lPPpX9KrC7WXDBWmYLEpdNJNn2xlWbVmHVP4JZGvUkpqRXfiQI4Oxc4fOr62FGrx

ZGJjAwWfNBw2Au6HUGBfMhimEWZRw9otAjYfHKG4ONEeZvsbSDodYA9MbjisUe65HRbmMDFqYqAaAQm6

Eb5NKCUfUXDoMY43JPMpENBFZlKfH6RxlaZqpdAwNG
M9XINzUjk+MylneyOvHcyIGsJqQFHaSioXNFwpC4xiago0rQDpAlNcUOYiT0AbcXWftuIvXsibnISLNT

OfEAEaSi4Un3ZpMUQsLmxbgUiOt+XUXb2xJ/UeilfOpzTu9FPRvbKqXaW4ga3oojx7XKeewU2n3FFsK/

PmU1u9kRNV6SmmafIyUrnE/AHdPytJp9EP/67lh+Vo
SLJpR8c7CeoeUG5URaGX3t7nGk99jmPTbgkXU6LsNwILBIEAKOR4Gzi3HeTdqTIDXzQc9yqr7K7LpqGz

a6adQGmtzY5IEICccRoTITgelm/Kfwf+5tw3hpCBMhOJd1wp7KiLG08S0285edK9YY03nOccADbGBUdB

ytAVZVITUdTN8B6/vDTQ1Pr7+0+Y/6dp9o4RBkYhQb
6tzOiBnoL1NhoPvXE2+Qgki8SPAwPwWGbLlKqLfZyAhjX2FYVoVruCDD4YqViPk3WPFP98+PaCdm+M+m

VbGjNXqf0GlDvf/etu0GnrGxwV7vPY9j5ZYfCtzKVQveeYqnj8ED9SwhGiIqaUve7dIJgoJOJQyELMma

s55gB2fzN/vhIIPfui2E2ljqP6g7TpH2lKj1Y9n2do
w8y62BmSP4yhO7IbMFA2JUy6ww1FWZLXdP/RoqICC5cuxGiYh7zUGfcJP51wZjfUa7hddsEBYMGnRRWY

dEsMxOAiofGeJNa7a5sjJogCBTa+EFZzrntGDPAuF8hxoYpSA/j2vuej6F0z6B8EZeKYlb8ZTSlLKZTM

vPoJODHMDHwZJT2E7GX5WZz5XCwIsdXOlpe/CV87aQ
p0nlBDfwVDKCMjwIpIYQ43u/yJ20Lc+URaREawSRqNHXVQdaxlorouD41Yt1fnaW4faR3PxrF+XzxmoK

mxW8gQape1l0QVGjgVSWo0RJlkzuVjW5CKwvaTk5L3RUG6fLxNBQ9G64w1qlThhl6VU7Mvm9aomyu4v2

qBUsXHLv1XQHrnYvB6+xhf4/4tDNOlH/r5bLQxHZt3
7zGPwHhvZdUInrlvY8XsXsbAUknpDkc4oKpwtMxnfXi9vQLkdvVS1/lFP46JucKVUXvbErpGBhaSG7yg

GR8g0hkggH+Bi+X0JIhA7OFIsM9f2xyUiUsiTs9o6RnD4veGUeh4eEAkkuGQYTYUYWOZjjDsA7Mm7lBy

2ZGt565n+YAh8I1RiANN7uG9W142C+gbkBXGGlWIjW
fJjjou65pimbZ56afoIndoVjdSAz3BA8GTJ31CuDQLYYY/GKL6OanvLyJkZd++wfj+8/zupC/KCDKxhB

NeLtysDvP6ipkEZtS3N8HutSNABPQ73NGqTxA7f8opQfFXFXlTTsJSw4Lk0sZRmcYBJiO+HDPxFLGsjR

TqXV2FgFOpJdhu5Vj6SN3957vOTh0ORP8TgNbdOiKt
aEOMKB5UwNOxI52TlszyItBZ4SWWaW0chEMsEWyJvHrq76m1qxZgs/5B2TI3Zbrc+Rb9f8ZtI/Oh97s7

6dEGdcuXqKm5lroDs4aAP5rIQ4D+7LEtc3eALJoJ0PGTXapBVX2cjbVk8aYgCrV03DYqg3ByCY0McwaL

TV1BBkxqHcZ3KVxrwmxG25Au1Cr4g3WLzjG9pmqJvb
TfYFiWaZU08nGnp1F31xLvsv7ns9VjPeEhLcpPglW2xFCKW5Da6/r7eRccc5DNe9YvhYpW7mf06q7Lkl

iJz3MRmj3wmtefQVrbmpvIlkxGHI+5t89RGzvqD4g4lWNFC4zpo3MLijL9Q6XUcIobsOzKFb32fSm9Io

p6+1Ujz8mz+8Gdszl5G5C+o+ALjPx7IWZW/CmjaeUg
BvlIOilTyeqP09g6/OfkuQzhvmv7PR6emLcSx2T5eiIR6kkSdr3l1ZoZDXXJiaDoLao7HWYj8XoHrPiv

Q6M99s3nWqYPSVKcWIHQg24G1Q4gE8XfyPyVKQ2ugCFAajQNdMZ/w6vWtrDvqlwu2fu7B4wSC3Ey33AU

izBd17J8ddzrN66euSchBeK2HeoF1sFgSe14KIQygM
6AOjKp8aQjdR6fXRfoWKr6PlF1pTP92oeTF91t8+jHps60IPsKFw3Afyf/qKSda7H1eVBZbjO7eG8zRx

yB3c4rVgAAUVBgQR2CFm3Ue4YHDqHeCmqQsIN13o2nu3Rkc/JCRpwf9+Harry+Ubl7NbilnWvkr9lt1Y8a

xpwhDo2foWGdZmY/a43vVXNs9ifRKikaFYTyzwqUFh
Bu+O+WH30Q+CZYpYtvR3yeT4Yb6d1QF+jEOqZITvSwGhg494ywu26uFGjYlKWkV1MsOZsce+TeZQt5ru

shdEUJGgeueo5nJKQ9sTtabnwBxMVYMxLrehbkgp4DrXxo7NJE/ExaLsrPMIW8+97PJy5osoAnPwvCB1

3NrP50Xu+m+RBrm8+QQsOCn8Fr8qyubYPkop0Lk7dl
WrovMZdoi2rcLJu7zaallP7UoxncFeB82JCUvWnNOOTXCmn57o1m6cglY8XYGJboDveD/l6sOGciDBMX

wJgJq01oYIANsuTSae7mygPQEkrHRW1ij3vndgRlbknyGGU2BzuQwAb1mfKGyGFqOMMIGPfZxhUSvCxz

oMZBLcAVG9mSs1VazOugUxWJxnFOi/S8/GRAWY1BLo
J9L6gZm6AKA4NZ5LQZksUNvo5ffCWhHimOqCA7LM1oMCaQ0mOnv4fbv7+kjOT6LUFMSdmzv37Wa9KjNc

Lx0NbICJI4hjuzgFrsUHedV2HUwFCp51akg9DnGPWGy6BvyfTm1MClEgCHcfL3CxtNu9DQRpcFXEBqjG

ncjuVwO1vrJwq0q+2OsuIxR3XSPsC3usD6BWFSuYNr
CVH4Jd48XGdjNtLOk3hwmInl3DypkS+JR0OJ2ITN+KBUYKIm6vGbabkmj8GwpGxai39BtolfJud42OyH

rGPdiAd2nSxYIT7pWU2TvtCpvFni6fa7WLmKgHdW5y8poW6HpmUakloO3lHD5fdYPd91b58Nq9C+l2h6

TK1hQb2Dvj/HAqQeOSsxMOZabSpi2Lviz/S/Hvu+L6
NbVo7qCGzDS7DTE/1UkP9RzWWFlU0d0CbsFvik2hmpP+rA6zXBZmS2X36FXmmiz4MICPtbevY0z/8gBD

L5I5JOTtduYFTetLIR3ZDS9kvi0DGRW5cAWytuqfBNiITALYs0O0X8KCTchgUAHepgL6C4dLlWevtMgT

8Ds+rlT9MbefrUTg/Kkq6RwT92vugjjhKC1sYaUryn
2e7gn031bwaWnKmpvDdXEWbISAb7UpW+z+P/TD8eTob3AcOCsj323JytjJLXaf34j3nRcysP3dVoBS84

oIMt8LvFNvsdK8dQq8s2rVMlWYKT6r9jN822+nM68H+VTtT4F18P/r74m0jlnfCzDknc7GBjn6wyMMba

0bHKtbTxzzREABWzHDt1X0DmEjpwRVSgfI44UoBxUe
CL6tCP1pNI8cZaEhRLmXHVkGdkd9oUyZhbcegcEGCp9ftzG2ni6h9ogq7XO6BjUiPbBvAyYl14qGmF2V

B8/aX9bFuSpguRt7JcnKol8jy4LIoOm4G7aib4oIdGCzwwbpbQQf/y8/rO+9ioJd+8DMNqJhhuB7gBhR

bJtbA/yY9TV3jDoQx5lA0nsa3AafjFMEs91EqtFaZh
DSBxPM9m0//uc3/eXRVGlCZqUmZEV0fgMzwQoifcFjHehMKcCbRRRpEeiTSoC2CBnfkkIyPegWUfOdQJ

CrBfkPCVpRCGKoXuCjn4EgC5VgQPLpWNLWWdJvK3J3jMYpR9HjK6ADBCQvQLJgjUXFv2isFkQlSJJ4YT

TpSdsdZBfsJX2Otx2fHf90GFjgSJZeAaZaOIr8Dg4B
IRAcIkthTSPRm8wjJiOpEAU9YSTfQhwfYSkmNO0VwsqcJf44TBtjSMQpDsWhBRx6Ys7EKAFnNMTeVT84

ZUFzPMPZZwFrW7DgdsDecoUzRLWiCKFyOrc+EdghdoMuFrqYWdTfYCHhBbpXOQaYJXSzWVutSYRdCa6j

kJxdXJ5MfOJ0sDCcGG5NfZBfAHyePB6LLAKfMm4fuT
TaFAelGDMtUr0lMVepWH0NawKrXXxyKfFdO9avUC4nrNTaI66qoM5nMi9+WcPhCH8lxayiUIJjTH7ikx

x4YTgdSY3WrFRnSR6Av504SeKaO2A7QbA5pUGiW6D5qTVgIjGiT1Azu2JEw115EQ8ZCTn8QTBsD1BhQb

8yJZvbXA4BibOrBFhqRlEhB6niRA0hrPUrO00vgH1v
Cj4+OwVqBE1sjcdgSbXtPJ2wtiu5BRxxLF3WdPCrYL2Zz581ZxAfD9F3PxE7oUAgN8S8eNQtChZzS8Po

r6EIc222BZ1OfK4ghi5Lw46dzdhmTH2DqsBoBAarQiKzW4kwGM7idMPoZ58kuP9uZx6+ScHvQI5micgt

VZCvc1LdJcb2SK8ObHCbRS6JUVlfkywzH210azInBy
zaP4zhisLcOJhiIYNGDDKgIKSjFiXzXDZdHJWhGNI+HsqsdvYtAwkJJlEkJB1ubve5MOasFF6RkUOyLA

3VWJMiuI6eDlVwC7KdU5IzMCRdLXJDFuAwQ3hzwwvuIPjwaT4xtt54tfvVCGDnFMM9GEBgdAWdKMLvOH

TWNIHpTWYkODgjAgFvxiMoZFOaVGYYAx3GPU9hu8Dn
XdGqTRTyJouYVNdVZYQnSz7vzYW2BPagRP6MKpXvNDYnWZXVFYXyZqJgNhDoXSGSYrMpE4F4XGE7TMJr

Osmani+XwigVR2Fct9jY9H6AQkoDFYBTO8UjZLySUOzQ1qfZWisPnIsLOM5kC1FMHCxEQ2nguTnoOR6AKGl

YD9nDQHzONQvWgLhVS3mRYM0DAFfPfL+AazeYK6Fw6
zywsTqLTDzWVu0ZM6OVQCyuOw6XfnYWOksRFSBUO3+Cj4+DfOrLM9pqstxVAHjID3prrc1WBGyRaYtvx

JhFJL5NXPwIhFbHEY+BpEyOVr4OU0QXI8TJR7ZqLAiZF2+XSA+LbjzizEiWkiShXCoAjuoTWS7OsTyHT

UuBJOxBMVrOuT7FoAaKbZZBJSjATWuEbq1BEGhJYXy
YCRrFKfbKGWpZUY1EKZ6GZDvRQWjUR2kJtQbGBCdCnopNAHwXDHcWPXssuFXHCSbJNUzBFHxFSRvTLXv

FGAjTWakGYUqCBKtEYC4YHUlCUTdRG1iLjOpQGLeAHS0KZUxGKCiSUQwrzGMBMXnSZCaNnlhZrQpIGOa

WDOtIOtoKSHfTCLsJjK3NRSzYZJkDP3oUwPpIAJgBF
M6VEpsSMIeVOHalhSLVKTyYRJcTHqtKHRxTPExLUHhBXgmXHChSOS3MdxiSDChQYIgPN2nKrDyXZNzLS

F6KtmtASHxJHWokpUKMVFbFABiIcx8VSIzOHEpXNSdKYaoGGUxNCNiIVw7YAJlOULdZB1gOtWoODXuTk

OiCBbpIWHwPXDlawXIRUGgZBOvYAC0FNIuAERlZNRg
ISalVFPnLKLwUPyoGDVgXQJbIB6aSoSbUBAdSfQ8RHkpPYJdCHPguzASJRTgKGKoWpCnHoVhHPNiPRZb

ZRpnYTIoHUM6CKo8MBAsKIYbNU2mEwMrUHZwKso5MnvcYQRgRUPpshGORWNtKDMnPBejJPToVHSgZSPa

ZDesVRZsJJM7AVF4RITfOOXrTS7ePgIkFIYbVbs5Hs
loIKTpUOGmnkFLLGPvFOWhIlp0DzQgUWToUYGkEFyuYEBsJXH0NTb5IJZpNSIwSF0eHpAcNTouEKJXIS

gLOSTrXh6ffXQsBZXhPoquHV9VdvBlVNWpAEPWVhCwQ8uNFAr1FlQKNzX9YHLQTBmLPAIEAhPBPQv7PO

ScHDFaJLH4ZzW+SIhYT9SCNuE7DWDBOGM7W0F1QbT1
Ypa0JDZvLbFRJoEbNM4yRhHpK4QcqwRaQgwLNz7Ut6InjoW3efEdArZ9QSV0ArKeMJ9TEg==









                    ID                  Date                Data Source

 

                    [id number]     2020 01:24:54 PM EDT [facility]  

             [address] [place] [address]                TELEPHONE [phone] 
________________________________________________________________________        
      RADIOLOGY DEPARTMENT 
________________________________________________________________________________

______   Name:               [facility] 
#:     [id number] :                PATBDAY                                   
Ordering Physician: DEMETRIS SCHOFIELD Sex:                M                            
             Date:                  9/15/20 Admission Type:     E/R             
                          X-ray Number:       [id number]  
________________________________________________________________________________

________________  **Unsigned Transcriptions are preliminary reports and do not 
represent a                     Medical or Legal Document** 
________________________________________________________________________________
________________  \CTNo\  EKG TRACING ONLY W/O REPORT [id number]             
COMPLETE:09/15/20 15:56 LEI [id number] (REASON FOR PROCED: ARRHYTHMIA   \CTNx\     
SEE SCANNED EKG REPORT     Dictating Initials: [facility] Transcribe Date: 20 
13:24 Transcribe Initials: LI   









          Name      Value     Range     Interpretation Code Description Data Josefina

rce(s) Supporting 

Document(s)

 

                                                                       









                    ID                  Date                Data Source

 

                    [id number]          2020 05:01:33 PM EDT [facility]









          Name      Value     Range     Interpretation Code Description Data Josefina

rce(s) Supporting 

Document(s)

 

          ER Note                                           [facility] [place] [facility]\""

l 

XERFSp9xHjCRVrbakS7KAHUpJG8ljqv7KAuaW1UuHMRfbpUtUZIgG4rjPABNJNUQAUXvdV7gNYIfSuvL

vYm
aFLSyJHESrDhLoMaEuF7pmezp9aEB3AVBeLrysQZ5ZsPj3INIdX6HaPSPkUOVri2XoJt1+ZfB9rvQapO

f3eD7TN9XSMPgW11lfPf9bQAYexTlxcXTf4eFYvsbAi3ZiosfCGENfZJFBAEyOZDUCW25NSAVTDdNUAC

qvzb3um0HjIa2oIfrB79/ZrfmxtVVb+2f7/Cdpp7FR
kNOul5b8hjwNeYrcnhTTQGkJeATZ5rl07gCIdqVNfKcBZ7yMOCkJHl3WZwS69qLSokRxUR+X15TNHtgu

6wjWc8+Chente+8Yefv0G7/Haiecvf6/8uoMyZCfePwMiAcVsEMhqRs21nCPqAFamJEpp2LCDYLLc8cCa2

8BTHqB5qgC+vWwEhw1i2SjZlq4/EupZDRlEVWY5dpP
IcJYxRLLtyxmGPfY0daKlzmzk9dHvJC8MdE1cIeEL/nBoPu3VNP+TeOi9eX/95/FMvVPLqy/+vN/gf9x

GcnW/31yMgUEU7bu0y31nJCE6IOJO2M39pTTFjCQQ35h3GuzzZCMugYTXMUHg2wY143VseRE/IgAakgD

lJJxxCSbhHA0JJfDAnhFMqlI1Lw51FTqWVVlYQbDGA
jpNQUTCDsSCODpwZH7oPsiFs4QHApQQeTxiA43VtiQJ8AF7jzOTiObyNVGMYGHJJugUGLEEJFqYIfAPU

3Zh6SL7PWWSTLDPuJTQOwhOWNHLWFG4XZ63AF4U57Zi8AwmBUcKC0Ji7F/QJRsBEmAbLwWqwHsyEbWF3

2A/eAkfBSXAWnA/vptvuKfq57DoliW/DozAffgUvIg
SViREmVXE1MJwVHhRAASNIOxiKdXxRCNOhZhXO7MQBML2x+Kb0vFwvIkjNOiY3YFonL8VgoAFgQSfuy8

HVyFPITmQ/8x3kWngG/HinaONM8qolkWgxNPILIyqZhGzYQpkP7RDSCNid0L4UKmEF5lrRaOv1PU7Tgy

YXo4+g53BH7qDxFfZeZxWJbwqkZAFnbFJESgYVR7G5
koaOSpMEQu3xHIqHlJYDJVoJ6HCwvHEIMvmnmvA5k53JwiDTvsE1Tedoq0npgNWjfeP1sxJ8TymITl0t

iffDx+Q65oonjuzq+Do7fiDZQIjlSpQylIJ8OToAPyc4QTWHs9JUGCuyWuyvltV8H29KIaFfHw+SSCQ1

uq5flZRB0z0aKm0nULU8TYWS3Jc1uxYKqlEoNShUAy
Mco4CbQuXmqRg3q3aJQoc+L62XsIcsxntMvwtnQS4LWS55GgoeRfKjMZCLJeDbHsdKkxf3P6GZ7YnJYP

Bxl1IfxvFwENVSmec6FFB7M7UWat67XSBV2DbppYgqQf9S2oDdPXgskmLsWW4rFfS+KPmAS8OT7Y51tT

oJ/Wl7wG8UUp9MNoYuuX+iTWJEYklSRtJGkiNZKNku
GNn5SEzb3BeNU9CFubfhyVVUDhrrZsvb+np5Jaw4TOjBdVoCzqAocTvaCWkFR6ZRCfx9hRfs4NAerHpv

FRFqyteJnGd0dpzCsTr0tCov/JwCVcFKIUahXOGSwkuGOMOWEc+vCQRjDqXwBH2KHmBhHYiOa3SEofqT

2wKtkD0g02ILtzKJ4xj5qrerZggqPliqlYkUOXKtQG
Vj2cUqB8uYmsqczEu63atBPsGv0E1Yr7Le3Y3qvZTgCLM2YnOsvQL1iRdardn661fSVeTr3Im9Nvy7TK

2X8SoaS7xJ81IevIOb2qAzL1cu1lagfUf22flz4rW6tJD2w9Yw33eeenye9WdB+6pvrB+v36g/YUAxcD

PIM+gx+TJRt4KqYQI1UP2bihP10VZt173JcPgwuW1f
ZDBtZxwd5pBaY/7tFqmPG0ndgNJFGB0gcRRjZ5LE1pqz5m2mkWeqUc0NCgID1nftZkIvqy+g5ONCo5ea

qG9ivsIyiCFGpLLUIffyvDCgphEDCmKEbi+oiP4z3AS42JmVhh8GFUGfc2fmf2vb1cKtzc7WgdHkvf8h

ekb0FRfBo5YFkqXKW8OL50Eq4EfWueXKE1dCwyCbs0
svEPzPn0qEDIueYxlFgstx44EljjuCr448fvfx94e77f0kHnfZGh/P756ewOxZqeghcVd4xEZCn1cEo3

sh9t0Z0a/7cQ88uZp5Xptg+Qe9Mvaoduu9jKNJsh32wwN/CX8N/1T/nlXqEKhIj5PFmANaBOA/SC9oR9

QeTAwnaEWeTKpTBEmVqKGqd69ASj+YKetDtL4fAu+S
taUmBbpq5CeuxeUzW342FktYBsbXFdW69Jt6UGetrhJtPwDEGe53xBsZzuIYf+j9nwvqlkyyYMIT5IsR

ZdTBqLlo6+iK6PkY+9hpnNwwQoL4dJqhWxOx61pqO+PqU2FGXDawsMIgBFb/u/w0kX9GtmbUTBt2OJMq

W1mXCMicCzWRtPI1Z4C7+pEeTNVI/D91Zy2hbUspX9
pA+rkMsQxuxmCmVua+cFwfp3hB6pipIyFczbOX5hcLYpT27yoMOpD77yVc4+jxBr8u4bKlK5118G77lw

u2LSai5kRf2brHw3+/Ks77quTEHEuJYauVD5923Vnd/z/dC+6szm6v4zAXwdGaGqoul5DTngpkNOdW

+JD9hgn7q/VGJScrQUXcotHTtgfeBUmVhZVtnUQc+D
nuCK8tLyc3n2OkvGCBZIrqf/XJWut1MnbibJvkz43eM5oBNstW8Fx71q6l4leVFvAbDYnC529bqTFyW8

EcZi3FB4p24pt6jNcXZ95dIuBlkUUxfIBO79Q9y3ING0Xsg8rT/le6q/1vR8tSU1jfZGcu5cbQrlgqqy

jw4/seofjFI106zWciRmkNsv/dG210xC89T256io9i
5W/xy3q9eG5Pm6TnKvkGA24tfAd1hBf60a86Us6juK09yCZhZs9SwQr3vHp+/F9257hTQb2JMz8uqgSV

em+mm4DNoRodnt84l/6Lw6zLbGyg7rEyGgrFVH1tkYj+P8b55h8vc3aUB4x6N6nBDI4R2GAFWfpcnzd4

cgzg7iSh6r6YnzQBElczF79z1hL8rg6Zx2EDN/9nA8
dJz/eN7j7dJ7efeU4l5gI+x6gnpS+HO5edYy3OdMi0t+1u775L+dsAhlnE3o+cQUa+rVb8m/dD6Xl1C3

GWVhQLN2bnv1Uj8s6a9FuS+nXyW+Wp4v+M5e06lZKb1//sPmj8GFoIXpN7w3K38Wv5V6e/Kd0bu+Re/F

Z+7S5o8dAX3R+nDqI/MceSmEZgNV3E3axde/aH7p+e
r+1gxMtghJ4GMFCmU9XnJDLF4A6FJLMdV8qGUNhT5epKYzYcnePKhJGnv2Q6iQnj64s3LFOI1PK+CXDY

UQFUKbzzCWviPCARrHuPuGwBR7U9icVyV0TXUSHzmDpwmaLgHJq0mHXDu9C4k9Ly9FYV3zw9CwFNCjGx

PrVF5kszg4JJDdt2KbCyF8RyCRNC6fi9ZsErhnPNUt
TtnKJt3LM7CIQJFnLOL9SPQxQm3NCY5zh3UaUgqrPLCqEodBPDaBNLUuYBcjZLUkDJQ0OUPlfMFITVMm

H1TspYjhFQKjOG0ZXqFjR0wmdno6gZU9WEJqXrm+YugljNLeAV4FQO46sNVhd6T6VXInQ3jqZHOhe59o

AJanMCRQJD5qXRYNSXwhRSfyXSF0SeGhdzlqHMIwBw
84iSh4xOAyWBUwFMszsX4qJdc8KvSbe7MdJm6dXi1wjTJhZu5YOIZmZplUAAXxzU0hwbF9ytErSDXiaP

BnLm9hc2x2VbsuXg7rFj0cOUb3JwBxEoYtFEVsAz3kcP05KObzebXlMl4YUCOtPztUTJHzwrefdDkmdx

S1gKvblrkpPf4ujZE3oYzgU7D5wtyvh9QwL8QeP0Kl
YO0mwpQhMdEgCI0qRHVxAwmoGf43wT5zCy6YZBSxAnEazq9dcG0xdLGxyTTudBogkb9uNHN2L9BbWsRy

ik9mvB2NLNZdXqhqntr2YOstLugagA0kvb61mtztYBG6pRBnT9VaQ8E+CjxkYzpkYXRlPjIwMjAtMDkt

AVuMGVz2IZC8EaCmQSE3MSM4D3DjNlWmhPV+Cjwvcm
LsNvNaf0QiiXL0iZ1vVxk7otDqZtYaa9GmsNL8eJ1fCLvcgS8wYhJfUq5pjXE3oQnpJ10nJwFvn7AuLm

SxpO4cPUUwAZ5uWeVrayLkVnQou6N5HEYsZro4vZMzMvRma5O5Y2EhNdVbLYVjEJNOX6ScYmIxs3qxgu

HkDcS1H4FbQzdXwb0qnZGxmg5HDNYkPxwMVJTTRCRm
iV0dOeYwQTenkIAnVeKISjTchfUzn33+SlgkixLkGsFpc6IpbMU6nJ6sNcb0aaPkNoOch9QruXO6rY8s

WKtdgM4qNfkeaB6znBU8vBfbK59uYfUlw3TeAgPwtX83BZQtEX4vXiMpjnCoMiFql9Y7MAFnObi1zL2t

XiQzEJU1m7SFr94wVbVfILQvAGNIZ4NoGlMos7woue
IqIsW1A0welBzUnvKlaF3kGC3sxQ6ZUTihfSrVNNSuJDH4NVUuxAW+AwTbMI7iZW8tW9ReDcccGZjlKh

1dVCrpMOsubZ6hFl2tbMZpIXLbJRS8SI1KQKlmvBpXomSutPAPJOXrIcApNxGoBNxwQUgRSAf7RYN5Nc

WjVBU5NCP9G0mrkYpCsxCyzROQTZFuCne0Z1WmJapK
XCUwbjwxlNwfhl5IRZ5aTFO0YfBKQwk5A7e4uX7dzQH4CV27G5fsGQSnKBQnAQ4nJUAmKc7+CgplbmRz

qLLtIQ2KZG0sh4NgPsbuIWTiHweDKNM0DaSfWI7zjohtWrNsXH0fjrc1BBsyUG8IYI5rBD2DeHJWWRJa

ILxbDOFwHX0fthCbkTpnFB1HJJ9ciZfbNNFeYLNBUf
TnN9ExxOQsvqKyFtgswYCLOQNoIRQFYIRxX4GstVdrKWWtCO0pT0QSZPEbD4efiZikNcXeHxKyJ5zegT

wefDZ2BEhoYE1HoQJuONOmT10khY4mCS18FOaYATPgB60wv1VZsXPqMVOzJEJ0sGWyM6WkcPn+PgpzdH

ThGX6HjNctzABGWVJEurG+fF1ASoTFIBAPADGRARWQ
RJHVRUWEDPEONFPPHFIJRBGzDGDCLDLDDF7ta4KkMGJiLcNsTF6nrhpuUsRuGO2zflw9GXxfdhZjXotM

PYGrRTEbQnwGARjZBQQtWyJqTGKwVL0xHzObR9K3hVZlJ9jSMywxX6YZTDZjJWJrL9XbBKS8OASoNdln

JZ3ObIj8CZPfE2ZnMICvFAGpb3ZeEtUmT2F6ZdL4rX
HlK3dvHGydCfIjH7vxUPUwCGQ0GTzkYL8UWAasuMTlTorTDYDcAoh9l3FxocGxmJTygmUekUN1RdBmS6

TavO4zD4GmX7HkCm3XG4JVQGGuREH8ANNmSn1EOHOvD48am3rkCDUoEWWEQt0+FpO3fkLqcUq1aO5uFw

xV7/29u5283snjs4sLUpR6uHocWkwfRRvUZUNoK9iF
RSqEEkqlvZeGESXKqsjMLP/YULkqmRv9MM0R0/+988911F62cIy89/18Pp/e9tzUyggSLBWKvl+N6urq

zyiampr/sNjJCZVXde11bHH16nqsGnRlyKIQuqvPnhnsd3t27U0zjtPYLEn0HDQVQdN8pHS3DmSZBGB+

ArDTBN8Lp6hANzvz/W5ECrvvpWG0imQ0k4mSkfGbmx
mKODOx9NDolUnfHQwNuETmUw2cNTINZkz/F16/xkC2opcRVg6RZUZKVCi+vDZdB5n7zKhzJHSCcBxXTx

KbHuCCQTSfb0ElnKuf6Eptv2EyjGKWEccmUQBEs0EfGDxPckys8nb/oFuDBHVxp6QahTzk+AnCBNKAT1

VU0kD+IFCHrISkEJnMUlhYYOE/jvr6+btW9DtDDRuv
BSswtmb3nEXOgDocnLSTnWMumEL3iOw+ot57d347Nfpc5NfG4tt2bcxMk05Sj9afPYUdK0bfq9RxoqNa

3699zTjJTiQHUPfW6oL/nXwWWGDhd+Yk4280D+bC2Zz1WRSvCaF+PdOGWTVSwKlX6qVJfZCPl4Fs+BUU

OiBiiXu0MNqKp767AkxpoXXsdfITkJRnirdmPHAKpO
HSKID9XTZmD6ssznkXWone7oO6DASEIdJVBZvrIJ8Dsd24L+eDBRZY+Q5cffarQC7fhTQ1i4ypheVJqL

Y0jo7nNBKY4ITFncRjEu00u1XOtWIduhgTtvgSkEPX/p+caaj3o4yP1LuZfLQNKx1soSG1TQVyjeMTCI

RIBr2npgoUXnQ8/Ypi4irjF/90tlhggYV/Ca0kVbxY
P1+PbSL9rRUzN8SVINULlFVv9CRkdbBDofh2D8koXfLImVZXN2yypjD8+/ny74St5p8deIAbMfg3+Pps

8xo58vdmf/qWBMdtr0WSFBToKSecYMauHDJWlpKBmqkT97et1OeLMTnhy4+vt4/PFu/tAQH5r/L/lTz8

UlRXV1+1hWxi1jQueZjayhp4Idbr75+dEGRKBCzc74
51041dUKYe25dR37+yjPHd+/eoG5XiJ6pLq3sXNLM9AAVT+lO6tAe49+7/azZ9MJLbqb8lrOfon2GaFU

/Tbb58+TZ2SjQJlhzyc0x3UzVvZ16EdeT/DXpJA7+llAKqOxlc6FfpeJRphvZ5YJ5zTsOgSRMKWklCW6

iCgTS+nx50k8LYl6qNB+pk0ZK3fKrkcBmMTBzYHvZ1
A3GrD3SsYLXlvPv8YBits9+yZYa5tCgTh9L/uYJOhOHGQeXAdUuZ7kNFxDeJpkq949hx/o2s/SvdZJ6u

GKtoCygbrlKXwaQ6Jjh9AOQH4VQXAPfdq0EZaJVy9nIhWHV7HCEi6Z/BXu4FKcO34fWoziusJV0KkaFB

V4+HRILm/KcB0nZK7F9qVNHw6hU7N5WjK9+5fftnIi
9+PohkXKAIG3x75pasqEuPchgsCwyTLpBCWntWgJDjLwnwHP4YMuAGjJ74fzgtGSqwEScYA5KUehyQM0

xkL6D9mZAUoKVcA/5ThfFl2BozqjiFZTzekONPSzDmIYDcufehP7NBc5AFjEH5zbf4FtNXTC5caVIMR/

kuPbN0mbMf8tTxusjpKqzc94qpMNZT9iLaRndGwkc7
ROBiqjzt7uEhqb6t7ejybw9aAD0mqGpgZlGb+gt54+fRMZR0H1MSiJSU6PaTCqji8tRJSw4KWNPdlj6h

1qePH/8eHkQMmjsGLAqZYO5w5wb8UNsabrlvWxJeSnYl25L7B6MKIHvvYQ96MkjbmN4/Tm3P6eixLuL7

zTIIEm7lD29/CkhprA3kmhT32rSgAxsBcdcl5H+RwB
1k+qEU2NUgYgFWW9vu4iJj7XvNwDVrXCzcLkV3RLYdp7qgmti//+XRMVoqS514fjRvhkbxLZ+Jd7hHDs

izXFk73ha9Wa73TC0ul3ui8v0gKEw0RQwYdLKpR5UXCxb9TEfPokyOVLrYgoWultKdO2dJp9A83ZsJm9

/ZlbttyYztp80JZuTxWX5hvDKAX59hZl4xFnaO90+X
zR/Gi+eXtjyM6MPaUbsG/4YNTKfNYCMo51RKwf+L79wVKiunBnxSg2t3gqrUyqVOzbGf+Cbty1kzFWg1

LmuKOlbcH0M63mZmWqiS7lAOiY2z2/ogwF3OPRTSikXEG+oo7goDzT8GF+nT8U6kB+lNVPuck2g/XDK7

4XxqHaXsBz61zxVlYGkkWebp/LM9xmrcTg2eYCJk7R
15P7Q7NvzB+WPuEBhcyWOFNQJHPs1digs/TlS2eFztcR/DKyUqxsfLBwodwzBPHz+OMEsCYGlPS4NzkO

P+uAhYkZu4xr59CtBqhcvFubBN+CQG2+Z6NOJQrSDxaS06tHi/xprwmXDAQva48qawgGZ0mCn//iCHWZ

qOSyfw4x94PNFJ25jKtIMsIlYSV5UoGaQRHnY4eBi9
eOGCtroGyhuzf0/dtgnr6GBspkflRi5pizh54GCnY9AkU4P/e/dOgJvEzcb+c1sCsS6KU+sC0bCiv708

QCPm4+Vu7rOoB87pq2++jTpyBGpEXloG8/aoGoTTFALWRZiwUsfeLa35MWmn7bc6NehNDAREUXBj1me9

cxBGSEBgypTJdXV1+L7s2seMY240rKn25lF7P96U3W
c5eqJG2aQgAGHN9K/NR8s8luPEDnjFkH/HUvFjwHhD+vq6E8wwGhvcbIWUy/E3sLKr606g+3b7NfcnF5

bcawfpv5QIvoycTBZCo5qfXxlyuV+8c+fTC3jORfoZhEo+8JGumKS0CXOSjohkJur/7+jSZ2Q11DcJud

GZlJAoOaCNNFcECbV25WAyGzTC1oNiIyL/WXn5+/fv
Pn34BF+/reE9jDbxqPl1Uy25Ap8j9XpA+n7x2V9lSEfMJ+LGL1/sqP4tLRrmwZl76J0vmwzqidc8M7oH

CwXXIDjdq67uWNNuJJQApV/G5lH8tYAsiiDy09ZSngzto0fjeOiDOk4UzmIZarcNbApiGv3lW6JT9uf0

Zbzv5zEuDKMm+2yken51Rp4Fdhj0RZzfpg7v3/LliN
OdaIGWlXRTneuAaDHF5lzWBbyIATtBTro94PUanh6nIa0v3yGeg0R9wkhP0DnAsYFljEQt7Udpk7xZzO

E1lh1OOV8jJLtX7W1tiQK5vEwgCkbYfuOR++6PHn5I00ilgj+wetVQRRwP3vBsXHk5QYVtR3k9OSRT1d

feGeNFkl0mWYKUXdh+lhYkPj4+OcRclFEBKb8lY3iE
PX8+LDQsJDgocldkyrkUqBeaYL+GN8Kv803saQl1uZSYjr4AlvrYGyZImMyMY89x+exZXpQ6+sIbndQB

YQoV1pVY9Etr/3GlRw3eRiIL1AEe21fEv6FXi+vrtavXDuzb/zq/oL6+qwZpeCPryZMB/QZAUU+xn9KO

EW9ZnfwBlSPPQCZEyFm7BLXQtAUv7oTtVjDz1NfG7F
xsiKecvAKIPRBGiJdfD/WR6+KZh31fS6910viXTgZdUjBVKLxLicPU1IHOE3/Go5rAORjSLV5qdIm25l

js8qnr8WF97wziONFVSs1zS5iyXfZ0JwngNeMp7W7k9jcCQEb6EYik+omOGBynG4RcyDXHMOa3myEUH2

sLn7kItqPlNK7+BWmZDevX0/cmLLbAHYFqVKoAiRez
2NZr9vk1glgwr/E0Y006Bt4yt4w8Ws/ekvv9fSfcPTRtwYn98932gEzWkdwdDu5++XILLrhs9Be1F8SD

JuqQRfnTWHc49zURPjwO+it1IlPgMzcqlNMFQbbconAd905itXyg1QD6CV0BxDTX+/dpG7+fr5+UuBRx

CETSFWsBICdRTifGkWrHiUTdyM13m4MxumQ1il57HI
TFb0XWynsKr0uG+aLbgut2TIHF7LAfG5D69nIscIuOFcw6S6WQZIsCJWBxBDnQMANpYoFo2wUeRDhowI

PpaKQ6v75BfchQkobqXGroSe2qYngdJDBss0hilOqVUNdRtoSay17pkncUA43+rmBiJSg9nLoRrBzN7a

djBudeznHMLUoTWhqPDMjGN2TIPVSf4wfTDVp0966l
IyWFzUYBpcCzijJyFoMH4/EfPniIj4+WF8vrrOCpNwQWvqOyNYRxqRvZ+uxZsxrq6/EIBSp8O87I+6tX

rXZxdoY/WNTPJepndbfyOsyalNrvcuGSnXbqgu1r6rdT5GqAdNmHfZMKk+12NTH1kVOqSehHTQqlTaMJ

0y6gQidkMVv4cICKHbR3LKy3tNHjGsoRGlMlVHcfHS
dg7ZpQNSRGxIplDPeP5ipBTupfP61N26wxtm3RMVnw4CQddFzZ2RCW3bDZEKMHrfxBW3i3JPTm5Apadd

lKikpsKHVDZVmPHAnpgTrl4+JGSNLcovBNITH8/pa8TWPjECN6vHErfIRrTp4xPVAMETgRf5JM2Daqi/

f+6Vd2oFlWtxAXvF7g17kIr6tciHSJPUrxntI20Hzq
2Df88Bm+nfA943QTB8yaqoUS44088+9TEpurcGvoumPV4omtSiuzUuMaxnB0oY5lVC1cGN5H3rnEMR7o

R4yC9A/q3x/lGXyYWF0ZhEy88MxUNrpkeJPskncVScEsX1xJO3hX3LaBCVYbTJZe1QBj3bQFAhioNB6C

qhTD4zmPGlQN+sHzDI5mLEJ2lTtK9O0yLZg2AbeuIk
VaQ7YleNB4lqLeqZL3ne1U0WaCai87ooI8La0z2ITZF02MdmCPnLbvmQOrOgSK6h495mDy38gd7bpMjx

ByJJpqRYNwmUvGP9pXt36ybPdFztxAjREPEIYJ7+qTxvr81vuQkT59SzZzyQcQEY3qf4Z2BSWUWSpJTK

LK8Rf3Mu0cOn81vKKNgWWcJeQngGNljivlpwx0DmuB
PkYdyqFcRu4d6BS/32xrQPSOAcBu77qb+CfzeKuGbSn88fHEoiSbWRKP0tT2i3Z4dSNZtulKrrc4D/Dh

w8cNDV+eRCzv0mzifrLrLhNXtna5c5pMAFNJt4uYHXy/88/LhN2giaiQW0e6YvoDPeI9N+7JPsalozH5

I57eQu5cUUQ9+U/hxY/zo6q1uogCE3X3NYAahdlF9t
YgLL7B9iVBYEGlYaIb3Bv+fPECu3/r5k2n+QtA/mNRR89LjG/FFYRz0I5jSZ8Rlv39/vHjB+w+vHfBPE

eoViD/pYsWf+4OdiNP7cWYasJxm+gvJSYetGMnblU+ArGMB01JfKCTLu7uLO4Edg8+fjISXfIGCGaPHz

8+uP+KejODguavjgPzcyl4bLIQnA+0kxPmR14rVnTT
BlAKaq5oZ9WHwX//NnDsZ65iV5WinFz2SaaK3tKADfcAjO8BpHAn3F0VhJtB+FmXmymg7rCl+X2QkJuF

BWZH5dj6soPtCuI6S/6y5bKRJgHw5VXE4y5dzuFNOg40/bpx/XoYjoV4+K6qIzjZ1gJqqrTdL/vB6NzB

Cvz0m02Hkq2qK933nAGEUiu6iKxTIXU7lYg9TeiRJH
72qHQrzZQO11I5U9OSdGzwQb7iZmHwOU0wktr53mhyDrLLPXJUotS9iWiiU2OLcWJvSgx8aUYXuFw3xI

QTQ8T83JAhO5OC5pKgrWasd5Xusq0/wGVB5+Xk5DR//lLGooTp08/H59HdkmgSZ11/xopLUU7+TAKtQW

cm2lMLrVkrTzUEX/YghVtv7Afiy9tLENT1XMFGQIiY
5AK29vsQi4cfdMAnDO9tr6YR8Bak/0H0l2k/LFeUtx6QrUfr3e+F8fQcWAj2J62lrc4Xg/v60Lp/tDQ3

su9wXFZG0XegdTiVWttFrlFjnMa3T2fqwIaVbWs2r6Dzo9gIeFb4TR9TKkAOmYlTqeAPluX7whycFndz

iKmD52se1lE/UvvBcwQMvSvWGUx7r5yQbUUF154uhi
paOPEu7IAFOdnwnwG0FoWsXOwaM9xrG1DGcyYaAUQgjxAm5AKGN1M0d5gKIA3tFrOSw+4wXweQ1wBgL1

+05+bkpuD2A8V5Jf/zKfGzsJ4V3Io06hjmv015NZKkW8wlMc9ASDNMNpvJoWNhOo8w5TfcGJRZ2SAZ1v

0dBUPMBAXInap3+nGP4BlKGRX+5bQuJ8PRXkxQ4aMp
02rI4w305hLfh7Pk3d9zi46Y5yoa1IJPpwtdQJRy6OVbojRggvZW+6gWoDpITFWt7YdR/AIgLQPtgDjd

azPp9rAIyuLpFpa6rjRaouC/qIUAJri1tg5ceI2pXYE5ok/lGpux2lhFhoBZdTSNDLRDodx2XLZ78glW

LBXSb25bUY8Ra5Ev4jQ5HiSDXsp1VO4oHg1thaq90G
kh++PPoF6sRrHJ/CUTY0Ov1LHzFu3nnnsqe0cDAEISgt3i1EYq/nzK+apAd6Lbqkvw4FlynWF8OWZqQ0

IaCpD+FPuAxLISPxm2m73xoe3hzHCLnjon1Ds1emevAPCi/WIUN6etdMvu84pgMZDsr1a/8fzFQNN+8N

SmThL+Gm4wvVtfuZRiVzyEXg6jqnmv689W1tynzqWN
p1yLRiToFKGDcw6jHr1noA2OLEdyYJsoZxX+TUKRWMTGePE5lgnV5IqLHRcH6zfOqpptKQQpiPwRf0w9

uFIFVzPFhIq9bFhp+46eMf59GCUwzL0I+OC6CaXHO8mp5y0/V0qToOTZrBNxtRJggORpr+FAoRqqalg3

ISwSrIWws7W7TYdo4G/4+gmVd5G6v18I5lIQCfMCml
DtFDWoMyMlsNtvvOEDhKw59XAKBXesbaLKcAKhu1H3P8sVPuy//z0IBFCBIJTe3CC2lizFlpwfMvKZ3B

t8BWYxu08meDQXEuDz47hvEg7aKoXZX2+cFX3s+VB5Yp6q443cSbm0BfmgkTmrATst4wZ6vPeQuCbYJU

nFNERJPojRvfifm4NTfTTGcJ+vPxQZVo6tOnIuAzyi
Jz9tFstt1wLhQyBlKJxHXerg0NxCHgwfNIW0NF8s8mLqhfku7h9hF01BZ+hbDuiSnz9/romvySSD4jg/

V7E0nm0kH+PX23Uza1VxcLFM8Y0c6oCcNmhfNClvnJeEvX9z2oCVO+kjb/jF6ArkGWSs8npV4ZvJlnpU

hsmBQlx5IcTpSTRjL9GYI10+V6Zw+HpvATkKetm+PX
t+ACt6B3YalpFOhDHFgLMdF83dzPblzKUQIVLp6n9zdn+XKUmZG4N6XxG+0eiWtNqQ0lsZP/fu24p7A0

uENL/u3S9EE9d2rgZDCUi75CBRvoZEVADtCIfkAFJgt9MZzGWc9o/K//TgL2S9Pf8WVaC0+5gQeZR7pZ

VmXyd1IQ+MZIcR5O121mkY2GDqogfOzgbpRZmNdX1+
vkFOft9I0XbVexz4myi2ypqSqYX4szLxjV+Mj/QQdLrGmDwp39KSRxIgBsxVYq2gOkbmsVFR0tTcNOg2

hmdjA4YbdIFLISw0BkiCoinZB5tcIIHsXMOPRFLtlqLGdf1rjvBFpJSdBH9Q1D1jsbAGCgT/LiY9hLm4

uIqT7jwP9bfzo+AANf/KswwlYv2gPqVMd7Fc+NChEC
8aMSAWgDgHuQbyq1ziv21RiJdWotjpasggqROX1cDc8IgmoFSjPRuFZr5tYf3uzV0LqRMHEaGAPo+Pkp

x8oIWNHblyuRjOFdRf/ZoW0fZ0hJTCJlnXX4wk2QsNokhkSkxFaNUQcIovj7ajuDtbgOZakEWqfDGPYY

QuDVSKGeYKkHXkTh9AV/piO7tB5I2AGMlnfwCQWiAK
hMjU9f1wPmfKEqG0qRhR3NRDA7HEmP/1DUidoypUKke3mimY3x+2YpYJwfhu27PM4214f+/CiI/4DJOF

qCnQ1GZ5AOc6CW9osWMZu4+rtJ5pdo9u41fW6pSBZtdlQVeylFgiSL7IUXWJwEerLTYV/Xsp8HybaPtZ

CiCWW8VJ8GNxiO30N0eWdM0Zr3UXTlMXmuLELtmFv2
y18Dernw+WLdidH+LU5UI5Yu9PQVFULRiXRdJlueWOQpdNpURIYxKYxGU42nGnlnekJZxPSMJj2LrGrB

xHfuUzdsibyj9Cg2eTxnDZn9NKr7Qy9N6bWuztL4vrjTToyv9JHLZjaA06lDTJPa2uVLzfIChrdPl8lD

Zob4yEo7yqAoKMceN8Fz9zCbdACipOIOW8+xwCx9Lq
/mHy0QKJR9H93FL8P27BwVpjD15PkVSTy0ivFyo0qotpo+xlcWKLKGFyfND3CnKQTgcCJhc7Diw2jWcW

E/P0YPUSMDkQ5Ei2c/gySXDYWHTieK44D3kFXBHOPF31uzo9htzGnOamBul0yY75pPM9LWj6llwsuF1i

D6Dg3qshabBEouai8wXGrZ/Xdv/e/b60MP+OWj7qf9
EffFJktMvlm7kWMAY3QPidiuMUw+Tfuwj735nNgSlBXKMDrIRbAKEHq06HH0kQv5T5E7dLzNVE7Tw/fI

NfbWPpoPb1Ub1kOaswh79amQNJj5sHkTW8v9HmpOmsU8vIEGvibZFb5Tc97+JCHL6ypDrZfnpS9+6VNI

buYMoJC4YT4j57C2135dBvff/EkkWCqAZJoEqC8i0m
wlfqr1+/3gWCq6Sn3dDTT3kSd7P/Tff587beNL2P1nDpJyI7hDSO1QGymw1mLFdXCTivVt/85q+yb9Dw

Ae6Ut6dPg5NknUGSKnvzXI1tNaOEoTvuXJjAFUIigu4rlHFJXw/AHvhjPqmonIu7QdtnWlTfUen7Gz4c

QjlgouB78hVrhMQhZShRfJcONHCKG0SlgoOG+vhe5s
P7ww2wsnfd5RM3TOjPYvK5e4s24FA/4vLv2LITpafAcbWse/7s+WSDNLT80DgRLVcrueXPJMaMalbVyW

PBJZo4xRXbCKcNSSKWFKrQ0rbZ6kc0adA6Jq7covUMK5Fj8MA8+/dRBY4c1QZR7P9gQ4agyRMoUrmlKG

H1RKYmWiw5bA6LN1RQCRoKhYJH4WMM1wK6pBp4ba7A
UliTOniFCIrlHZyyLGDBwaYRwMUqLjenj7vDLeGHoPZucAQaqr3zbzgMqy9txob2m9FpHmbCFh4D080c

yzxkSUmJvBwyQSYhIoprr+2/nYuYuY7Zf23vk6djlcIwPZmAV1nlaQtbSJRS3HVFX2extkxd1D4FWmw+

hzd6xe/xAHNutUGGA56EVeZXZS5kNYB2lm9B7/zFi+
Lw7s59rGnVc6j8evkGwLju+Ta/hP7i619QABI+uvc7CcvSZjQ7yXRJLPRlxoVyQ2ctcqCA+KaNnuyoY9

6Uccg1TdYMj0bLFTWw8u2pXzhRqGBhPsuB2QzGkkoCcZPuMOhDurg10jguPUtXH9HO7pWlP6r87MVi9t

3+wjlZ4fkh8U+hbFZS3ijMcHy1V7VAYaPkCqVAVdAa
1NePiIjoae+Ftq9fX+Fr3IaFNXQjRofNzGTIGtP231VUYpR8pIsUQrmyjRug0NOkS1OQNSB6HKL2pP+B

wlytUkHAe9FuZUD0DyUSOT3+azoZEKz9cP+dA5eadoRmMWdp4q/EoOxepgVGVS3ZHSfgkG+F8YIYIBYR

YASZQJiH/E30gLx2Ba7ObTmwnUsDT6QmEc2zNACDxU
Sp/2lAALKIbAH2CAvVH/BOi5yVSSwkt7mY6IS52RrKXs9UF5fl0sMOT+On7qssuKa7aS+YPLcGk9eAIT

9SR4F6xi+reJrJHUt28TJAPuIoCStw+KACU2KxcJoLoBKF2MKcp3teWf54kFn9obRrvg9H8m6QgjYzvK

nHWxL78ChONrOYv/xS6Z0Kqi6fMnQqo2+3D+6ngZ+q
4voEJl7HjJFftAgHB9dXQQYJGUpPDwbgcFY+nj0VyJgiQGM9MsERDNCVRjQXEj2r1S8803oStVG0MdcQ

fUnjF8Jr9kJ+wNbVe54VXHEjLBComkZOLVyrlozUJl0PuoQC3dc2aHouDQAqr/nN3CNZTB0c0BlBvFOO

EW/H7jf3LzlSHtB9+PvPy5U7uaO/k6fC+AUKbwxdR3
vq4BogjF8NzCIkvo0I5h4aciPCt/zHeGPI9+ohWpU9cDQWE24AXETXttfak1um7WLckOMWDcTZvPmGE8

l5p0ia2/G5kygKsPJRBhdE1Rsrn8tTA5akbIiXDevyvzTZrLSbG3wVSJcMnal76Dh6/SAZIxhEvBy89r

9BejmI9dHDAVKQy6BPOFNVFm7oi14HQNDXj/kyD/wm
F37XtVLOzor4nlyDztJ8T7U8vJglokuRxl5ZApCJaMNHXf+VRqNnlf7j4Y7v28U/csyqm1cnnuBblpxn

nOEYIpiTBP4MXY8c/yx2pLqZlIy3ooOzQx7FTkpzfIq+nDiaPXL5vfvVBNRzgQL9MWH7+MQH3KRsZhFV

NkJn4t+OeZNycXCAXkO/YtZR9VBctgGB2PxWZLo+bX
onfZZKwSjpn28hqUd3j5/CnOrXcRUsznYdmEOAK1K7q1h9fTjs98EGEoESHsxnexG2yU2RyEschrenFJ

9FyOk92yMTy/LQkaLfdSujWi26AK7G+97uh4LjLQLjP36HcCOhENOz8xsK2HIA7xRde9ta4OleEZJZN4

k2KslR9Oveo5leXdBw5wkSRR7B2Wr1I/Mib40jzqBG
Wruock+4lN9CTlYfgYbK96L1hZB+A1QXX7cDzXLEm68U9FuIo5xumMdbXUoEAtvAI/Hxj1o0S6pCzjxI

2062RRpcia8b4X7cTrLma0WRYK+o+UBFuU3mRVcIlPPRlqtOM993D6VgU/UMSlhXLmah4iV11vDRVtaJ

fnqh8axNoS//ldF8a8p9Rv/wW9NHs9LA2zhmeoeoo1
Kin2QfljBlysqGDAzQfv7I26i5+qik4j8VP7PTR5YSE1di+YHGa3E1C4Od6+/c9lKWTkNsH0TIDniJDn

P5ZkGV5IYPdREAkBPFFl4ecRWbO7oZSBeswGCxTJHpi/jPcwFVwhiKuxG+f/oS1GYXmMOj98hdzZIWT8

KPQ90EiQac+qH39lDninyf5a/TzUpDsmdOm/HhY+9W
x7y8V/5+2vnFwsQMKmpvgzcPWBDw0gYvvuliXuotVbAenpBGZgpvZopcVIswoYvYRUItwvwxuu5yO9SF

lwMmRDfXQMnhXscc08qbRG0kO9HE5sJcQ35+a9IDsAw5egHigDwq3EJcNPmKAdwv4Hm144vN1zYMcx0U

C96v0sGXIEVXV+SXhYRNRM5vXa4DJ/Q1QSfVzpXsEv
sjcxAOKSRk8UoSfWmL2Na5hRtVbn9+zMMYd2WdFn8qTdlkB21jhRXEUmCClLCD6+HBi96IyeLsz53jXQ

A7RpKEahg8QSKo3oAVJm2nnXl/e/qJ8WobLZp0ihKV9uFBswc1KvKMJKClyFnyrXSWAnmgONIi7C3WLz

uzOXAPEbEQGEYK6dJHX8UQ4EG/p/s+gZcyLhmipQHy
Ie7av0PEeMTI0zDMILOpoFqxLV/7IA1pgQucevYXYYlLFqqvDJ63rWemyTYCPp4xeQ7L4T9DAXVFMPnn

8NtsCuzHmZRtGBHYLK76R6XAF38PsPP0eO18EF+Gh3vRdJzEdAeTPjfdRPIX/HuZO8zSN4IDrMjXYW1b

zXqC0M90rnUyCzEAMmOT8jimcQUJdYlt9kI0CO51h7
XtJiJ0IHdXZmtBTmi8+Zb3s4OVogli4rnbGlUH3vA+L31MhzduanWl4xx7mxuqaBljDxX978uww360O6

SqPzhrSD2XWrpRB8fDn2ZL+e6GN5idXbUlSm3ya7yrV+2wECeZ38qbt6YGjERX5P+MawWdJNpXIuoF1s

C+WfB2Y2f8zsJVD11NypitI2zrzuUxtLnfK4F26Lk3
tStbqV9LE/EgFDSeHZQTre8D/mJw4nCzzwpiZb/SUNF6m46AKpzFmZU7nfuj6ifXZzikkVDofN1xfQJL

PSW/q3YbfTOOgbFKDUVB/4Ef+7xTKEg+U7DvfNEmj1VdOMoUPlJBFbfRiCmKphg4HIXGPTQzv7UvW273

k+rdtvMqMriRKQEARn2Ftkqo7uuIwvSGyNcXQ4NZ+2
/yzpdscvp5SdzqzYiBG8p+EaldBGuGfAlXmXXdFIPVbvHlo8Gwdd8+H9CZv4JtOF3xP3/rDAM0zwDaMU

rsH6DbfshMFnsyduQUyMSg9CNOvsbNhKZn0b2XPO+leMO1TBes/k6m0fFcj8eyQ8SGAQyxGmWcCeUuea

uV7ClpxAlMgYr0fXJ5miU0Hq9PYXZgpYKRnb/LCko/
eiYMWeNoLS3euEvUQNHhDs8ANidBocUWbez+l+58fUt7CvogFIHRsjOXx6OFMZLcgRTbDBDEPhGZxSON

sXSjPpS6jINodOkUTUXcc/CRjCeArtCEtVNA6mvjT5Ln9aTWZgRTxJqCNVj2hLpfKjc0rnw4fBvUyU94

++MAw1beGhnV1NSNxaKYqCjk5wQsbDFQWNgVt8LGrY
nXhf1rOMA3LS9QYbEAdx3iVVPC7VKeB8m0HW7rTCFYegq5WBDbBDFz19uAsjEgBUBvWnrPaW0zu4FWDp

JRVs7fnfM6rFKM4bQXETGpOPCz6j2+zgIQlAYuGSCMCmSVKVn8CuzPFIVYBEIuGSemQxjFUjIpJl5rkv

39BzJQssOTd6Zmu0B4+Yky3mE8RoZz90I1lExA7cTb
PwHMT0O6+p+gGLHkQYsiji/FHppnK3Pqk+NuBboZprshXL3EwkbpJHoXpvea4gHwncj/Sb8ePvckJyKZ

DQsYgQD/UUOJOFYhmmqoFD4Xv5n0r/iSTEMM9rxb+MomVLS93K9D3sHCViLiNUHFK5bNpnEmwgIHR1l2

xWigSvexC3P1cQoJVSNhy7cmSVKLPZBNld7BwY66Mp
YfslO0jdHKYnB7Jv88W/CcgvqlKFUR4OZIeGFvPktmMCfn42yQlqfvUJHLGUionukcGyFgPy588NGnpa

MmdLqPvh7rNpec55Nz9AMMqYCr6xDBeGtdpVrEmPYhcenqievmJ5zIXQD+CumofeN/gjcAp0+u2jFabs

i4oiKeVhd01VOh+ZDXNVTUJLRIH7iTySz7CiuzI5+/
vo7QknfYJVXeAAGLxOJGwduckOVjUWUBnXDaDXPBwx7HTU7gPCEMMVaycgrTWJbRCWBr6umNDAt9dO2O

6CmCfTDb/b3SHGdm7LA+jy4w4L3jNqvkAfBbEOhiLSSOCpWTl6L68HM9N/JPslzg5AunhqktthDiH4WT

Z5i0xtEo2Zro2fus8onF6JWD/GHzVlt1akVgul7b6o
JMTBzwdmCt+IylLDHcjMD4H+TlTTRSYFF4uPeBIoP/PC8fFpRDT9rKfimlNjOUMqLGD+QEsoCgELreWU

nOn0Xlf/Mk2a68dOBFlquTLjs4R093AdBqlY26gkriG5Md6MIFP+A/Pbj/vOMRLO3Fpg+GIUo8tkAyJ4

3Yb5s4xAXIS5vOvf+TzxoRGJuXSlitb6lUfd0QYwpJ
aekrLl+8nR8qOFYh1Ah1UAJfJqFRHPcKziPYts8V6U2k4S/k6hA8P6JXiJlM3ah1u8nsDajDL9gy9QAT

Xu60XjP/v+E4rLC3sWPvtvSKKJOStHDCUJnDExhnzHQQZKaWsUtoyq/vvzm22hUG3E1i/j59uv8adxyC

PFu1O0CNxNK5DHqdmSUguj5xuf4wUJ0TRCK/5fehou
LRw0f4/gsGxl2tJ7QGmD4//zj2WPFs17jeK1eDt7o9337qO1JGxjsg7Hvbf2GYYjBqwHTI8iJxMumDGQ

EzcwxrkwUm0le8qwRcmoDydGx0OFumdlf0X22Cn2Li6F4gfeasi0vy1ivCW1wxBxaH0Zaxxyinj4C1hC

BpnJJ7zmcWX4vbbOWvRUSY3Y2oXuSsuMSMz01d5WPk
GFLRP2w9qwe7s2e4oZJZcP4bOfyk6loSdzzxasnNtRfp5aQep270j0jSFd1DSgyI1Nc83CWgjjkhnQbX

VZftJiMGzutyUHp97BcJyFMghGh3Uiify6h9hyqNKDPie55ynkHrXj6+fLm7O+tiXb/b2cB3z3+6dOHC

wC92cgQ1A0+gwMXFZbXHypUeHiuW/91y5JKJ2Vhp65
xe8/Kvr4VSOZd5bRLQ4PQKliqDm1bxkj5JREQNOvm1BBxhwOK9Dq+tvq/mS0S1XK/ooibh6YfgzjbVwn

W07KqNTIes9giBXhA0lZ9U8dEX6mKZ7wnDiqb4y40gn/kL+Xf5689tR/mpy/lXZia8yQa5lEVj1jt1il

sCtgdEHz+vb1KU630Jkag3UHtErjowidSo5W8UugvU
xbp+l8PXty4/kVltGCBaeFxP6kueq7cvj6uej6cLjNnHgtdzafTRw4aOkOMWBQVZ/cIRqeZ4VJDqBdCK

SPC27DL6O3x/X5Cf/+Y2yaGrdmKlEgiRJ8bhMqvBUOtfpe7mQyja8r8MJ/xghxK9QCiscChPjYlxXyVu

VKaLsh79jhg3uhcxuNGWIhoh/FqUvH+PrLzoYZct+q
sLtFSTKbqNmvcbnhWAucfEz8SBfV98SESTsr6KjNzcmUBHOlZlpp4dt5DS+CIk0I6eH4bnDPPChBaCnP

aUs1LqX2i88tHVZskz/GoDxYSeCLcXssLFaT41tw2H74jWVckKq8L5ZSG1Pr8Zs5+9oZF50J+NX5UC86

OHtvGmRMwnlLRR1ommCmOBHyQ9cihIrM3bgwNT85bB
vyzPLPy/QN/R7glA04/X6xSuxVeq3nAaVuAizFDfja6+bpK+dw1XfYP7xEIAFMKRFp5bc7Fe0QHDswOa

tGisRlBEjXSLmL5+/Zaeynjn5/8t/JGN0J2Z/6Px0ZOg+prV6LkcRNBpFpL1HCdCT3BFnYQmLMRQZJZh

Oy38ZHJe+u2ZKRClpwCtCWahQZ8YzucZe6Zp8/27bg
pJsqR1Ihmbsi9Rc72YrFCgBWJm80Gg8z4cTmiSPjvb/Y6EE1fVq7aEtUBvVqMgrABuXh5c3ggVtor6Ln

l3wVSyvMmPnZ6zk9fnfM/BR0M63atw3i0jrRJupF4epZaGlcQytJTlAL4cEoZkPCbQp5z3Cidu1nTHqq

3r+CPiHtfuzUjLWzfq00d0MHOp80chze655MhIBidH
c/s3PSV0UIa6n+cDGjy8iU/IiyyOk054y/QhQ4MX6UefQ8D/D0/Hi9gMsVDPs7GHoq/gEMgI/nzKXz4p

xFH0Te17mtEn9XJVSMyKp2DG4z46RuC3aMv40pCFi8Pz7UF7ynG2EFtKVnLdXA9vM8PCvTaOO7AUPOxP

9YelAwKhEsSpbPXDQzpZYP6S5tmbixOPJmEtaBBRs6
JyyqTQv7AldRaDFVlLlPjdzTIYkE6SGFcjxRv8Klko07qV/OwyQVlO7JJXINcSLNJMdb4rG1KuAb19WK

Dm0h25bbWOnBx2+3b/vn0Md+LtFnGAlIuOO5cOEpxWKE87Lo44NU5JRxyjHqtUqLu+6dKlUKjHsLAwtC

cX9g0vDoOtaLpfXbr+bGxYq9ji2F7dt3DQwkEA7WYF
xC3dnvGa1+2dU0hwpROM8xPE6IBw37Ygpu/jlMFCDxMHIiDbHBXFgUcb8ClMTKfxZnUKS7oZbZinmYzL

RvkVqffQFasEofj3aQbAiqW4P+xvL7aotCd5lKDRiEg0Iidgpfog7RoKMBFqs4wGxTDx5GiH7s0OsTbB

/5091tpdLZN9kbh+kFd0WYd8F1YK+k/lfuDu7PrjxM
t6jh7wZTSpxHBOuilR949+7uOWRIiFBGRyRjkp9NVDLesQwaoiULeT6PK9fSi0wKmRTsbnh5iZR9MZCw

fGxHVF45CkfQm30CStNMEcTt6yaAvYi7iu5xADVDO08OOqUT2PPLXgB9i6m+jJ2jox402gmhLhkjF9hX

OQrW+oXIRJn8t30YZQpIUWdKiH+gpRLnB64pLsc48u
tubuLALnem34ZVJbY4jdrN8md05UsB1WfMEUq8ix4xtbuJj+ta5vdTTlzsD4gYwueTaULVImmypsAMHZ

zgrN0uKY276oUXSfs7fw/W7oGAaQW5saj6NdEqLrlIOr29dUa54Jp/yR4iVUq9tq8k4SzfVtKKfOAZss

mzzi1TjlbCsFt5Xln/MCx/mG+zyv5goHcCbYpf2o8S
2SC2s6aaLKsxdbg289AsDqhXRjdhYj5m9GVN0UBivPaq30aWBoLtLaDdDia4t6Fz841IU55BLiq+fMef

fra2DkORJia94Zo/X4PLkqVMfYZjtWEV3LdSifgOU8haZeUng5Zk1kJwUsqGyhViF6JWe5FVTL8FUdl4

42r9g7DD5mZzh+9QWJiYlSomLIzmPorsWYsAHUYWlu
Rl28tNVHjUgoOiGHWUL3sqZzVHTu4+63zdjIKGhnYN+9D6rOy8s1vR1IA/NFFhp0sc2gV2o7F7+b12/6

kwrxa1besFue7k5FBGOUyK+qwFcw8kpvut9u40BAywoSWNWHBODbfquSyr7HgfLw/+zteNgfGsu5Ube8

1IGq4zEXmWn5dYNCbcAvZ8Gz7sgHtI5QHhVcy9HE70
VqTHxc/ID+/HNUmGS8JpTCgVPfYmvog7M12dK4BxVGzXZkg/v3jx/XBNBhRLLRCdXkf5gZz9+dFfa6Tl

/RoyfAK+SlZYhElHI+artNnY7xnvuvHVBs9afmYJyFvvIFxbSwjH9gDWsKoFq59xD/+ge0cY9ZQF0OjP

mORogEJNm8dnH5whv3IQhl9Y/LqVkQYEmeZxPGS2So
A2AlJyFrtD5eJQ/OzsZWUNDtNJ/QoODVHiukJCSSEhmcRzl+/Hjos/hJl2ifbmazCdFCLuWcUZodL1TL

Ynzej6O2PUobLdUt6xp0KZ2CBU03dbuqxPcMVmpkju75Sp0MaFdbY+GZhZjbuhcvdK7+Lt6hG42TGPKP

2wTpb315VUkZ5xpqHP0Z+/bRffXokqWD8zHlVmUOIO
mp/cevzjLvdbHf0Xh70xGm69/2DY90zrICQIo97zBz7eIFEVeNEFDLg7y/m5jjPdlVduvrYbsGHhkJw1

HYpu51IWplQZYrTvTT/gKd6jaKXQhcb8ptaM8YJlRCIejfmtCr2CbHsfxkvGJJOwAyuRp/ra+u34wWU5

9P3+U5FYHQkqvtwoaJehVPNzuJlkJCJVHjhvzA9hJJ
gYQ+W93MaQHwJFBi/+yX5x6AomSA8KNUKli99Oy6QAkGLnBAcRKVPec6UYsIMJw/4QG0O51dxdqf3WNh

b4mlOvpsKoPs5Tpnc4f0JsobOG/R1xWYeaNxhhRwP17SKsK67uc2lQuaAs+nnj+wl12dNugR6Wv94ANX

jdCgV4vp7cSIHbihNH4J4qtuGpTi8dKbWILXcXNow/
Dpor7QPpH5qwgzTW/OqDZgwQoTtbHHLxZbTOa9lu9MYuznEEnxv44IL9mir317fuAvS+rQacY18rwzpY

5foHQ9cACY5MJ619YLCIDHBWwSjyXKlifpnIOjFvsGEL7LFQOulhgFszwOOcM15Lg5IBgHfMm470IK2d

nqCFvkxgudXd2olywTPZoUMfHTXdXd7q10/nFV3LCk
dpr9eRpL+bu12OQzk6wrIEUCwlyyg7fFkbcWr+uNETurrQNMbTfrHVEoXLEE4lm6/bh0Pjp13BhZ2+Ej

8ct931q3t94+lC0GYRaRkagYJEIQ7r1GivFR6dfUgiDWOqaqHo7dS2jQanK9ISmaNz2345Z53l84iFwS

BrNvrUqeW3hwmog0kGq4m+bXkKBgWUlEjrIaZrUVHZ
4B9mpLNhXxyDNjXPT7rYtS9TwPccP9wK6E+amtEbAAePqaHZKxxK16MYqt8IWEv29x3lNlddxifLPpw9

FiIDPv1kVW0sxl0klm9pfPAX6dzkAZuo5zWFaiklZkq4VUpHDOVclEx2jAttSCMzy/3/8BLo2ZCBVoPD

eGT1pH8SxTGMcjmZMpw1rFnKgW6XpVpTjW7JNI8fsR
f3WHEpTPxV1L3z3KTgdKacMloQoK7bg/8ogTSkNRlcQZyKpvVgoAvMULWaR0809MCgNmjxEvIgBWhPXC

RHCPZi9T2CSJZCZXN4XsdURFQBq5nEPRYljXTRdxUYUjSxD7ng7KNOATHsNKY97cFP4flOZNAXrnw2TN

Mg9awWVKZ5joTEVFjo84mVrU1RRVIa0gNkf5ciDvb5
ugMnKGcUZsw3t2mXmyDaR3MDjRG7GSd4Vx0qwQlWf4qiQ2UDVBIGr+fc522RrERUM78/Wp9lZOzISDUR

w16k9sTQ4JOMibh9RDekoZKd6vz6mELgkMznyXpIh93p4pxEGelSKZi4/AY6pKOobWVggcseSwGB77q3

0d0CxWhql9SzYX36z6oc1YG31lK+SNh/fvw/3YmBif
mXnqk2ehfq4eSKhCBHLzOdum5ozPy5Wk3yZj76wEaCBysvT+flWSkwvw9/9cVfXixYuysrID+/ebGBgR

bZv5+qxvcqlU4q3IKQwmAN95Zy8fVx13ngTZkeEl/6zTt2u0yJWvjuXo+pZ4ZjL9+fzwdPEvzJDolJ72

q7eC44EXvjkRCd28LOeIfsk452In7WXlz6wEtzIb6k
SpU0F6/KrdBZEy4VVDnYaHdh++ccpot3Yu87WtZJCyT888+Q8GFNUwNyjm1UmN9hQsqZuFii3fl/3791

VXzTnqFwym0u4tuFvRPpmKd7LtF+OL9gtIKEip3e+wOo0cftT37RYrnT2Jynr9GOfNFtJeOwBKNp1T48

+lJ83W6eMFXj7GmmcSk50fgJeF53mVsXkzvfN8dWGp
4oJ58/x4rO9u0+WHHdCFyjC3yAkNyyJdfM7b25+v/8dZt6hlwX/m7yCt3AdkJj8jR0zrpdDHG0C4DXWJ

XI/Nom1NObxMTX/8E5ZCC2/K8eSHIiiVu3nhVUr1+6E9Efx7XTm9gGtnRdbKZNmB035VOmMGVbuUZb94

qZzdHMfUhswt36GKRg9GKwdlviGISgzbn54Aa/vmLW
7Fvu9cpKy26OCEW5wt4vvuceF9Fq09R5hjw0lvMijCGIcCjKXGQUkJvVOfz77lr6qF4Sdsjb3NHsSfCi

kD+7sa4BuQZiex7cY7LKJ6QyWdCZk0rW5PmImyK2845QYJ9Rnia/RyKqvQeXTHCzhmOnCt88uAfJKWcf

tbDkrlCu974vH9Q623xSO/ka7QEiOmabuUw1chvfBh
GuVPM50JAfBBUncykcqCxXlEwquxYCrKXmfUDBTQ8yo8knLTWwKDnqxcHxCHBCu5TKS5kj8LwCiruFxK

lN3P06akWj7szDu3wPxSQXx2PeDJg2NBXjCVb2eBMWiLJCSuOkcg+kCLVLxXq3Y8/+ZaOCS9u8+DNNoR

XebmmYgeyYkSdiFMWJ9Oy5o5p/Lw7TRuMVXeCTqI1n
7OzniAyk+VIiIiRA/2iAAnyulqyLRUcUNKVxbt2QjR9yZJt0LbRJbGcij0HfW5Vr/4+/nJjO0v41zhFu

35fDE5Ietqg/haRgll0dHJkKOlHzb72BSNqSwjpyDAXPFR2Szk9sWAh4oUrZ5Dg4bgABDT6QThDu5ARL

e9hoXPmIfFWTFhbrd3HYff521ugyWUhLcylZTUsOCT
u4ZKd0iS5siy8/HWyPBT2uFP7c06+rN44BfBYdIdcdTaZMbtmjiLW0gwRwZzxXjoi6qz5r8Pgh8XIsQP

Kivfofk6B4HIkI/s4XUiWWZmyesbRlcmSKDnLxDdn07OZ9JjBuPtFWZJkG6BmcEx3WS+Nyl9lCWrHAAg

q+Zaqr5cClkt7kvNSfDyfcQVqrBnpjNlZWXpchMT/t
hvXn5KJmx3q4vu8vBW0rMdqaRAtyVvgfxa65HxpWpdnUcz5p5U2WkYE53Dm9iizEc30RGlQwHLGEeJQP

z4MG8Ju/nV5MZXY/g0W1mAgyAP4UuNZ6gFj1rqfaUbw40ZTcQJXzV3egFb0pJ3P+TqXSj562mDYOANYg

ioyuBmdEAl6bRU/E388CQLl8SGGVmK0lPD1AID4WNP
EIztqdJe2TNZK8Ntqm+hmLnmah57Z4qPu1YC9w8+9KBoQHi9qkSpi4/70A+Fcjoq97VjmyWMZZam9bRs

Z63eB07Axc7hZKJnWkkRbvuHxIO9DcSHtiDzBSzgfYZI8Txl+/f2Im+0rkEZyOapAv4UI41pR0wcDPg2

EL15/NbImZAk6C+xPPSViSbuybJkacrO1xivVHrEIF
lo4s47nI3ZWPdF6OjXpM+hH1tbYq76Y5raKd5bWJgDq/I24G2KeNlz+2gOmqW802z2ywMGX2QzahN3rC

WD6micCjyWZDyvGH2nrWhA540U8t/EJLY79FT14vVm6FsMbaZ69CRTG3KAqp7RPW+Ak8Ii3zoaSkODOV

FJzwlXeVmiXu0kHxvHUsNj9auotaOFuoWoC134gjPP
ohu5Uo5Q9QT1v+6pR6584bchfT+shm2gjq59orKmLJJiiEDNz6rXFM1z/vibY5MNdxz94TK/f/4cdzou

O5xjgwK201DfkZ3WXC5//pnfCFkFdmosR60r9tlK8wJBs6kmNQZX8ficdP3545lOsOR6dRgLvmLkFj5r

sjW5kjKPQiAp8pDW7ik7i+hJGfkNhJpG9G9xTR99ty
72f7zEfVnXKIVtK4An4k67rHWAYtdpb+7xxHym5gwbOUY0g8o5x1XC6JwlyEZFm3DkkPW1GIVr9ox0lY

FnlYAaNcii5yGbVgU/WSimZZg9HWAV4EZrp3XW6UODuPfIE5npajanlPFWk8L3O0x6I95A/oeGhiYnJ9

WopnHTbRppZnSn1cTq1GtGu5rqhgnYSJn48cTF/bn6
uDjcCFJKopFj9flfx5mbu1HBDTRHnGRLuXffcxocpou54aAqk+mzsJprmUhDpcqPHYqBxDqvJwe6FYul

h8FCnCEcr8RmbnVatJ5NiYx19VZuMdky7fPSwU/UO1EKjdAYir+fd3KXemW/O3qVb7h2mtTv6+njJ4cO

VUc4eB52dO53u/8eug/ePqFFQWb/E3nGUJZmt6gEcL
TOJmEVgd5QBa0w2Ck9G+LG0D+KYAFkyVZrO54IP9Lx/o3Zk/9/4Uvsraqr2XhfYMopzgyNDghHMGCJvc

5QTpgLKks6OBn9faqKZHxQ/tlcsitlXDf1qzYeGaMC4C103Jzh6rVtqRdMVN8BemZhfwmSKpc/v0EvKs

wdvzLExs810Ul3daZ65PeBCmEtGuAWVjhp/OWgEQzO
od6ke+mNPT1g9GBAiIDScAbpfzJKZ/3XHVxr6cznYUHzjZkmLW8+/pgbOcumwQIL/2PameuA5y9/fuTl

8qQJVo5/T6cY2nHv65lEA5AixGrxAbxTAsew/VpYjZhUBBdGQ0VIDA0xBLWmIzgJjTgkVlJVCF2HsyIg

PE5n7BO2d7vjvXGL/wlhq4vV7soVnDGodabK4hFTPC
/PvJqppKhU+ZkIrbZqPi886eAVk+JQqdO5JuQXUwdvAJ3f8Xf8tLWfR6fIqzsI/Kz0wnAtPGSUViDzuF

mHlSCEVgL2CFaadFU/G1imE3WxbQz4Bw1fL3uWFWxdlqPdn7HZVi2+2bEvqXOOprfO0xTJKIj5r765xe

tT4LwuENd3wUn1ot8MumTEX/9h3Lh+/ccs8hFbGA1v
m9JSU+q176mEVKFCCQRWh86g5I75EYqayLDSOORHua/D/jMzbCpzLuTxXIG0cfRcpVwyqqDtCmvDAPZl

NJYrTyjXCDNlNfNPNG5oa0QcSdR5UOCvc2XjCow3NL1ZIS5cvIdjPEcaYPCODB5NdYd8NMByN2KsBKUj

RESlg9OrZO4+ThT7uaJfcCi0jNTRSHVnJfJ+96/ox9
0qdphDc/tz6ZRSNMTCbDr98G2PkDUwUH6auRR31+vrWIb7kEVwTmhASdtx++zpp7peVpW2+eK/tOUwy3

c2y5wWlHFkPSBFDeCbQ785/SrtWCNNKtSYdqE6kHLQ8vYTdvRYZ6AMoVbOc+6UdMAAUBKNw8zoTHpGiy

XO8zCzCmvXtPl3lec872cx6OqGNiHUQ0vlZvdCmirj
79H5jHkpLEdelk1kxNPzwNAbTGeY6pNAf7eo1X0CoUikqeXxy6H0y50p5odJDspPZKYMGPGO5hxzUwM9

e/PAcQaVnzFwF9rSxco2CuXXZnChGCL2gLFETppI9D8yFPau61a6wrgHP63o86Xws/mszz3mDJfU/4Sz

4RE3ML6saVmBNs8zrwHGW11vPNmKaaxewJKmTXGLpP
8f0BESWYrprr4k08QKNvasx5fM+viLnTxQMIaYvUcyPP/3CtrNdVBRo9tuUjvId2W2ZvtnuakGp8NMNA

EW4TdngnFJLkzz5c6cnE8OLYamfLInxdQbVtCQvUHA/UC/Ugb7oNH0Y+6z2b8rcDLhQxt/7cQsVC6umo

UMt2XK3vaO+9JDocvfTLGHedpTICt9eJO0fOuHWGdd
IzDmDX3jtYwgU8j7dRoNCbUAsN1mq6S+fuED/5D47JneD28lh9G9pcVRwqIOjtRZkEly4tb3uXqxbP13

041qTLVJY49POhfB9MCTxd3Po3wVdQib8iC6IiN+xDZVQlx0yZlKvowpuj8wDrZqECr1ksheQ/wirSGP

6xYYODwsX9wO0taWix8YFccn3n6L5/FeIfHd1yGNKw
PuWOZzBuN0LAtwmGp98/BQwZJVfCOa0vKU3rbEASSUcEdWfkhcXysWjbMY9NpnWiuHF/7gYAiCc1GQBN

mA4hz9Z+MMLZFWLNupyjXxYXuVFSSR+1EPYi5GLPp5Ftu2rh4ET3TlIehWe/DILTnoEPXEBrJsdZr6CI

nCC6cFy2NDxYMgVyJrDyLU3Ib/aFRB9vRjSnhdVHq8
KmqCGSTijxy6Sfco+VrAfXkD84hZMw/C8SL3DQ7ZukNAx2CB+SVr3TCro1Tln/ktedujfcBKL7lyR/i+

hVm1an8D0vTuLzK3vFbGp6nyWqLWO0Ore21Hq7NG+2scpZMsz1nh2Pq3cU8b/NAt4xJC+cQH8TbyfWfR

cVs9Yc2FHyi7vNhUrX6u87G4bnLG6iMdBtsDoCp2PT
Cuqke8HUqcy4bYopo5WCJGo1JwtL70j4Ncfbc7SvJL+/40l5uy3bMB6O1ZBkDAn+foEacfEK9ip8JQYo

kuh6P+naNLLb4AUbiIkFy+W0m+zgNc9Ch1f0tyAyatRuxRxJ3WzZg0932DuVygwCmqvasnMQT+XQJff/

C94600//3kh/a2o/uU9QX/bdHvTncpc5nJxmywIZJr
v6wAT/MTUT7JOTFjEO1q0a/5JsA/rVGbUMdY+U0ptesYiH5ieBdx13JyKvVoEbmBUuw14iUbCb+68piP

O+W7F4V/YQAkClZJWO6pm3KyBLWkLjXcVR2lfiktBiJtEK5rixolIEY7VzGtCD6btomjZKLrFK1gbwl3

QDkwWP9BBKWpkJUrRWCoWmAhVWRVSKYmVOrjVVXkAG
EjYHmzPE4RQERoKIVlhECjEPOjUFHdRlP8LFBtMzQsT1Hct8KHp6urNfGbHFO3TGJdSsnbQLmuZT8LdQ

TzKAPqiYClRSBsITYzIbK5SYCrPhIzH3BpwV4Mp3kjFlLhXDO3XGTuZzkuISdlTZ6XUANxtbEeNGSgGV

VWTJZdT06elSHfhKMpZNLwEZAKXd6+FsBrVY8ueqbt
PBTmLA7vxze8CSViQLQtN6FyECRhARXlSqXbWphmhSAkTL0YvIN0VXWdI93wFBD+LockuVJvAF1LqOlh

Hsag5eiBuhjT3MbFgHDmfrSFio66BvpZu8aquWhDlyifZVWT7cQDyB6mYc3+GrCQhBMAGehhj52OfpzC

[id number]/99hN6gDAY16uh5NATi2dkpRjE
xF8xTBnliqH7L8QWOeo6fMkV7sSwN0IFO+GKVH1sgjWWoVcivekg4RWbHh0GJNDbRN0FtsALSKHKrUfs

xDN+38q/d3ly9rlVkmj2bgoiwIiyOlZtwz3Yt8W5rMhh/Ql1vjiQpn3bkBMHJMvauNAQNS8ri+fPHGME

wL+fc/DklEcqnalW6LZpVj31ZwmGrg11iFESJe4wAR
NqkCuZsWEyJlefQoniKHiIR4kKOONaqnNvdfGzyPTPjOOQ+pwvbg4MgBvL0O9OJ9bEnNVFU8/E8Y+P2+

R95DFkybp/gtI4gLoGVZoBKduug8MCUE2tmTGE6j4TDKCH+u61fITxEFJp7Sq23DWn4xcHgKpE9Z/nBP

dkc3p+RqcScTfuI2NPSv+q7v3kD+ju8+wGbGNMNQwG
iSbxgKeuU4pzklmDZBcVSKICZF6kJXarYHADFKaz9s3grWXGH8kf/Woz202SgUPMgXw27bY7aDQRUpWL

csyBRQCXZrfZgZu88tI2gXf0Ft8/Timr8LMcZdflQ96WPf7lMP1Fcq5mJW1WBLM8QRbYn6bSzTgUhA6+

vXaF6M05+k/A9B6ukVH+JLYvDbRCRCus7yjjYs+86P
U4MucONmqUOkTDPHTyACxvu71rVgh+EmN3wEYRX6zZFniNjHzk11b0OC2JVGU3bJZ60m/KnbB2/gje+9

4h77HP2LSqmPvw29qx9HVz5bFZpbcMGZ+VuqEamu/UUJsxLAYfQ0103DQZIm3xf/ktNLt204Jq2j+omh

8cUv8xwKhxcL91PQJh/29f3yfsHSasxbRo8hvPiMkD
QtGripGR+K6eKvw7WZ41eam2T4UAJEPxQGw/kiRXaZRgHEIpLwvExhlclcJkUORQrFUoLXhXkaryIRJm

oDp4/542mvjHxpjj1unYxeFm/wIHnTkUV5UmpoVWDWAwwkXK8bV8dIB1CWEouU+xaTgFvn5bMykPYFfN

v4XNVC9rCTdqDkLEWCzM9gUl2r1RsYvNCYJWS0Lvyk
TTPvIJPH1CHyEvV/X+CDarf5jSIQNXZd3HVNdws32ONnFHhhHCaLgUQxTSrxrJEo+wUZzmDBTF8dsJwG

l8PW8bqRPDrcgIcdcPPa+XlpeWrSZneNlsCXbHXvGDJaNtQ9sWDeoxKQFyIHxZ8wnkfpQIeiVgxsuqGS

xL3cAbEvFF3HNRXouLh1lmkwEHKAT9eVQUwYipuFVt
kUEjNIaD8QNcYcmiqs3jFJCq5VkAt6TiOFEwnOJBBqAy/NAKbbwJ8aOWHIpCpR3vf1RB58Azbwnq0naL

RnZXrKDmCvbLWMX8QISgeSHz4cK0abZITcTvabGH73phNTpALKjMThTM1GUUzOSBsigObc6OxVbjNybq

Og7LxXsj2PBGeMv9vorAErK/WHuV9Dudc+O3YuscP/
R3q9cetaN2/6UqB1uGudAE6M1PgXiS31OpQiTLBLHkOlwtpYBQkGXBTFyjCGGT2WfSJ4JBAFUxOphc/n

juXxNkJAHac74httX45yG2yNLnkisWL+YmRoJYm4gDdVYJV1cAMxE6wTaGLUjxaxbP5k07sQ0rGXY/dV

L0NxD5C+0oCsdkMZAXCSuoE2ZJ06nqFCIg1FdZY8fL
hUy1V417LhWBBKsgamlGK6z57n89D8epgwXB+XYyYPDPyDsTtTj5ij205mebVqriooOJivhagjQjhjWn

AT/G77jLpZUUgZkyKHzy+ascNNVnkh5ZU66NL6aMLGKheO6K7Ee4x0N6qRp3UaCCtmz+reZ1e1uK3Ldo

0pubqxO172Mv8fnZrrXg1FKXotauiqZ3zJHgUHAgoZ
9hQc60mpFxN7r9umZCAaagEoj08txOmzwW0aF3WrYGJyzi6uhG66OMlEnQmUjrww4stFUY2QoDrp7Caw

QXhet0Mnqx2D/5WKMOqV9JeA2ByQFiagam95FhXh0bp7l55OksWm0zhA5jAMo6YZtewzeR/mDIvtx7pI

SZUx/7/CozDjgST7axmjEiiVrungK52aqVeuRU2V7u
fVoOPPTMx/CyMJphy4tuK5mAFdfv1F4rCmvUOGpNfwwqbKb10tlRHsjDA88ImJxMy2k2e3+4+am75OEX

4YknWEn9eF+2doIpnw04NdQbzndfXQhnMon+RztknNDVcU8wP7IptHnf31ZZ504JNisw8BelnjeVAo9D

zuP33a9hiwaLPJYlU25PBPDzTkRQTYWw1yh5aS1gaJ
jRTnV1K3uGVXYtqK6bUzcmuqa1SdwdTSno0lewoaHOiJ2SLWKNK3t3HCeWSIHgvl729RJJZ/+7Nn1in0

3r91+7N66eFqglYtnkAmch47uA+vncgDTGtftE5k+6XB9098zct5qOPck5WuvrBo33CViiUJbUeqHVWy

rVz7pHz6Qmi2is14BJYAhmKGf1o1mMoLUL2mur8dcw
YA0YjN/gZ+gcDNnAOJ3ba7MHXjqkn8BrfLaxa0c/5hGE6Cv/hcaBqSmqDt65s6KVg6EGfnp4igvsSSGF

vjLeJolnW+TUWWPqm7/I0ox3pc9nW+etknjs1531xeT/wy0d4R441q8Grlx9z634bDVXuMT7OLKH4tUr

L9eb2ndTvvbrB5YFtvAUPcokjnNMLZ0bOJbdBUuGST
6VC9kmd/P6ZVHhgYtnh6Ss//pN9hU4WxTVV6dS+ZuC9t+1zkdxGefvIy+zf68dhiwtWTTifwOVtJXcPI

ugiitgbQWdtkhOuL0Vc/1gMXTXnDEEPH/TcVE13kuzp1SSQ4W5pPhgH5Sikx2QRwLIjr7bguI9jlEu+S

SE9SP8ZSMoj73+3LPW+T5ykRk0hbrpuHyciebPIedu
iT3mFr8SZl+qzXq6N79moiilInJ9tDUAqBSH6xEm5CFbV+wA178g9UtO7ta0i5+turWf+CXmc2JPVwor

+HEFNWmRTpEaMspaHpEpm9WsQ3nQKxpfLsflEhOu8TJJ3u7nsV4LgoFmfhzlwSj5N4BU5+W3KTs6356G

6h7Ys9JyWhOUZ9rzNpwBldjcQbZedJPhNiPTUnLfrH
IaM9UOzulnUmCzeLBKgrNJEpToxJAVbSWQFrSoAum5KzT2UbPDPmPZIZCsPfY0R6qLRxI1OyS3YQXFMd

BDYpgTTEw3piIfWjKEU4JYQlHzwnSGknCM0Rkh7aRy21JJamRENeXdAuLDn7Zy3DREAsMrwqZVNPo6th

UcAlGCT9TYEyAucmESpnIW5LvslbFa23OMzrSMRhLh
NiWBm0Sw4JXLNsCJDjUA36IWAnWRNESdXaO4CnfqDysjHpUWS3BFOxWgu+PgplbmRvYmoKMjIgMCBvYm

xPAKjwX2axefp0yWIhRpGoTBEnC8LgcTBmbrPgXeheoQBUPBMjEPZkYb0Wr2MlMDKyMwxavRiiz7aVTh

73r+aRdttakyKVe3ilnrP73ULbBgU7rDHlAXzPhR5e
IbrYIb9+b7ZypubqimwlUuBgzltli/fAjysCOv7dqBeHGRDEVz82awQMV6OmDYRolLrrRz+j9FVSpoJo

N0yyQPhnqLArUqHq6NFFbi9eX41Dod2YWEVN4MqEsPm75MF3xnZUZQmANZtAWoz64KXivHfH+bfnr2+o

w6aHCHARV45G2/QB1Bcc8/Udhmarn1tJ7+GOvYF5HS
EUdJkCatAsjCklOei1z82HrpLp//obpv++5h3n8yeZ4Bb8QXIiSqfVFl6iAyMsD63Bw0HtkhtXDcOzbD

M8vGQJqjgjn1TcGo0HNrfLMkoTLMP+fRkmokvCiIViPO5i8MW06DM7SaXb/AcM+a6RrqAN5HzGR8ukud

FvhTg+UB7jR8ToD1YXj3CEr3PoxKHSoBSPUqGYm+do
b1eqc5eTP0i3l3428QU2LmqELyd6/DIAapoijS3BZurRp3nH810C/ITtPkBmSjXTVMBJXedeGIscun5+

tskuewN0xy6YQIluYzCmoMDBVsJdk68qijMLGX9GbpLjgoRFjcCYAoF/Exj0HQjKLQBrOOPkCRCziraW

IBmyWrHp9IrV8my6Xg5a4lqGbtnb93MSd3SjbEkz2G
EKgSJX3xqFFBV3NbGLEE6xn/olwa0W0BR4sTy/L+YlBEAMiuQE3DrwxHzkn1QxYxG3r7/+tb5zejhARd

qTgDyzSIRwaKwQN4Pku2S0d2pto5U8LiAHGsZiq8z40OvsLDBeVb9QD4WoD1k6ysaurvfrht4EfTrRv6

fR57wGaUxQPKRgmo+FtenoHKDOt2i2glIa5rEGsdlj
lLXyXfhlkvOo+N2lINxxWUcCetSjBAA4Vndvnkq2pFp1A153CHRa6E5wYCclyxGpPY3W3wbVyEu9McR/

vIdLkKEq7DvWXyQd3QLJL/AVxhduhgr9cGoZ40O4nXFAQy6KjZGr2hejtBqECu71VTIVkd74f4Ml+bmQ

cSEbGR74uTf9ghK9L8UJJprBYW4M7tE2h7DhAWfZz0
QgU/QTW2+NCKT9rdKg7Xv4tCNhyln8yWoonpgkQD/hLxGJUZ8Byi8ud0iwmTihSPraQgrlp+FE25ggbV

om292UAuuZONFErH/1QGX+RdolyRjJxqSFmHyXFDaJ6324/eG99+BN+6ccvTnRe84mheo4L45aGxEBYK

9HB5QhJhDF6aOUqT/HzfIbpL6/WLgcRQLkjE1ql39O
Ub5V4sfJfnDb2qzehc5HVwBrXwBO2k58i4E7CIJxEzCHDIu6s9McgkpnRzQpyJRNy/s/U9dGFizYaZtq

qiubKrb40U1HsZLMAyRafAzJbZWkSI58Vz8KJum4kfltYVFJOpnnDoRuoIa56sr2YaA6223r5K3enpnn

VTnc9M6vluHkJE5H4b2J/nFlY/px2EJ9RfUmN3PcA2
01+WNjhYlgedUCsebo1nVLE9uleVRnJXBkpHwaYCS4jqEGWoWnFblXvm/rcP3FJUES6oPmVIl7Ju+yIt

09tA6FQFt3mNnDQHjcw9sW821l19BbMLnGwT75E2HowwYvnrSNWSmlcvSwJbU7llRI7+2VgHr3Sq3x1H

CylKog1EKeMkJw92Wy48mIh/V4Ymvgyx3Vlxj49bpd
cXtARxex8f2Tjz3WdRUh9vqJWnlPNne81HHx9p0l2isOSQlk9yRlwPGYOxmufZfiudNDX5H5tnZSe2x8

a3uCCglHx5Vaz/Reqx2c/X5cdDlpyjuPr7y1b63AJkXvo6a6pDb8fjl+Lhsw9bO+0IyqCn6X4vAgcx6u

EwmbcDD6XbHvZra3qGCl5ymiS72+8x08HYv3J8Bg6e
u062Vto+BrwRV9kqm3tPtdbRpm8pyHvoqz1IgQjBFqX2THudDlms8iB9UoGQ8fXsRX/tZ9cQid2yqzP+

Ztbotur13Tt3wu6ir9hTUlu8CfefnxPgaV6yFZfa8ZT2/A29COKJzTJYdPGI0VqVKtqh2jm4SUbZPWpA

WUC6LeIwETlJK0peZ4PZPiH5WtYAikygwJYFV+1Iav
PIpu/OWS+4sVnr1xOB3F+RHG2WKDb6+NSBCYVdNtIEBhIRkPUMM8anz2QtqCIyLu3UA/lL06iKz7LK1t

oJb90oxcNzje8D8htRJGfy3dFiQp49qOIuWVOcAvRMNX6X5BwCQJtI+VbFBvADvfduDX9n9qHLtfJLq5

o954/DTw63HZlfqAzosMp9qmOSh7DZU8CahvdoGQRg
izZ3UA0Z+gdrDHd4HFeGtsJO/DIeq/VUCw9qsFkDeX6tg6n09cjvVmQXpW9B418a8r0kW8wooO9izGvc

6rAcWHL0TJlq2fbqSrRdD/UTPlHcDxwP6IZ27r9qbJifD8gI498MdcpLcv7pRY2JbBHu1Ob1yNEmhyzX

IDk5CJm3KtQ+btzqvFYQjTsG1cGO2yRRgslWz7zw7F
umTgWxtsSfbLQ0YbjYgzdEFkr8jEp9XPIy9NQrggo3TmaGaLI6yH9sFeMVzuDpM5d8jEqmmtFJLVEuYZ

KmC5qUecnbGs0OREI2dmJ/HJtaqG1dc8vH0bP9uxx9lsLXqONJ0lAT6kz2eXU7MCq96MLpm9ZIXVR2e0

oytJbG6P2yKKneeI22uRzzWTnzvEoQjruxk2bRqtnH
mfPLXwUq4jIeQDcMdI6GPSpd/1qDuZmpwMH8giozTBOLwgsdBkL91F0Qp+g8iEYhrAl1pkK04dK0V8hE

mwEEr/uMh3+zuZQEUOh1Y6R+mXqX6vDhD9FE1YaUp6dyPFbMKhhC/JHgGBhET2YyuJS8z3dzI5lDQc2E

iBnFVLqxgwR+ZtKF/QCJaEGXx45xuoGSHmEy9wH2NJ
V3IT8MYkuCrIvM0q2GnpU1d8WvJBHzVoAGYUQbe/XG6hKnHTm558O8IaeNlfDIo6FUQwZiBR0ecsXjdP

PfMGhubywp9v33nc5WsxgrkDl6W4nvXaaPmtICapvUg6q/xcBhhHidoHIZ+FCJOASJR60q1ssKdTXpHM

Cbyl2JEJFzkqk2PiHaWF3T6JnNThhwScBcipWKAhtC
dFIJKytcDP+M1JM1zjeb7L+MJ/gdr7Uckc/RVLgVlpxc926EckCqCt2zKWt8cLufBTqGKQZx/esDXoDp

e1HGZCKhYz3G3eCiz669fnYDYpLj/b9+6Gj5P+uE4McQ8VBvQTve5jLxOPCk/d5em7iWhfso1b4Dx37R

i5VT640wHzXJmJN4/xWIbLuxlDwCWiAL5hSLX/qLK7
Yz5tsShFGr23+jygz3avUANdiRFlrSHPFok6KojGyz0RXArb7Ogxsp6BywwZ341Agtp752yhTTz/t1U7

qlqhryYW57o96AeRF3D9revDPB6hT/djHcJlMhOTYWAjhmNNzGBvWuEDT57nPVvhlPsXFvauK1hjf0e0

b+IcXXCy7aL/LUQMalZRZgoTkcmEQ+5v789mUhkcrQ
uiq2BmK94Q1p7aniNMwFZKa4JxXbjFIYpcOy85+xZL4zv8a7Cmdv671qfy9i4ajzsDqGwPv/C4XUfk7H

8mh158t1d18mN+G88llyzz00XQ9B2NU5KHW5seZSVmfjn51enlXs7ghDzNiR/6q6H/EUc2OjbUWS0cx4

SbMKWhGvQnYJ5tdzrrGpLrLX5yayhtDvLaWuYnDB2c
oxfyFKXvBW9ovgy0BQomPH0GGJSvhRZjKWZyCoEoYXZNENIeOWhsKFTvOQRzJSxtJW7YQPUwNMNrxKBg

STYcSJOePxJ9AFJdDuVdP2Pfy2RKo1acMtKkBFC1YTKdRmmhULevXK7SoLLqNEBvrKVdYWAdBVTyFjK8

YRTgCfJzW7JkaK8Up3bgNeHaJKX0RBYfHymrUFeiSF
1IECJwajGiAEUcXIPHSHXlF99fdWEtvTMxDqGvFQSBKa2+LmRaGY4jwatrEXJzVG0cton9NBdyGL1MuR

DgJF2Hd543PaVxO7W5ErL2pPTaX2H2oURiCpGuP0Pbk5IPi148SY2YuJ7wti3Qm6laAwAgJ6OyD62nbO

9yRL7CkF9VatDmGW7pw2AhghfSYg5VME8fs2RwPeU5
AXHmi7FeYrh1TjZdC2G7mMKkB3DjxlUYWXXxE9CdhIxsQNQvMInuUQBYTXKdAkHoAJNiabWjI1iscVBc

jBqsBS9Hu7ncLnQmD3BlJ43bcN2jKF4ZyP1SotOlBB1mn8FjnmsCYy1FUL3fu0RmJwW3VPPsc7NpGir1

UfTcO4W4wQGjN6JjdwGWEMZvK8ItqPnyJXWyDPjyPZ
OZZITvFxXwWQPiufKyM1PjwRVrFWKwmGPnZlEgL7SqD00ilI5aSZ2LvU9BuqXjOQ4je4UddtrHCk0VEW

8tg1MaUtBnYUUcOpeYMGscZ0S5iMTwW4KgB8PtCi6Gc9OmsYEiPd6BnGEfPAosGJGdMFVaWxIwHMAYOK

EwIDAgUiBdID4+IbIoVR0zfkyvPQJvm9StBmr3VpXx
Z1V8zKHyU8FhiUQqo5kFQDNsNUKcKPSmOJSxKDWLKCBzNEXqLqGngN21hotwg0zoPAqnIQ7KVIVmLYB2

WMM0DAOzIctpLL0JQHwuRPUvZNihRDAsCJLqZzl+GliufuLxXezEVcOtLZ1ujcb9GLlvVD0Qy089CLk7

NpXrA2P4YKE7UAWhPxurAL7AZoFcYDPjPSCJJKJuFo
UgMjYgMCBSCj4+MqDrZ2Wzh7WPSFAjKf8FHXFdN5yfQIhuBuZkRH7nS2JFCX1AQLx1NEbsNC2BX1AvDQ

V4CWd1JN7KoQEuBFJsZYCKBV0ZwYDcSTVeBJVQLR0+BjByZ0UvrY3vV5PxX4QxTRreL4ItTnV8yNMXY2

KjYmJoSFHiPs6TQf7UCR1tn3VaQzMySGZod7DvQge7
WI3IjV3gNYlfCRm9BK7VRF1JVF8+RDEjQFhnG9WyH1UwH9O1LAOhXd9yZT1+GvNfRC9ngwj1aiXvPaKh

MlhHDRAsAFDsENDgNTB3EGEdCVEbPVrnSDSoNLQ2HbglZODwFFIrPX0zTyUwBREmFby4ElPrZRZfOVTw

dyYLYIGlLQViZnm4VYPgVKZeDYEkOFhaOLPfPNYkKH
K2GRNyCSSnJE0jCnZoUKKlHGWzALCmCCWsSERbcpLPNFAvNVIvVorxGeHuSQWvMOCiQBcgRAAwVPKkGk

UhORZiYYXeNO6mBnLnBUKdOGZ9JJYbOFSzMKBdtaRTJZUxSIObBmbvYRKsSXSeJULjSJiyGMBiKLJsJy

SwUTSsULEkVL5tPiJwTJKpBhtbUgvkKVToRJVmjsAE
OZRwOMVyAjadKIUgJEEhEZWkPEdiHBWkKJNqJDsqDQAkPGEvAB2bRzIzDIYrNZS9DWvwOMVdCOTbygEX

PEBcDRUiXKQ1MPMbLSTxKNQxOTzePTAiZZRzXjlbQXZcWAHuSV0jMeSdSIBfVhD6CSJhXMHlERRslhBG

DFKbAKKcJNK0VkDvDONfOHRkJVobSQFfYOSvLMH8YA
BuEUErXT1nKnRkVEFwOhKnZiPrVLBqOUVwrjRJWZNwYXOiWfY9HbCgWGViGHDmRZucCWIzIQE8RUX9TD

RdPHOoOY2dSuOnJYTnBdadWULpMXOgBZPwayYIRLSpMAOxYrE3ITMxEOIfTPVlIVukFBJmMUB1ZZsgUK

DeJMXkYU4aWhTgURMfYki2GPHeXXKaOIFvpyMOlUJq
dKsozyx1QEooLE0Bs338HBZfTVENEnBrJ4vvVh3iHPFbBOMQKUDcGEGoGkqYSnVrWEHxOVebLcYZS0Sr

ZULTDZHJWsV2EJG1IKYVRQ9mPCO4ZLH0HuEuSoWGTWHVRuHEQlLFBUIaXSfVZES1JpAwWr6SCNEqV1y4

ZSAyNwo+KkwmiMLqnBsnIFOPLboiPrUOEFUGU7FA









                    ID                  Date                Data Source

 

                    [id number]          09/15/2020 06:15:07 PM EDT [facility]









          Name      Value     Range     Interpretation Code Description Data Josefina

rce(s) Supporting 

Document(s)

 

          ER Note                                           [facility]

l 

AZBLLl8gPaFERysmfS0SALPnHZ3enwn3ARwwG7HaPVNxidUrRBQzF3mvCOXMRFBMGVTzwX4qHXUqPeuL

vYm
dGXDtEGSQkCvQgViHpT3kipzq1xRB1NBImQlhdXR9KrIc7VHAcJ1VmQCJvSNJnr6ZvSl0+WdK0kgSmoA

e7aR9AM7MNZRqE92sjDu5yTVWjsEljsJOa5zGOuivRx1TqlygZJOWmSPGQAWdMVNNHA80VISXIFuPUIO

tdzm2er6ZvOy5yLfpB40/ZrfmxtVVb+2f7/Blle6FK
nRUjy6t3khhPeHodacXVXMxRvMLR5fg62gDYqgKEhLsYC3nHKKxCHa0FNxB14mJVzpOxWE+O24GRFlhm

6wjWc8+Chente+9Qpid6G3/Haiecvf6/5uwOyDGuxWlUsLjKcITykQl64iEDlFGpsCGkb2FNDCZOt4gIt1

1DXSyO9wjI+jWqQxe0c9RzZti2/OsxROJqTFRK0lrV
KkERnWSHummtHBhG0ypTcddbe2fZnAD2FcT0aXbBK/jEoMl2BDL+DbEd3dN/95/FMvVPLqy/+vN/gf9x

GcnW/41zCfWNY3jt0c52oQRN5VGWJ6R11wTYFxODF21r7IhxoLCKulSIQJZYi2tT874LdhCN/IgAakgD

iNJpfOXvvFT2VWvQOacYVanX3Xz05DLkPCNfTZaJVX
seJKIRQUhBHUCddYL4rIzrCe6CWDwBKfRtvY93WziVP3MC2tsJYeNjwELPMJTPAYsdSRVZRZKzWMfTVH

9Hr8VW9KBQNKCIKzVFLXurPWPUBCMC5IY05SF9Z16My4KqkLZbUJ7Wt8N/QJRsBEmAbLwWqwHsyEbWF3

2A/eAkfBSXAWnA/uakaiOtg02UqhkZ/DozAffgUvIg
CMaWGiVNE2IBzVTyQJQDUIHcnTkUqRGLBoSaSR7LNMKJ2e+Tp7aGjpIcnCWsD4ZRorJ9RgnOXaXCdnc0

HVyFPITmQ/7u2dTbqZ/MmruQNJ3nrmpBxdLLMFHeoOfEsNKbnS4QOALBkp2A9URgAS0soDgQg6AR1Nqw

YXo4+w50OQ8hTqImSjHoANfbzxAKXylOFFSiRYT0E4
fhwUUjKKZx4nCEgGjYMMRRsQ2WLoxKDDZgavajG5o40HdmKGxoO0Deyzn5thzQNbwwI0swL8MziYHf2i

iffDx+Y70mquthvw+Kh2rrEHLCjuMkZvqZL2RShSSza2RRFEe6EQDBkjDinldlX0W10AIaMeBa+SSCQ1

vy9qkDCQ0p6nRe7vTJT5HXRF4Lh7kvYQpyTiVEvMZk
Szw6ZnVlMziJg0w2eZOwx+D06GeLbphtiVqyrpGL7JLG37ZegrPxBhKVHOTfWuQduFluy8I0PB0JxNKQ

Gqn4ExtiCxMELRatf1IJU0K2SBgg65NKNB1MimdNkyUe7S0gCiMSihamEiPX4fQhN+NPaEI8JU4L22mE

oJ/Lf7hW8FLj3ZBpFspZ+iTWJEYklSRtJGkiNZKNku
TQr4IRdo7CcOY2VOucwbrMQLLdwaNdlu+tv6Qee6BLpOiIfPvnEmvDpoHQcWE3JGDhc1eUeq5GBhtQso

PBJyfykNtIs0lruSgTq4hQae/JwCVcFKIUahXOGSwkuGOMOWEc+mFLRkRhRoOG9IZnEmFVmDh1QAwwgD

9lHyaD3x92ULpjOR5sh1obrdFldaRrzhtJzIFFNiGL
Zk0pApD9zThzryzMr27jvKOsZr3I9Xh3Xd0X5gvJWyHEI1GtQkhYM8mDdaqea861sKVaLe3Ve0Npj7RL

5G7HgwW2mR79XlkIEu5mDdK6kf4ecuwOw83vrj6pA0gEK2d4Kp53vjyyqw8BsL+6pvrB+v36g/YUAxcD

PIM+gx+EVJg2KnTVB7WV5tnhT27DHh215JgNhvlZ2i
SYPbHdcl4uSxR/1jZiuRV0iveJPXPI2ujBRqQ8SM1gdt4t8vcJvkUe8OPdIB5dumEaKpse+n4CRMz3iy

hT0dsiXdtHTTbJXAIbqjmIUwcgKOTuQJdv+cfG6l0AZ49FcIud4KKAMur6zca9gk9wKelg2ZatZvjl9v

zxd5XIuJy9LPxqNDL4PL65Tf9FxUogAJM7zQqkYbg1
wpNPcDb0qTWXvjYxcVqhlv49KnsayQw579nljb83j78v9yBegPFw/V555daDdLdrvsjGu9nHTUn4kIo2

ua7c2M2t/8vH97bMj9Obdw+Pf3Agniyeu2mGTQut76qwR/CX8N/1T/lzAhYEiTt4KFnNBnKDX/SC9oR9

TsRHiuhHWlXSrJWQwYoUWgv29JMr+EVjmMmQ9dIt+S
mgYpAygs7WslfgEdW441YulCZwzFSwF57Zv8TEbuupPeMaSGMd01tJmTcfCUn+l7wimnwykrEGOU8NsG

ZdTBqLlo6+iK6PkY+3ipmZofLcZ0lSwwZpMm18eoM+MuS6ZZDKewzVRdGUj/u/s4tE5BhyrPJHo9IVTh

C1lUKObaIlQNfMS9O4K7+pEeTNVI/R27Bo8bqKxsB0
pA+rkMsQxuxmCmVua+pVaao9tN4tjyKmKnqhQN3iiEGcL95qbHTyG76yUf7+osYr3b0dJkO6558J85dp

l4YPze9vIw4puIc6+/Ps69ezMEMAgTDdgXN3104Sqr/z/dC+8cjf9v1iMOfrTwDyika7EWrxcpNPtA

+DJ7lyh8m/VGJScrQUXcotHTtgfeBUmVhZVtnUQc+D
anVD6nVan5f2BwxAZWMVsxd/TIIgr1CzgoqUhzd01yR4gTIpvV6Qc94t8h6omSEuGkNFwP549hxGPpQ2

HuEn3YZ5f15zm2xUlLO36oKqQgnCJdgAUU22W7g6UMU2Pcs8qH/le6q/2iY3aPF4noPGxu9jyZrydfky

jw4/fosntXC430vNkzKixTlk/rI157sY58B350ku3r
5W/lr7s2zW4As4YpOvqEX27tiAd9pZy54d35Qb4voB36eXXqSl6XfIe7bOr+/Z3106jAFx0NBh9xdoOG

em+ak3SIzEvcbx77c/1Kg8cBsRos1lBdRphSLG0beYf+G1m14r7uy2uAS4n8R6lUDR3O5DHPTlsgnse9

ufro8eRh2u2XdhZHHnvkK75r2hV6zi0Sd4WIS/9nA8
dJz/oH3l3fK7uijP2n5fN+x6gnpS+XE8qwDp7MxKw9j+2s914T+lyKeqrY6s+cQUa+rVb8m/jR3Xo2T1

UYBmXTO2lir4Pr2b7w9DnG+nXyW+Wp4v+V8v62iXZp4//sPmj8GFoIXpN7w3K38Wv5V6e/Kd0bu+Re/F

Z+5H7a2sZI6B+nDqI/HfzMmYBuZR8S3tive/aH7p+e
r+2ktMekeO5OBPCrY1XkQTMS2B5PKJRaV4cFUTiB5qaRFiQfpuKPbSJmd4S9uDeh02e6MMNS2XS+CXDY

KQTYXktiOKapSRSDlAtDiNmBE0T0miPqX9AQWQLkjEogdhPgOPa8uEZYy7D7u5Vv6CRL7sn2FvCZOuFi

AkMQ3ospj8LMUoa3AdWtS0FtTQOX0vp8WlOxajCXTp
NolEEh9BS1FRBTCkCGS0CHWpXu1IMY7vl9OmWvvpGECfDwzUSYbNNPVrEJivSAHoEML9WAJllDPIIRKa

G6QwfVsnJYMlZA9FJhXkA2yprjn6nFM5UGDfUxk+LaityWAuGG6HQE56wKMac0L8YJDqI9awWVQec32k

UXmcXRMWNQ9jZBAUWXpgWKvyBTF8SzMqxqrfYZXrDs
77jEl4zRNmYMAqWLuvkK7xTek1KmFcy8XiVu8rBx3umPZiWo2CTAVqAyaKAOPpoJ5aiaZ5xvOtSULhnF

CoFs5ad8h5PquqZg6cGm2eVUb0XwQdZcJdOYToXb2coL82DUfhbxHwWf5PDWKaDovGUGTxsejijNibji

N2aQurlixsIc1wcTN7tJhiG6Y0jzpqv7VtV9PbT1Hy
QY1nkfMcAkPrDO2iIUXfKbnkYb47mD3uXo3WIFOdDyVrfb0esU4zdQGdwEQbpQlrun1rCVC1Y9MuMnAz

bs6rzN8RVBLuMemxpbg9KCweYtwskB8kpp88vkwpDFH2tJGzW7AjA3P+CjxkYzpkYXRlPjIwMjAtMDkt

BFREOBn3IEL4NYEbQOR3GPI0R4TwXlThlLL+Cjwvcm
SfHhPja5GuxHO4mH2cQgg7asJrRtPyw5QtcLU8jT4jLSmxjJ7dBiUpRc1jcOS3jMckS30vPcUvy3DrZx

MapM9iIWZrVZ0dDiGtlcVuMkRrj3B2HXCfRxu2xTPcLcAkj1A3A8FxIoZaBMEiURWVO0KsNdIon9ntnw

XnKsL6F7DgPvsTbp6gqLZxjt3KBTMrBslUGMSJAYRq
hH4cXbEzEJhsxSOkCgLCKpZktwJib74+YhnskbRuCzSjh5ZxqCV1kW4pIfs4viSiVsKqf4DfgCK7lW5c

HVcecC5gWliwsU8xeTY7fZxlI08cRvAlg9BjChUziM44SPDgNH6nEeIxjlAcInFaf9S0RARvPqm7oN4o

LfRaFAG4c4RLn20tWrGyYYFsQOBWC8VbDkXpl7ttpd
BcCsC7S0ytsSjRixTnoV1fJL6aiR1PTRygtDgXJJYcBKK2BJYpoMI+WxMpYI7tLT7fZEZmFRirYNbyEw

4xYOndTKplvM2tUw4seOXzWOQjXDN0IR1SHIuubHdShtHhfAMLDNEtPpCeLtLkFVhdDTMOQEh4RXC8YT

EpFGC7JLW8Z0frjHnJgpGdfBRPMJUiEfi7O4QbEeaL
UUBfmwqinUvdzh6HPP5sLCY6ZiGPKzu0U9l7wA6sjXK7ZC24X5wbHICgSEFpJU1aBBQeVg9+CgplbmRz

gJPfTF7FRO0yq5VgXjncGAMzKimSBAQ4UvTdKJ6erklrTyHaGE8uumc3XZtyWI4QMI6iJD3GrXUSDMEt

IWnvQLJlGV4hyfEbxEwzCL6BQP0bbNfyIWQbGDCXZe
MkJ8AdiYYrwyXrIhiodEZVFVEgAVSUZDXiZ3CnaVwoSIYtUC2sQ1URPVJjW8ezeJfvNuXiKkDeS9qdmN

ovrTX1ZGpbOV5IbMNyTTDdC34rnO1nKI98HDnMREJcH76sx6AMtPZdKCNyCKD3nXZsU9HthUg+PgpzdH

FjEW7KkYovwVOAQXUGyaK+tZ3OPcHDJIFPPTQOCGQV
HQEZMWYHZVCVLZCIHKZFNNGlHSXPGVJYEV2wj6SoQKNqEcDxIG8ppmtcHmZmEC3abhd9UGzcnlHsWbqT

JQYcGLQmOpgDRBxPNTBcTgNwVQFeLU7vOjWfL4Q0vBExG8cZJoehN3AFGGPaILBqK5GvTNP6MDUiZnkj

IO9JlUf2FUCkD8AoMJYiWSAgb9DvBtGgR8R7SbT9rJ
YtS4doMIakFyJeU4otSAApUXW2JRslIY3PSSdcpHFwHysNTTHdUzs8l7KxwyGruXFroxXmkKH1KrGoR7

ZntF9wP2KqJ0NrSf7PP9LAOYNzAWN2KJRjGc7CKMOtB73vy5gmIDXyAEGBZy6+YsL4omRvjMc7iY8iRe

xV7/10z3874zfjr0uGOcK9mTokSbngLNvKVQQzK4qJ
RSqEEkqlvZeGESXKqsjMLP/XSTwumUc8LU7L8/+888370T11rTo93/18Pp/g7iaDylmPXAJYmo+N6urq

zyiampr/kYnTINYRpg14uTT53mdtWdZygCEFhrzOpbrrc8g46I7iqgDYTVc7AIVTQmC8fOR6YvCBCIU+

MuBRLL4Ai2nWIzqr/V6QEktyrET6ryD8q1nQuxUbrd
wGRLLf2DRrrBwpCCpSzYOqBi0gOWBCBoe/F16/ywK6yuwBEq2GXHVJBYh+qYWkX3p8nHccDVOZiTlRHs

SzEsYXCOIke4CnuKmp0Gjrq6OtkMRRZfyqMTBUb9BcEUmWulgo8ga/yGaGIMYla8HlxGyy+AnCBNKAT1

VU0kD+IFCHrISkEJnMUlhYYOE/jvr6+zsE8FoYTOoy
NMfenvn8bKBIrAduiPTSgDSdhAN9fOm+dj63n355Uuop8PxH6kl6gxrDa08Ig4fbJQHeQ0bkw1WymdJj

3751dGlMCyWIPNnM3pE/nXwWWGDhd+Jx3602Q+kG1Se4IEHzNfS+FgQMFWHZpOfK3eIRxPGIy1Fl+BUU

YcGvpHi5ITnQh719DwrclRKmiuFRnBVkwyjpNKJQfY
GEYEH1LDRmL1sbtnoXQjgz5pG5HHPRSqLSXOerGB1Mte23Y+eDBRZY+C9jqgerMB9ixYX0i6ikeuHUqB

Z0be1gRAOZ2YISjfHpTw27j0WScLAacusTazjGcCCP/p+dgwl2p2cK8RwBdARWZw6rqSY1OEBhdsBPCJ

DMVh9elbbYRtA8/Ure7dcaC/90tlhggYV/Vh9uLkcS
P1+EpBS9aBSdO1FGZNGDqBHm8TMiyyBKuuo7Y4ujUhJAaHYCR0yfmfK9+/nn09Ks2d4mfBKyXix7+Pps

8xk49oypr/wHYDnwd5RNVGRfLHrbHDsqTAOPmsCUtbaW65uo0KpZEXiiq1+vt4/PFu/tAQH5r/L/lTz8

UlRXV1+6dVqz4iIapSdngco6Cauh98+qPMYWFKrm86
79342fNNSx80qA55+yjPHd+/mvH2TrY1oOs1eISNZ9DVIT+vX4nHf79+7/lhH1ZOKtws8mhDpgq3FkMG

/Tbb58+KA9HhJRaelta2y3EcTsV13IbuR/DXpJA7+hzGTjXiua5DaybIAkrgY8VG3zKgKdRBFGIzbKJ2

iCgTS+mn08f9EPr6sYD+dz2BB2pRmklIjFOTiTHvR6
A7TmE4OwWBHdeGp4AXlsj4+uEVg0zSyDa5V/cCCTdAAEDlZEtTvJ5rUAeSoScgc424nf/o2s/WnvAW5w

HDysPxpvzbZAruG6Kop5JFQI5HXNHJihg5JElXUq7gChOKC1TDDm1M/YCo9DNqG74fBzrzkzFS9DvoKO

V4+HRILm/PtN1gXP9M3zJLTz2mH2F8TcF6+5fftnIi
9+MucfNLXJL3b78odhrLnXpbvxGcqESuYKFbpJuJVfXsdrKC5PHhDKgU83avleFFtmARbOF5ADtdrJI9

mzU2F4fQKJuKEtG/9SciKh6NwmufvRSMzqwSVMBwLlKARlfuyaL7MRk0DBoPI6qav8PhEMZZ5jnQBEF/

wlKqN7wkNo3oUnuzbvBqsc38znGIBN2mRjZyjFghq6
HRHgoybh0wTxqa9d1ungqu6cSM1mqIepOtUa+gt54+xMXRX5E1FFcVAT7FuLLiwe8tQRMo4TMWKlmv8n

1qePH/2uPtIVkrfBKRwBXP4p6sf7YByylsjkBmXwBxYz70A4A1UIMOnsQO31TzvmnP3/Th0Y7exgZhJ8

tLTFTp3uX21/FwrpdP7weqU59hFgOzhGljit5Y+RwB
1k+nKQ2GMnDfQKI5nr0iZb1LwOhZAmYLheDoO1BDNjm3txbpe//+EUUSqgA099hiMkyourKU+Ls9jJUv

kgHOm13pz4Nq45YM4ps4pa9y4eAJb0EHhJtXQhR4NZJxm1GZxDykrDJWyZncXhzvZhF5xLi8O28KxHs2

/MexsrfMntg49XZuZrUR8jsYFAV68gUx1sTrfR46+X
zR/Gi+pOmuuI4CYuWxuH/9TQPHtGIERf91BHqq+G03rIZiqiMjeBe4f1blsIjiDKzqAc+Yjsj7orJWa3

CcvWPqumK4H93yEwFwtZ2bAMdR4c5/mxaQ5DSOLVmbHGH+kh0puFlL1GS+rJ4X5kD+lNCJdwg4u/XDK7

3OerBjAxWw60qxHbZEduAcnd/KD5uffeKm7uEGGm5D
65E7N7BdzG+ERvTLixlBERSQXTQb3tqdy/SrM7qSbwgD/DKyUqxsfLBwodwzBPHz+IFAbEDMhFM6AyxM

P+pSuGjCe9xw16VvNnisgArpBC+CQG2+Y9HEWExICofC79fSq/nqnjoHILVst73xlxlLP0uPn//iCHWZ

pFKskw7k17SNAP29yOiJZzCtAGB4DiHeOJTkP9wGg6
eOGCtroGyhuzf0/bbkqb8RCxynppIm6pqqi91UOcX3XkY4N/e/dOgJvEzcb+d7oAiM5RG+gI1aSfr753

QCPm4+Sf7jNmY93ut8++jTpyBGpEXloG8/hcUaOAKDMAJDrrLhpqZn60MJtf3so3AfkXHGZTRKXh1rp6

cxBGSEBgypTJdXV1+Y4l4brBV449pUx05pD9D21U4C
k3coXL9xBmLENP2A/MY4m5evLCEkqAwW/HUvFjwHhD+kc4C3awUqaubPICk/K3pGAs823c+9n5TtlpH7

aefmvfy2EFwbfnMCCIw7kkGcileX+8c+qLU1zPNmdIfGm+9DGmtDP3URRCuwesYgk/7+mYG1V08KrShr

GAxBFwSnYPYFwADoV72NGcQfZP9cEbUpV/WXn5+/fv
Pn34BF+/vkY7zAuiqUd6Wk03Nv7a2AsY+x9v0G5pSBbZB+LGL1/bkI9dFRjpbAj63P7clvhtfyc4L0eK

MeDXKLlhw97hUHDiWOMOfV/K0fA4cWEzglFp77QJawcza8hfiRdKDv1GdxVIsuqBjHxtZk7rD0KD2gt1

Qqpn8xBlLSBs+4qdlt79Sj0Luwt5EIlrjh2l9/LliN
OezDWByPSQbrkJgALG7ndPXdyCLJpLCom47EDmet8eBd0v9uFhn9A1xpcA0FgTiBBthWHz9Nsej0iZqX

J9fi7EIF3xEHzU2L9hwUY4vBtmLzlRraXW++3FCe1L70rmmm+ymePOLXwR7uWpRUa2XWOxW7v1SUVB1h

awMzIUfh7sQFOHWcq+lhYkPj4+LnWmhWXKLq4qT2uD
PX8+LDQsJDgocldkyrkUqBeaYL+BN0Kt728baVo5aCJUvv0AxgyLQkTLvJyTZ96p+exZXpQ6+sIbndQB

NQzT4hNG5Zgq/4GlWs1rLtBT3BHj49xBp9LHw+vrtavXDuzb/zq/oL6+qwZpeCPryZMB/QZAUU+xn9KO

JB7WdrcDrEJOMJFQkAt2HYNJtJFq7hJjOuUb1QiM5U
xsiKecvAKIPRBGiJdfD/WR6+OGz25kW6029dzAMmXkRkSKAQkTreLN1GGIM9/Gi6fWOEoKNF2hgUv56c

ka7fpm2QJ19flhUYEBWk4uK4jhVeE8JyyrTgId4H0m5foLXVv3GIke+kzISMsxK5RkkZVLHJx8tbISP5

eCy3pYnjUnNO6+BWmZDevX0/cmLLbAHYFqVKoAiRez
7MFb7tj7rntbv/X1O389Op5eq6m4Cj/xqwc9fIflOOYrmIx24183tCzYvrmmIl4++FPUPnbf1Cr9R8US

VopQBwcRAFn95kGTNrrT+zb8FhKrJqjneKCRBqjotxFp150jlAbc5OI9GD4CwIRB+/dpG7+fr5+UuBRx

ZEVTLUlUPCrALqvRjAgWhKZtfJ62e6UtyqF2ls17OX
LMv6BXzxcRx9uP+yCdjhm4MXAW9XMvX1M50uUexXfPTqb0D6ISHSwPXAHhXVaDRHUpTgBe9oGsJXodnS

MkzJR6z77DtguSghbvBFbfEl7xKgpzKZBjc8epwEiQSWbKnwUqg78gaxxMC53+icXrJCs0vKoXmKtP7h

maDbazrkVHWVjNEuxMIBhNN0GLZAHo4lvXMSd3507l
IyWFzUYBpcCzijJyFoMH4/EfPniIj4+SW9kqoIUwHmMIevKyXOZrgJjT+uxZsxrq6/WIJFr4F86U+6tX

rXZxdoY/WBLVGxgzyoksOkvujUnwnuMZrKkdnf8v0slT7WbXhPwDqQPNr+17NRW4tTGtJgqIKEgnDhTA

6p5nXgswFEg2jXOIWiO0IZz6bYBvJxmRZmOlBWkrAG
rl1CrRTZPNlOtkDHzX1rvCVrwmF76B47hxcu5WTUlj5OCtjJtN7TNV2wYPKODMhhqJR3p2LVKt1Jlfxc

lKikpsKHVDZVmPHAnpgTrl4+JGSNLcovBNITH8/nj3SJNqDRG8zFLxoSXqDz6cLSYDBHtCk4RV8Lfxq/

f+2Mb3xPkAskGZdH8d01fOz2uwuMOEBLwsbuL83Lix
8Qa25Vg+cxS167ODY4acdvBD45767+1TLqhhoAgqpkJD2zatAnsqUoKtolO3uA4iUO1fGQ3G3xfUPY7y

R4yC9A/q3x/yNRcJED6IkWd66PcHLfxtoPDpffzPOwCiB9cNC0wJ2LwJFJFeCQQd2WUs8cKHQpnhDD8T

jjJW4wbLEwHD+rXlRT2sQIU2cZdN4M2lIRg0DnycBn
OlB9ChhNR1jrRddRI0fw3S9NjCcu19ssC8Qy8x9ZKQQ22KnhQXuLpzzWImWrJQ6s596sSn36pz8ouEks

CjZQawYWYpdMsTX5qMx90taCfMhgkDxCDVOHTO1+dTcjg76lsJgJ88KcWnyQiCYH8vk8G7AEVNKZpYOC

WI0Us7Jy3aRk41yBZOsYWxVnJwxDUjdjubjrf3BidG
GiVrakUgAz2g5TG/63suIVZYFbNc49ty+BwweUgQvBg51lANmfDqUKOD7eO1t1R1fPUGnliUvxa2Z/Dh

w8cNDV+jZYnk5mmkcyUwDqRZhgz9x5zRLLSTr0xESNq/88/SmI7kjqeJJ1q5YjoKEoX1W+0JKdewzuM4

B06cKa7oYGL4+U/hxY/xu2e7iocHG9J7MQXudnzO2h
ErST2E2oOGMFBnIkZl2Ue+fPECu3/r5k2n+QtA/aUSB87AwI/VYXSy0M3hEE5Gyq23/vHjB+w+vHfBPE

eoViD/pYsWf+1WzaBB4lDTssQiq+gvJSYetGMnblU+YfNUS77TiTRMXv1pQW2Mui0+fjISXfIGCGaPHz

8+uP+VytMOjkzjhuUacqs2iJIZpJ+1exMjU57aXuZD
CpUAov3fR0QGgJ//CmUiD45oZ9GqjFe7CvgP5nZNMwhWlW3NcMOe3W6EhAiE+SdKlhwf5yMq+C2SxJkQ

WXVK5mp5duYaZdS4K/0l2jBDKuEh4LZM7n7xfpETJw37/bpx/XoYjoV4+W1oRsqH7oPugzLyZ/vB6NzB

Ntr5s37Ihx6kQ156mYFMAci7kOmOVDJ4hVj0KtjCGQ
15xISmfXEZ47W8I0RSwHwtIy4lTjSuZX3kcub96dpoTiYAVFMFgdB3tHrdM0IFkNAgYkd1kAJObRg2tO

HML5L04PIaR1PX8fHgjPame5Fxsc8/wGVB5+Xk5DR//ePFrdNr18/U46VhygaTL35/xopLUU7+TAKtQW

fv7iSFiJdsZgCLU/JjnPkp5Jjpj1tHZBM3MNLYWOjQ
4ZJ04uaNp4ukgHUcYQ1ly2JL6Tbp/0H0l2k/VSbBbc9GqRgj3t+E3kFxDYb8A15ivv4Hi/v60Lp/tDQ3

zf7eRCKA5WcrrChGPttPmtQdwDd9A3ulqHnDxHm7r1Pkd6aZnNo7QS4VEfVZrOfCkxGOntW6nsasMrxm

mZuG02hv9oE/QnuZwyISpMqVVOh1f9wTzVYP622cvm
ooLKUc5FBAXqeearI4UbYlQOdvC6dyK6HElaVeKXGcrjBv6EIFK8R4y7vSLJ5rMqKRz+7sMxaL8mJwC9

+05+wlimS3O6Y6Oi/bXaUaxX4C7Qw55tyon305WWWuN1urLs8VDXDYEjdRtMBhQa1w1MddIQKS4FHY1o

5vJZIRZIPZoxy4+bBR7TaWFVE+5gXtJ6XIKzmY8sCs
61wJ0f720eQfh8Vb9m5an68J1bqh9ROOdtgvIKJs2ZSqrbZrfpWA+1iNtPuUOCRq8FrL/AIgLQPtgDjd

jjTg1iPWqeQoWwm8zjKkfcE/bLXFVfc0kw3jsH6dBGL3sc/yZrhd4vsBjoHAnQUOGKIPaol6HOO32xfZ

QPDJo19kJK7Kl0Oh4pL5BdGFYoz2NV9cBh0wvme41B
kh++XWpW1jNfNY/VWIX1Jl3VXbVv3xkdzlj6kTTTQMqm9u5XFb/nzK+rfDs9Bntmic4IwhvQC9TYPeO8

IaCpD+ACfSsJPPFdx9q11dpb4bqCLKyhlp7Bo9pvcaKDPh/EXYE5yncChz82etMENhi8a/8fzFQNN+8N

SmThL+Ok4ixGcewICdGsoNPy9bzvor316N1eijocBG
u4hGYdAvFZXXto6eDk1riX0KUDnvADolMcP+UQZLNCCDuQP5gdxA9JbSXFfW9fiTscsbJYYwpTsRl9v4

nUSLGjLEfVj5aWfg+81gPm04ZRWbbS2J+YJ3QgPDM1pt5n2/U9fGvVUIjPKpyYRssQGxv+FAoRqqalg3

NVyOeXHbo8F1ZOts7Z/4+ztNx9V9e31R5lOIGsQGni
JnUMAmHyUkuTnvqFHIrJd74LFDVJwqgfCRwTGkf1O8O8yQCrb//x0XTJRROCYb1TZ8qezGcajyStLF9J

h9HBRlw51bhHHQQcJi56oxPo7gLkXAO9+cFX3s+RV9Rl4v751eYzh2PratbOiuLOnr7nA8pFkJlAqEYR

qOBYNCNyaCaelhf2CHfBYYlB+dJeNDRo6fPnYpJlet
Zt6zIqrv2wDsKkHzFKiYSjgd9HpSRumoTMB2RI2m0sKxgumw9l1rW74SK+hbDuiSnz9/mwemhLCJ2ib/

U5E0ev8cU+PK88Ada8HxgWYI5J6p1hWpWtfyDQhkpMuLlC8e6mHYZ+kjb/gJ3EsoHLWw7kuN3RtKkcbG

afoKBxx9MnLmYQNnN6DRK33+V6Zw+HpvATkKetm+PX
t+NYl1B2EofqBFrISIhSKbI77muUlqiVHSNPRq4q6qey+FWNyYU9T0PkD+6cvFgZiF5syTH/lp16e2Z3

uENL/a7Z8KS2n5tfLCSZs44KKCuwGVYDQqOHveXKQvz3WEiNQh0c/K//TjX8B5Lk9QLyB8+5xXjUG0bP

HnIyj1AQ+OSRnX1I607urM8HSxtucHvqlbGMuKrS1+
hkLEio4Q1QtTgfc2zjq3nveAoQZ4fuXnrS+Mj/CDfAhNwAtn31KPXvOtBjfPCl7kAmrstIQD8iBtXJz8

xxfzI4EjdTFJDLw4GvmYwzyVU9ugDZPmQOPCQNUnxnMJmx3houKBzMJtZH2W8K3juvYVPkR/AkX5yYr1

nKvO5vhM2azci+AANf/LeklsVr2hVkOIp1Ll+NChEC
7uXITJjHzZxDgay6dhy53LpBsIqfzjcsdekQIB4sVw4JtrtSEvGDhBKh4zAw5egN4RnVYYNvOHBc+Pkp

t6hAHPVpccvFdDSuKe/CoA2iG3iTLHQwnUP9of4ZkXqkgnBswAyIDEkBcwk6qvuDoouGSgyDCvuEGAXY

EfKEEEUiHSwOPoZr4NA/geK1nZ6D0CBMtissLPElZR
aMxY8o9tVyiMRiF3mOzY1SIUL9GMhQ/3XFqhxzgVFox2zidJ1i+2KoZRjdba68FK4362p+/CiI/4DJOF

rWlK7DZ0AYx8PM5ouJDHx1+mfS8hkb0y48wE3rMGFjtlBCyhtOduNR4XBPDAfWzsAHFI/Dvp8LoneCwM

BbQMM3NB6LJatF15S6aWsM8Ny7WKXhWYynFUBoyIy7
i81Jsgjb+WLdidH+GK3XR8Dy3QWRCNRDeAYgAtoaNVLmtKfBYXXyIIpAT16iDtdnbhBEuTSMRz7UlWjP

mTtuCcerdzth6As7mAioCNt0XKd5Vg6S3bPmlmO5qdjCKejl6FUTLkgW23eQAWVg7iKYsvCMlkeLm7pW

Uso4zVi3hsLnCZrbC3Ue3lQsuDUbaWKIS9+nrTg2En
/kXj3LIQA8Y27VG0Q75LxCdvD12ZdRGDe0ddZkl4hlbwp+ytaFIZUCTibTV3AkRTEzyYRas1Lby0pSvO

E/N9EOGNUNoA0Nm6g/wvVLDYXBWhyR88A1kHFOAKFK77sgv2xlvXxXzhWgv3zN19rYW1LZz2vaxanJ2z

V4Mt4frvgcNEtwwg9qOFnE/Xdv/e/b60MP+TZk0pc0
SjzHNmqKtmu4oVUDN5NBdsibWYn+Lbzmz116oEeXiYNRRJhZGzLIOSe95GC9nYm3O9O0lLgGWN7Ma/fI

FgzLGdgAv2Gk4nNdlng46yxVUAu3cBwYX0t3SnoXjdF0vYMUoazFNx2Dp40+SWCA8uxPoNylsR9+6VNI

tkYAxRP6BI9m54Q7114dTyxd/ScmEAcCEOxRrK5a4d
wlfqr1+/4mQSk3Ky2oZIJ4aCl6I/Fkf615dmNA1O4gKcFeD9cYYP6LEqvf3mUZuNPDmqMw/85q+yb9Dw

Ve5Dq3bIo0BmbKHQXxxuNP6fSdDOxNujZGiNMRZefu3doKZELj/FYannXlidyMl2QhwgRtUxTbl7Pw1x

RdpxuiR19wQafSPdIFwNzRaGLZPVI9WuzlLS+vhe5s
S5on8jnbma2YU3VYmZQwB1i1d04WR/5pVm9SPLavmVsfYbf/7s+VDLHNC41AiXAIbhagPAPAnQuddFwO

RBXOx9eXJfSOkSPRJUJXqP8bpW0wa7auS2Ge3mocOWZ7Ww7RK2+/mTJH4a9ADM2Q2jI0xogQViXdvbEJ

I4WSIrGor0nG7JI6ONWJqPjTJP8CGX5hF6aWe7wx8E
SzgYUxaRRFlgGZzmLAJYeoIKuDSpMpvel7pRDpYYnDQnaTSxrl1vksbKio2hayu9y7KbKhjBVm1B255b

yzxkSUmJvBwyQSYhIoprr+2/iZoFpE7Fi33gf7jaywNjFCbVH6kzwSbfVFQT8KDAF2gtwvnu0M9QCjw+

hzd6xe/yYUFreFEXT02VSeHVSI3gKAH8hu9O5/zFi+
Qg2e15kYvKm0o8puiYuEzn+Ta/uI0o235SORB+cit6OdqEMkZ1kOHYHCSkdiEuC7ieotTX+KaNnuyoY9

2Iiuj3IcLVm4fXVPZt9i9mZbvGyKYiDyeM1XiFgtgMsWWnPQhBicy60ureGKxAU2BA4pQwK7b21POv4m

3+vvxL7hxy5N+rtKJN6gsGrUr9R0MVRqMhJsOHHkXi
1NePiIjoae+Ftq9fX+Ue3UnFIAGsFpvXeTNEBvR289LGBzP0oQqTKzzrpPco3BYmF9HRWCG1IZT7bW+B

tfwoMeCKl7ScOEX3DqZLHZ1+bfvWNMz4sS+iT3hjxpRzUIdj5u/JmSzafmQTNA0RJTsvzK+D7OPJLZQA

YASZQJiH/P07kIo4Ey3PeHmdcIzOR1SbAe6bMOQVdF
Sp/2dHSLDSaNB5WQnXJ/PQr8nPCAmsv8iX8EI63JvITy4RA6nr5tPEY+Pu2buawPv0fW+RVCoJy8dOGC

0XR5L7iw+jkZmZYTx10BGPPxRhQCmh+DLGP0FvtRiSuFJA7WJxr9mmDc38aJg4qaDwhe5W2s5WomWckF

yNThO32TmZUvXTf/qU3R1Tpb7qMjMzf5+3D+6ngZ+q
8xfVVc0EfLRkcIgHZ3jAZVDVKYnIWisbcAI+if9MdWgdLAO1RkYXHARBJyREKm5q8M5454oDtUX5NlcJ

yPvqV0Bg1bF+lGmNn21LQPJcSFUvfkQOGOudskeYGx3IywCB1nq3sKjiXFChw/dI0MQPSF0i2RbIeRPY

EW/L4qv4ApaAKsL2+FmEe0F3thY/k6fC+AUKbwxdR3
wv2IdjyM4SrUXokc8N4w2lieBWc/zHeGPI9+kgSvJ9gCTAB92PMWPXxyfaj7fw4MZrzZAMQgUQeUdYG8

i2k8dn9/I2frsAwWKIBkhQ3Lamx2mBZ9anlXrSPhqaucKKhMFrL2lEGUtBzhe09Hr0/CMIRquXeGk89q

2KdweO5wIJIWIZh5QCYRDDYk4tw55ZPJQUj/kyD/wm
L27ElMKGmkz2olbXdbX2W4V5nFjasymDfx9IQyZLuPYDTs+ZZsMpci5b8T1w94R/fcznn0xjrwLmlawm

eEDXWbpLDO8FCW9x/ea4aYlSoOf8zlKbVs2KBgbjvAg+zShzWBZ1sypCIKIaeZQ8YQH0+XWZ3MZqLyUV

NkJn4t+OeZNycXCAXkO/EcOD2WKlozRD8PtADPi+bX
gayCZXlXkjm12nfSd1v5/TaErJlUQmvsGiqGGUC5T1a7e0pDbv76DQBxPDIlyvjkO5uZ2FsTfzepnpSO

9GdSt28gZZs/JHvmFnlJqzDl07KR1T+79ot5AdILAaX57OiUWsIDAq2lsV9IFJ0jDrd7bw0VekXVYPY2

c8TgyE7Zeyr3ajGkYi8qhRUO6Q4Io5B/Kmu17lhhBH
Wruock+6sG0SEdTkfZjH43H3lWX+G9DTT9rGmXRSz76K1PnTy4hneGjrRAtIEufKL/Hfl4e6T2iVnzkS

5258DLuikk0b5H6wKpVkm7KXXG+o+RJHjE9eDJdGqGMTtuiLL352M5XyI/KGFsgVXjfw2bD09bQSEmjN

rbzo5djOeR//ipI7w9e7Qm/tP0ODy4KF3ipghvxnn0
Wzy5YtrsItojbJHGfQdq1M12k2+kux0a0DO1SXI8WWC1im+WWXt2L5Z1Tg8+/u4xKFBzNjY8MWXbmIFj

B9YwXG2VDWySDUwDDEUa6rsZCoL4yECUkfkPYhAHRoz/jPcwFVwhiKuxG+f/wQ4MECsPPs68pgqDPCF6

TWK45SfUkw+qR29pPcikgl9x/TzUpDsmdOm/HhY+9W
x7y8V/5+6cpWtuSBNnoxgmcAXXHm3gRiaoddVvweFgBmnxPFCyjaGiwrLUitwKbGWLVwnfluak4pY0TJ

kdXnLGqPZUozRjxv83czOI5mB5MW1xEmL45+x8KJbDy9jzHovYzw4PZnWJbZGyrg9Xf140gG1rWUmv9Q

X64b5fKJSQPYK+RPsRSSSR1mLu2CO/F4HWpNxhKvDo
tvedHJCSZp1RuDsTiH0Zk7zGdAjc7+nFHUz5BkGb9mMkdkH10hvRHQFdXGiEFX9+BTm15EueHgb61wKV

Q4JhYWpsr0LCYo5xPTIc6hcEt/e/aA2ZstARw9aiOI5hWQomb4YkGREJUgiTaboJVANfwjOPAw5K5SIv

qwKJTAHjUTPBNG6iGLM5DP3EU/p/s+gZcyLhmipQHy
Fl9zd9DApEZF2rVGBDXimTyiWX/6IF1xcWwkyqMMGEbLPpxoJE02pYumnHFUUj0mhI1G3M5QZFNGKBje

2CamUbkVxUEqVGLFYC31L6NAO74XnYV7cE55HO+Tl1pHaKaYoBbBKrqfCUIK/GyAN4dLD9OJlLpOUC8e

hXzH1X39phXuUuGVTaIF5lliaBVOmAmw0nF4EO46l4
MlFuK4OSgDFlrXHcs7+Nn7l0LXojmm3dsaEmVQ2oJ+H38NskizbrYb1jl8dovlxAkrUcY792lpk702N0

FhAechDE1WFjjCV5hOx6QL+m4YG1jwOyUzRh0va0ajR+0zBTcR97ibk3SXpIAT4Q+GuwByGMnZMxrW1f

C+ElU6K2n5zlNIX17UdtmsL0eoikZduYwzK1B58Gh3
eAablS1XD/QsTUJtMIDUiu2H/vXo8fMopzcaBb/OLGZ3w10DAmrMnVM2wbrt6bhXKoglrEIjcQ4ncAWO

PSW/g4KsjXOUopARBHBS/4Ef+7xTKEg+M3BmwWMgd4AtMZuNLgYBHerBvKbMsxx8YGNMDCXfh9CmM977

k+ebonMdAxcENHCZUu3Jkmux4ewCdpVJrDkCW3AF+2
/xjemlkxk1QnthzQrEH6s+QlmrYOxRoYpHeUFqZDDCfaCvk4Hxmo7+B6DJq5YoYY9eW3/zLAX2tyQrRA

ivB8SkdrwNMaymgcRGlIYw4WYSnojZjZTn6m6OUC+ykSI7TNxh/k4l4vGnx8vtI4KJMGgpRmFqLxTbmj

fK4MrmjMgNeEk0rSG2uxI5Jh6GLTYayKYXgd/LCko/
cgYTTfKmSQ7xdEfLPLCfPd6OKhoHaaALpzx+l+24fGl6YaqoDQYZrjAOm9BWCFQayVPgVNFHBwUPhXUR

iMLiEpN3ySHjrIxIHWJvm/WJnMhGylUJeOFE9vbkL7As4cJKUjDDyExMAKr2nEbqVbq7fxx3hQzSnS56

++SPw7kdGjkD9ZLLurUHuRsx3bDvjMZAJMwBg7IUqB
yVcs3lRKI1VU7XFbXWql0kCLNQ7CUrE0x5KW9dQPWQpie0JTCpSXAi21xGsbYpFWUfGztVcS1yb1CFQz

NJCa9ehpL1fZPI8xIXYKHpGCVe0w0+koSOkHPeWSUWPpAFIQr1MgiVDTUOBTArJUheRmgZKnOsGy0dbb

19TjCAfePVw3Mys0S6+Swa9wK9NlIq47W3qJvE2kRb
VvQOL7R1+p+gGLHkQYsiji/GRjkvN6Mrh+IiGttLcshyUB2WgwknDHvLaenf4wIbxrd/Mb9yLapqMqGE

DQsYgQD/WNBJEYSsdcjpOV7Ni3n0j/aWHBWQ3osy+SyiQKI01C5A3bYMFfWbTIIVR7xNwsWanzYUU3w1

cOssZmgaK2T2dZcDJSVnm1cpCPTCWGIIdg7QzH96Qi
ChkeQ9hjCLRyM4Pr06N/YstnilYFUN3MBBvKGqFudpFSak00kEplokQTTPKQahvyfoClBlBm907RUade

QfrFgLdg8pRjvo87Io8SNCoVXj5bBOiCcqpZiWuGJnjueztljlH4jIEGL+CumofeN/gjcAp0+r5zFolb

s4lrVkPrd83JFp+GBTCHHEIVYFD8lPvNd7TputM0+/
ah8IwsiZWCTnDKJXdCXRkqwlkKLcJBIHxMEwBPRXgx6KXP8bPCLOFZifxavMAFgLBLMg7pbQAKa9cF1F

6CmCfTDb/r7DQBta6QY+gp8m1X7nTlwnNyRhYXtdVHPSCcZOj5G91UH1J/NMoevi4UwbcofttfWrI9LA

X9i0plMm1Guj7wok0wuV7SGM/ZSqNwk4pzToba8h0n
JMTBzwdmCt+ArmEUDulQX4T+MpMCJJAJP7uYeZRyZ/QD1bXuPVH6yAqycrRvFSFiXUX+QEsoCgELreWU

oHy8Zte/Ac9d17tDXCjdqQSej5U356IlXfyV92pyspO5Gr5KHCL+A/Pbj/rADSUU0Qef+OHOt1flMoR0

7Fd5o4oGRRL4sDmx+GcvkNUKcTWfjir7nSkj6MBwcG
aekrLl+4wY8dUCOe1Px3KAYuLdFAJRyVyvSMnl1S5L8p0Z/a6cB8Z6TNoYkI1ad2y8jkVdgUE0rw7YLO

Oz96KmC/v+J9vKM1gLXhuwJDNITUdMNMWVvNXejbrXOQXJvKlUiylk/matf38gBR3Z1w/p63hc4medjR

YWm3X2JYkUA2VNcrtGJgos3lok4mDK8WVMQ/5fehou
LRw0f4/whKou7nP6ROcU9//as7LUEp11hnS5qZk6b8776pG9DFnwwe6Itvd4ZILqDhkHIH6eMcBpePYJ

FyteqkkbXf8te2mlZoajDftYp4GGhvnys5F83Qz0Dr7Z6pujjsk6ey1kkEW5ohGuvU0Twafvhgd5Z4gU

LoeKE1prqJI6hphQDuHVLV7F8sTaDtyUVVc03z8FQk
NCMMC5n9yoo3c6t2xLOHnN6hPupr1rcFflhjcjvGtVmc1fTbl657d7aNVo7AUtpF7Mf54CWxjiqwlFoC

AVdtTpXXojuzOLc08JfTmTBtyPu0Tmuee8e8tsaPWZXde83zgxGdZo7+fLm7O+tiXb/v2oT9j2+6dOHC

wO04atE6H6+gwMXFZbXHypUeHiuW/90y5IUG8Jvc98
xe8/Yzx5EHZQm1uMFZ7WFVkszBn4bvae0MXRTWPpa2HMkcaCB8Qk+tvq/yC1G4VU/bjlgj8OjooolDwp

P83IyWNHgp9btFItY4cW6E9zJA7yIM7oxScfa9u77nr/kL+Lc9292mT/mpy/xUFre5aIj7pJDh5du9zf

sCtgdEHz+jg9RD170Mzlv1FLrLfavzmcQg3E3OjyjH
xbp+l1TNoj1/pSfkAZTsdMaK5hibq3jba6axj5nQtEfRlcockcJNl7rMyTSVEOJN/mWCmsE1YKMaOrFO

ACY39VC8W4h/X5Cf/+W5drZfmhOmQdvPE6gzMmpFMZbynq4mDwpi0u6KX/qxmbN9NNdtuErRfMzsKjYr

UXwIeu35oes8fpdjpFOUFshg/FqUvH+PrLzoYZct+q
dMpCSCDfuPwgdgpxCIcxiLi3JMwS28QNVZgq5EmHqvjQXHQdSizy1gq3DX+SZi8Q7uY8fgCVGOrOfQcC

fTd1CnO6v20aLEUrxk/RmBpKWwVAvIbiPIbJ73ak8G72sRYrrYj4T9LTS6Vk1Iv8+1pQY74N+KH8CP41

XIhjBtJWngcCRJ2yyeBbQPHhK5eulVbF3easNZ18nV
vyzPLPy/QN/J1quC42/I1sNecXrw3oGsFlCwqVKyqd7+bpK+te1DqIX1gVJOTIKRFg3ob4Hi1ZOOmsFs

tGisRlBEjXSLmL5+/Zaeynjn5/8t/AEO5F6W/0Dt3SEn+ggK8GxiBUOnGaL8PMwHD8WBvULmQJOLGFVc

Rb73HCNc+r6OIOQkgtNsLAeuKL3MoeqNs6Sp2/27bg
eQruC7Feuerz1Tt04TaTGqEYFf86Rr2f4tPcoJCrgz/Y8MY3oDd2zBsRQtMmSbzXAwXo7e5ulCuwi2Gf

v5iOVjwIlJjL6if1kmnE/GE9K74jxn6f8siXIogG1jjKgAxdGvoXKpMK7rTsRoDNuOn9s2Fwcj2rOSvs

3r+PGjJpjyqOhKTbbm32n6RATl42gsgx319VgNQnvE
c/h4ACP8NUl4a+lEDkr9nE/VldzUc186g/GuN3GB7JvoF1D/D0/Cc3nDoCJNs0UIgm/gEMgI/icBKh1n

nGN2Oj45qzSr4YHKECcMt6WT0m07WuQ7gCn91nTDz0Zw6IY9svY5FKrWWjOyOQ5iF5AZfXwDE7YSNXjA

5YtmElWmTdFsmRASWdcCTV9K7hvmvdYAMrCsfPBJk7
KfzmFNk2WrqQyMYVlGvZlicNTPuF9OBYmmjDe7Jruv86fM/OrhDTnR0LDHQZgQSBATnt3iW0XrBl22HM

Pc5i11ozCHsZo9+3b/vn0Md+CnHtYOwZjEF1eQOudCBI03Yp28XG6INkquYjtLjFy+6dKlUKjHsLAwtC

fQ6n6rCgDdoWqxSjn+fJwQl8ij6A5jz8LUhnRC1KEO
yZ1wxmAi2+8cA4fzvMWY5iLO4AQu94Snsx/saBSOGjVZFgCvXECYfRqr2KeJYUzpGlKDZ0sOdOofuEuV

ZycOzjoXVtuJcca9kYfZloT7T+ugX5rrbHp9nBOYyUl2Qoojxjmw9ZjDTMEku1oFvUTg7ZjG9f8QvBpB

/9143xqaQWI4gxu+aTq0SKn9I0OH+k/ynrWg1EikrO
l1go2tLOIjnDZGlknF072+6jPCZKaTDWRcMikm2HXCGrtAjdflUOfN0MM2uGb9jGgHIzbjc7zOW1IOPs

vCqTSV18CbhIv55LBrHPLeTk2vaTiYh0nq8eDNROH85EDtXC3KMYUkA0x2e+kB9cqq464ptmPtwuD4kC

OQrW+kVKGBr4o52DVBlOIPuRtR+hzHZhW81xZjj43s
vzfxFLToqd92EBDzP3xliL5rk56TxD7KxMUJb3dw8fjteOy+bm4djOQqjsS2zBrehAlTWPYepbqpNACO

hvzP0yFM988hWDYxq5jd/R6aGTwGE6zea0JtGlFdjBIk33iZi12Hi/aV4oYWc4dx7c5AoyMfBFcEGFdb

rnrp6FlvtDtZg0Jhe/MCx/mG+ziy5cyNiWgHtp1b0B
3BR4y1dhLHqacoy109NfCurCOcipEf2a3QWN7NEndCpw18vAAyNiGwRfRfn0j9Eh376VS06WVwa+fMef

nxa2VyCQUoi63Ku/P4HJjtNNxSJnuCGN4FkGnxrPL8qcYtKla3Hw1dRyGhrXrzFcT1TSf2VTNI3NLqj7

42x0s3OJ3bOvz+9QWJiYlSomLIzmPorsWYsAHUYWlu
Bj49wKVPrLrzZtLVZUL8rpHqPYKg3+63zdjIKGhnYN+3O4eOn1t1kB1CH/BTQfl2jx5zA9i1S4+b12/6

cvjdc3nbjMng3o3LQREOhF+imJsd7ipgjg8n65FUgkjKHPDQQIEwmepGfg5KvlSh/+uigLvbIow9Nha0

1NSw9fWRkRa4uIMRuaCyF9Kb1inPnI3UVyLeg8PP55
VqTHxc/ID+/UBVmCG2ByTIuQBpVswaf0B45cN2YkKXjFKjd/v3jx/MSLVoSVJWKfRbo2fJu9+xHrf7Hv

/RoyfAK+SlZYhElHI+gciQhZ9dldnlTLTy0armKDyRnlAMfoCnwZ8zQKtQdBw49aM/+bf6gT7WLR9WrQ

bCMscIHCu2msR6gnl4DXpr2A/EgZuKCQcxIeHPC5Ns
B9HsBsZexJ3gKA/OzsZWUNDtNJ/QoODVHiukJCSSEhmcRzl+/Hjos/cGr4ilospjSdJHSwMfFIffI9TG

Tamrz2J9TMkzSxQo2qv0MK1NXM72ipfycAvUUjcicv54Yh7YtXegX+GZhZjbuhcvdK7+Ni5aP43QWIYS

2nNox833NXmM1uanAP3Z+/aNxoNbraIT9iWiZjBNJM
mp/ftweqKpbeLu0Qu96vOw60/2XW34xlOWGBi38cYh6aMYFHlKIXSKt7g/p6emQtpGibsiPboEKxnTz8

OUpc62YVbkMTQyGdBI/ySg5uvVKRbuh9xmvY8AHqQPKyksqxWp4UjIwmsrkFWQNiJptZk/ra+n62yMO8

9P3+I5TDAIjjnmxraYkjEQMmyFqzOOHDPsgpnK5dCL
gYQ+A42WaRRfPPRt/+cJ8w3FkcME1AGLTxv48Oi5HKqRMgQYdBAICjh7VCeVWAx/0KH8Y93zdrcv8SOs

k9njRxzvHjKc3Rihh0a7YdgrAS/N3mFVrpDyqeXwB59FRgQ19hi1fYyvRa+nnj+vq21sYdzR2An93DMM

neWqU2qm4nQEMpewJV5Z8egaGnGd7uDyOGJByJQjt/
Srgv7RZaQ4pmwwUL/YnMEndWgUmcTRZtXjBJt8vl8CThdwPJrme79DA6puw393wjHsM+lHzlF16ceemW

8ynIM7nQMR5IV837RSUCWJBOtOdfXYdmxoiLEcAqwBAS1GIAFxmnaWpmmXZwI19Ze6PRyYgMv183DM7m

qmDHwfflcrVs6atnjHMJuYUoSFIqXd4k81/qIU6VLo
mwb8hYxE+qa69JOfu0rhZTIZbyhiq8xGyquGp+fLSVoicRMQzRbdVABwXUVB5vw9/wq6Jvo37YhB4+Ej

6un537b3j44+qB9DYHhZsasRBECU8c0CxyRD4tqUnyIFGgwjYn3yC7lYjbT5YAjtEw1081J16d79uCcU

PuFdiCohT9wdsft9uXm7t+bXkKBgWUlEjrIaZrUVHZ
1D8llYOhSymDZdWAD1pOhF6TiSkkH4jW2F+yuiIuACgDdiYGYefA98TWxk1IHRr63a0jBvexxeqMHoj1

PyLPVm0gCL1hfp5ycs2xyCEV3uorTOvr0tBUuspsDvp6FMaGVTSexQr5fRlwEIUmw/3/3MLz9WBGJsVC

xLJ3qC4WfSQRvwrIYkt4wPpFbO9CcYnZiW1UEQ2zuQ
n4YYTfEMfZ2Z1e6PGizInpQgjGuW2jn/7shMNaEIpcWYwGuyVkcWaNUNIkQ8376CNsWegwEpUzVDlYYD

NFFURs3I8EFEXFYSO7CjuBGMSAi2zDBCPocYZVfiIXArGzJ8ro1TOYBQSrRRJ57aLY9cqYUPSOudc3XS

Fu1rfWNYX8miKKAMiq67sRsC9WHRMq8oKeo5noZww6
zmAjKTbAEdb7y6sKrpPrS9IKjIS8OTg5Aj4dvKcAh1mzY8ZHCOZDz+ef581RmVMSO56/Mj2lOOtBFLWA

d36v9mDO4TSGzfn9FPpgzGSx3ey8gBIwtUcbfRaUl70x4whEYpfEELs1/HD5pSJplTEgctkcJsSZ93o0

2r3HtMalu5VpXN03m1qx0KT23iQ+SNh/fvw/3YmBif
jOenl1eodz2oHRgYTJUxHlde4bvZy4Lx3jVt67jNoKZjlpY+flWSkwvw9/9cVfXixYuysrID+/ebGBgR

bZv5+sytbxuQ9v9VKMidZJ49Rs7jGq16poMBnvHj/3gIt2p3bEZywkBa+mJ7RiJ2+obwhWYkjDGuoH03

v2tU48IXbzhYDv77SWdUtlb854Ew8JQkl0dPyqEu0r
SpU0F6/DayAJHj3XGSjXsFlh++puotp5Bb41HfNURnX686+L8MYPPhAsfc4LgF1aQtzLlGki5tt/3791

VTuPwuLtvh9t0zyYiJRgsUf6XsV+ML2nlGQIod2v+vGt4ogpI46VCiaF7Vmxq1AAoZTpIoXkOTJb4L23

+wX02Y8tYFJk4CblxYr84nfPqC17qAcXmztgD2bTTh
4oJ58/u9tP1b4+ZZLkILddV2yYwVvtLflR9z62+v/6eCa9briA/c1rAf5LhsPj1hQ8xpqbTZH5S0LYQP

XI/Qjb2XXxsFHU/2Y3FQW1/M8xIPEiqHl1fdMXq9+7D4Bdj5RCz2xWliMcxYRVlA305CJbDJOgjZCw32

cEdrPGuXkpzh99KYPd0LJsyvhyKFXstdp73Wx/vmLW
9Brx3loJm70DZFN2dy8szpjlV4Sa75D7ysf8yzFlhKAQcKkCCHMIbNkXJtb94ow7iX4Xbucx0NDrRwWy

kD+8vs4DnKPrer4hH7OGX9NsHaOKr9iZ5YhVifJ9141TDM6Znjd/WhQfqArYPTEwasZpUc16kJqINRrz

qpVwdfBo031mI4P083cWQ/iy6FRlWytdgNc4xnxfHd
QuKNC89GTdGGUaekitgIdBpQgsdnBDaEAidOZFSD5wk8jeTLNtJMlgnvKbGAHQa5MDU1uk3CbDsylYwQ

kP3L84ysUt5bzIh7pDqNHVf5AtSUq6DSEtTTq3nLCRuIYKWeSawz+lNJNLsLs5F0/+WuTNR5j4+DNNoR

IvcouHrdfNwGroKRZF5Xf9w4p/Fj9EAgXTBfUOzB1h
7OzniAyk+VIiIiRA/8eUXbnymqxUENqWMRBkut9BwB2wOJr7UwBVcEnfh7RpV4Ik/4+/ePhH1v30faEv

60kSY5Eelho/ygDcbc7mTRtQDbFkx80SNUbWbordKWYKXS3Aix4lUGh6uJzL3Xz8fdMVVY1YHtEk1XUZ

l0uxTEiJfSVOOlibw8OSiq978zvmZPqCflsOUAmHLH
k7VSp4kK5cjc0/MTbTVO9vET4n86+gX48WeQCsCyayLuVEfnzyyOL1wfIgIgdQvsn8fy1t9Qcs8DLiPN

Mofnytj1Z9XYkE/a9AMqXOWkiyhtEiovYDJbUuAiu51OH7JzPiXhNCRFcG3IouAb0UR+Zrd5yAOaWSQk

q+Upwv7vFzsm5nqEJsRxodNGknGqysLsEXHrzgAZ/t
fhOd7SHfv2w7rs9eTO0xEdnmNQaiZvrgea08KmsOuovUxw2o2E2LcQM67Qw5ffqFq23BFoCyBUJUcVKZ

q3RM5Co/jE7XZTL/g2E7yJayOT9PvAU0nQx8crriHfl74NFgUWUjU7nvEz6kU7P+MnAYo575lENVCFHb

diayIclCAz2tVY/E535NKBf9CQYCsK0xIN1NUJ7VCR
TGiavsSi7GZPK4Pjqs+joEofsf89I5yYx8UL9j8+6WZdLHd0fiYzp1/70A+Nmhtx89WuyjKCGJeh1lYx

Y35dJ85Xlk3vIVDfLasDcwcEoXW9DuAYenVlMOxluIWQ4Nop+/f2Im+2qoJRbKrkWh0OD27kK5tuHQg7

EL15/NiTuEOq2Z+tPDNTbRgplpZrtqaK5jlvWFjTTK
rh8u33cU8JBWeZ0FeDoA+sN5hnZl50G5qcBy8pMUdZl/N82X1JaYzm+3rVtoR117b1eqPBA6AemwZ1iM

NR0xijNhjHDRrsSL3lcUnI180F9k/JOQA10NA53kUg9RhHsfZ86QXJR7CFtn5KLO+Jq5Kq0ukuKzLGUB

JEwjqKnOowGp2wGqkOGbWi3ueqqbUSiuSxB616htVO
yyy4Ru3W3VP8w+5zW6591nbqcK+hjv9baq37vaJjCDQfnLQXe1zBMP7b/fxgS2RFdro06DH/f/4cdzou

X3evowX706FgbC9YBV5//wxzMYgTovisG41d3atO4kQLo8ukFXMS2ruqcG0413mZyQK6rUyNgbCfKx0w

erM7noGXBeEv6pTI6vo4j+xYLswPqWgG6F1mDY12uk
91i4vVoYeVGCWwB5Wk0d23gEOHEvriq+3eyTcw3zutQWP3h3k8l2RO2GqikQXSh1QjwUI7HHPb6kg6xR

TfqSBsUapj7cHvWbE/LVutOOv7PSIP3QPtg1BL2LKQxMsCR5cggqrdnPQRo1Z3I5w2L36M/oeGhiYnJ9

XeijNZtOmuImYd9hPp9TgVw0ggmcoWAPa39xDG/bn6
sOkePEYGcwFh9dqvm7wjy4AUEHHHlZIDzFwvtlmebqd24kAxe+jqgMlooNoGbkeSELpYjPkmYyr4RZpe

i4DNgJEpg7CtpzQqqZ8BoYb24GVhWind4qSZcT/LG2GOgdARdf+qp5KVykS/A2oHe1s0sgHc0+njJ4cO

SJh4bQ72yV29s/8eug/ePqFFQWb/N7mARAStf1oAcF
KBWwHEzw6ZYm2v3Kz1I+LG0D+STIFqzXSwK40SB9Sk/o3Zk/9/5Lwfraci7FotPOpdheuQUsySLCPVpr

5KGpbBNou5POv5exgNKQnA/yphczxqFBf2ltNiYdKI8Q145Nbu7sNhkJjKMV4EqvCqjvoPJgt/v0EvKs

mpmpKBcz314Iu1krV36GmPLeBwTsWJKcyl/OWgEQzO
od6ke+oWLS4v0QUXtILIkKlywhJRR/5CLJas0jdrPBBwvYxdPT9+/pgbOcumwQIL/6IbqokK4j3/fuTl

1jQLNy1/L9cP6rLw41gXY5IrdSneBsjCBiez/RyAzCcDARgTY3GFYI7gSGVxHfbCgDdwItWSMI2PftXx

NZ9w2MS8e2ycbDRP/chbu9kH3wqQqCUdpoxZ1uPCVC
/PvJqppKhU+YdCpdBaSb038cGAo+SDbsL2CuVUJrjgFH7u2Pt8yUYhL8sWdltE/Ey6fqGjHPLTVjYtlY

hFsSWPLvI8JWyywXH/K7bwB2CwwZb9Sk9tI6aGYJqptgCsp6SDAj5+5vVerVIGsphL9qKJSCn7u125bq

xV2KmgLCc1pSn2iu6XnlNJD/9h3Lh+/bfj4zNbYY7b
m9JSU+q333mAIYMLNMOEz79f8T39SYqeoQQICADRol/D/eSneFwnAaYyYYS9kiRlnNfovmKnHwaHHOMj

JOBrEndJEPNbWvWQLC1jw9DlMtQ6MZSxp9NjEue8GU9QWY3mzSeiHYfzAEKJMI7OnQs1VLGyN7NxPNWm

DASfe3PcUU7+WgA8ujEluOi2fN4kT3MgBLM+1684jz
YIDohd1oB6LGXljrBMqldDppFBcBFBmhIWJdHRnyPv42eOTPuQZQTf3frFm7F3zakB9lpRaWEiJzgC/r

dqH4fvj595ygpxIBLMH1yEfUmN2LSass/JX2TfuXlhBeaWcRyNoowTZQBeQHTGOals7esHmTMqtkku1A

zZIWT3MTWUEA9zJIRgDuq0G4wZc1dtV/Wz1c+q00vV
F1t6zle9UGq8VDj39e889Tpht9pgAPublthjCS6m9TwER6vL1Qrjmnu5+Vt/BMO0kL//WvQGmm49hjGA

qkIFzdI6ATTCoviDVpTV8tEZs37AV9QZeXYHeXLuc7be9W0X37LwS+mrADE0jIIfcqxUG4YiXFr7nkG2

Hr8ligDq1abbhnY0kpSviP9m9TWHN8j9Rnao8klMVk
rosHkcR11OzO1ROKrupNhM0SYhHd2nTovdXOcNFOBi0ge14okG2Jqk29YWsBYhzTN4pVs/oB4O9zXI9x

ssg71mO5Veg+RRzLrZuh7BI67twAwb9ahQjRKIrwiDxwmxXQYjTxfkL3LpawuF9xS1QCblwj4DibRxc0

aVpvURgrXBsWAg6hR10QJNvTwNmaCmt3M/plA9sjkY
KUC5QyqaqzVQ2YRFC9ZR7dRpt/n4u3Uu9jfgTcB4uJrYs5mDqJElJKPBDcis0X7uuJ8FWO7J/Gw7/snU

exZ90kf6UAloZRNhSdJ1d6gYM9Azda5/j3jEfymrK0E4GiZCEB53dRa2Mfbxxx/szx4bw+D7ixTS3UMd

IFi+gA3JWBfTIitfbSUMDnQSUYPpZo+jmcTYQjKFZR
ifl8TNcQ9DFGfPsZM6oop4xvQU2hItEQiFReg2PG0x7nexgeTGunrzc910Zb3e93oCqKZ2RtT4x5IAAC

JjBHFcBkJtnyIVVdW7uoAWuDMpG3Qbuu+W7kDWxgqkM8uWdNuZQCFSrz0HD22ILTaaeqwVOXYDohppLV

myg8CGXNxlk8QcGncFtR5cpn9AsydKzMnAD+fpFpN/
jUQNt1ZT2J97rLfTvhmvz9DYyYAiUPMq3rQ/wmSprq4Mep1MYGlHqyipVT3wIR0m8z/ciOZHVc3qunmT

Q1gQHUsJDfkGOvWDOnozZYHBe6Of0MPqOMGf2WDntyWDeuQBj5PgyqZOmpHGz620xhApcsiHOBUn/Wfg

+Bxg05jOP6nCPHltnD1mYPHS4ud9SGn+dYsVDCIQ8g
6oYlikhRfHq+fbdxqor7L/onvKtLS0rgMrpQuk9cdPGjsPJbNTNiAWtSckMy+I6RRr4vpDZIUSPYYNn6

sPnfxvDUcrnmNFvf6SUxch0o0dAjzDVeZJdSkOGSOK+hIEp2xsu4HHwhlfwHGkUuToGECsWsbX7VNeJ8

4xx+2i+VJDhg1I72Cn4wEMb3E01D7K86R1Esq72l7U
323t1E1UqqanE69V8WN1grfomE7oC21c5iaVmKj2XdT5a6KkoYLb6uzZT9NwvdDXTx8FmNRFagDxbuCU

4gpMzKdVCT+kKPDHmdMPLeU8Q1N+DD6CVQCSqppHJRBLxWjdeoySBnwKgD1qU/vvKdIYVLja2odazss4

QuYFfgZvPk+oBMSWqWT7k1gr9MwITaQxhgCxpzPpTt
J/QTfpNVusrnxGunhKe01g0TKTvMXl7A3/jWMsVpg/iZsZ4hJxY9TjHosw8V3uhqGwCwxXwrn4Sl4iGb

HEKWPTGEAFNB9SFwPBVnbJQp8ZA9ZBloWRiXogT/8bf6YqTj67nkFiAyDI/EeC4LJhbeifsui21+J5RT

GKefXIvSRhtxkqYO0O+yDz2oTowAC0A6z7lJ8ZwTeY
I6vDaUadPHtDNKdgxIplB3jrmjRgA7VPdwdrAVmkwnNg0FVxblzeYDN/1mpPDoCtoTfxsA9U1TMtqJaY

6JyR4OJFM6RcftJX0SyEYLGPJmDCkwv3BSXjcj/yzldlvZ1b2dtZWyPnH5uj250kFM1epxayb4u3rgfa

SE6Ak4jTov6F0vfJg9jKKwyodGyzA8iqHHcdW3g7hH
KecR8shT7b+3k8qR0ro3i68D5ZcZaok5F7WmwCj5DStUDGjeAvY4yT8kD67VzGox6xYrseGcLh9axM99

ROm8ihnB3ro+FrEX1+7FCI2gVVWntdff5h4yTqxPb7gs1011lzJxEXofnWhBRWxnGoUZ3LW5KJH5W3Ha

+xBJqtogmiGan4pYQnNTi9eqGQTZHIaqUoDpDmRl64
tvxYTGdIawjQGYHifW8WDyaNnp8sGn7ldgU0EwlZFSAmN1att3+1+kAeLfB9JMBwPYmc+LrIIQU1UyLm

e4mNpTK0YNouC09UHF5JlO5CXyXf2gDcUCIB/p91cDK3pfcp/9m6ABDT4YF8d3tm6rdQJ3FTy6SlYLrW

CX4+AYQy1je0Dx3ea26mD9QAbAdBlaDGdgf2u6JmLp
gS7q2fFLwb3dTObu/bPqVXRtxB89MXvgdRguGsRYwd38NPNOzq97ZC3A3QfwIc42GHAckpjQCnl5ab1Q

oSe4qX7YcKBd3Ri2VtJB0iSD4jp+IN/+ZEz1YpPDgcL0EUAtfrcGjY94EGwmoNwjPetcyjd2ob1XYZ0V

ej6t1DbsuTkzVyJbPP+hHFZBk1BpyGhm3mVKQl38xe
YpimNA4vdegYq+zQWOc9kCBRHY0nYe2ColYjwrJcroGlgo+L2PYyls9Nffz3Rsc6el+O6qXmAIftfLGs

N+53x4Pb/p8zYfiD6XOle0HrSebaGaCNC/DGSHT1LIDWvxjJZ8EpKRvewR6x0vtuEANAO540Zu8h3LLo

1eX8Dmy5ky5P78h2CtgP+scVQDjk7jIYU6uymiopIC
pQhjEUapM1czvx/pRLhszCxZDKaj6cqF7cdFkE/ocG2uUwHwbeordOrd6pda4O4LBmdbvvsVpgTG1cos

VV5XwGWD/CgA6Mf3+n680KXr5jhwh3IJbq0Eds0rLls3xVrzGjxqG2euEiS/E/eU+9cY7Dcg7BUskjLn

KTLbNbBNZ8HNrzi0kBpuOSrk3wCbpo2VZiUsgUEN1r
GAt85HfuHOapLnkm6+WtnTeE/mXRny2tUjJaMJO0ecXvsGbnepKnVtvAVUzfABQtTvoAPuWbZFtgueIj

AxfLEKTkKKDrJddPAVvSRJJpUyHob3RsH7IjQYHoXNPQPbEbE6L9mWNtJ6PyD6LSCVXlYEPinZDIt6ny

LbAwYKU0QXKmTzcdQZemMP5Ssa3oKx78KUspDSTzRa
HeCCd0El0KQVQjCtcxSQUKs2anSbTqEHC9UKQvFrahPLfzIC0KghwpPq90PSumUBZbUsZlCHs1Mt7DJO

DmWXMvVA75FXOuEFRGRmFzQ1JffhUgalRzZWS7IYEqIzg+BarwdyHjQauDMFdfAWWdUonYDBssB6yjtg

z5wFKzMEUxQHAmV7BjmNPjttCePrpgqIEAGZYzZLTq
Jm8Ur0WvPBKsZpub8Ylhf5yJCz3oL/CipwIPbRlehQb6LIDzWAarW1kCPb5JhayC7hBKyrKfJE51bxa+

jCVjkwqpfbDLTgyi+36e0/fhpD6duWG+3tEfHThsnLS+fe3G2ainwn0XgpkqVX00qBPvJsH5C5MnEHT8

3HcCIRyWW/tMvmEvfe650WfVmNxqqtZurJg0xYdHTZ
XXSmxKWhlTolyaJfm2oxY+h63m2Ry87CJeuiMm6PCqjI3seSapC9GF+0zjczaaPrjnYKdxYJIHowLuSk

vSvE7GApvxJ10pq5mE++8/2ejXVm/FrrpBzFMwSlitbo3EGPlpwLNMV67ZEx67Y/v3BxloRtBQJcvQA6

P6a4TYhKyzDfytbhleVblYF/uerezbOto+U/XNFB5J
W/2gIeaiM/wvu+ifn/nNG3bdHtdhuh2CcKOj1D0PR+7xMbsVxUt2hddmD3YrFFNMBXWfuz4NYmzXR967

OYLy7+9Bq6WWpNV2VD9LZcrbk+k74fQrh9/LQMsXc/eM5S/k7gg+iz50PRJxDlQdhWZgbS6T8eNf85xz

TnAYkUuCtUXtuekHfWTD41g/uFZRQX39nhe9Cd95Td
o+B/chPnx8PewjExL5lc0a5P1pUGEQC4FHXg/8wG+TaD08xchy5gnJ98ooz+l8mo685nch1zne2XPYWm

xvvhFiZI5b27kFQQjW/Gs47wc3t8Asb0J5x2V+m1BNqVT7WBijWQ44PcnTHW5g/zRvsz5q4H3g5VtiLW

sa4Zhh9ozgBCkindF5l/unbSpJh3H8jA11Zal+O6OI
6W72u004v61v5QH80p1Pdj56pm/sxAoUr30AZXKDk8PfchsvwrRsR9eIBifWaf8QVURMCCepUSTqm0as

lW4u9bHobTyVoONxw4uJ3lio0F0k97DCKUzW0X6vNPSqStoRj+MgXuzD8bCq5F/30ctiDg1egsy5x7Cy

ygkb9i8i7mLrup6qxXM8NEoOdYBImyIl0V1NKDd3eL
dTjSZgOVFQLBGkduAOrqqMf6E//BOntqCN8+af9H9acNH4cb6+ijxGevfh7z2lGEFhnCoGLtjRisK/cq

cs4qidYPme8LBVlQmXFF5PHhWcKGKREPxK4UGu7UuVmnB9e9NFZuMJwiOpoORTIp0muGkVxeey5Hz2Ty

Gd5vbjcBMLTHyXf7M34EJa4lakWlCbX8dDgRgyjFg9
rTe0IGFmwQLtVDqqlF1V0DvbhiDhOz9+GwXIpxz2Ma4Ci71gsOzvjgXmqhrcnEc4C8t0QUpDvEegzSxH

XqWYuIg07mpU7qLKMObEbnLwD8BOzFcEBTjlYVpBIyEiFUly3ZdM0J3dSHPI4Vn0qjcObujdqKJ64yTi

RzVQxgbvjluKxV5xUy5HACyDkqvH9LNhHUwPMs6xgb
KmRRdp6s8gtQ3w0w0JdNsk8jqbReJN0DAFiSipiAdwAjwP8oeJEi84FgoiuUJAwLF+BzvjAGCvh2Dlnr

GgPRsKl3ktavT4Gg9W4YH4058Ti9NGv9rIfD2ykqM2WkGw0ruyvI38v8CesToFncaFcca8TlbUDTjGIf

nNNtGCD89TmKSsj80vb2gyIuYv+Pc4xvUIIJaHlw66
aWMFZqJEJI2FXqVJduLSydbu9YnWJUGsuDkRPOKCwSFAgrcNONQ2SyX5rKfw/bYtCJjjpfYFQ9Lc3Smm

JsMKcnpFuUcVBdgn3c6ipimrL/tiIMRqpiKzNFeZX3rrFkWdqz3yfK2ZZPPSkAf4ew4GvE724Ain17J+

ciybIquWwv/eOhzBpuHBfbosEkU5JdjOnpymSPTQ3J
PqjvjMPlQgLuUJBjpugnQMaa60CSO9V36CHPQy5OLJ71B4O/Y+8Ytu3lZYKG+1NmKcvdpGTSE22EgUlJ

2BHMQJpJaCrOFf9n6P8pORN+HHrMEV11dfJmdJya5gG1x8gArbyZ8J6xlNSZAhncZNf/Rjpw2hMZ/sE+

6hO5P+bp13E7mikmMLFAw8cSokb6JuuiFY2DN+vNwu
op4QhxJkryLarfAGpR2Iod24fxFXxhWrBQQG8AwNiYY/h2EHQc2wS4kdj7H03I+5R7PMRpvdgLhOcYcC

V94+8U/2bR2xFGIYJfQhCXaeMkM8zLk9v3az75aA30mTrT8z3QteaeGasikn/2J0A7F/aZFe9c1Pg7nO

dELlWCqe2jvqFky6HNdGPLUHrIAI4Z4nPSE/Je2VYv
80MqFqWkb8qhY0fBB6OoO5osiQA6vdlYNDJU0P0BXSgjI3YsEzgsQLtzKJ/tGYgOlyI+0AAeANg5GIGd

8xfklE+tzG4DpF1A9bAY1yoTBMqF9jNED6BnYdm8HurQWX9VTxynbBj73mAZNM8xF/EOd2iOa4szKBe3

Hgp1HA+QVsA30IdgfOq70UYSjTZN3iTjGIGAnMe2QJ
G377R6iQ/f6esWKltF0A9wboWZrKOe5b5bxSSyCcda56S7cSwlJmaKrmfeLfLgzo8pe2ulHpvBUFIiuC

OaQCwI3KA5QJczV4jiFctN4J0xpObF0ep+XyvNO4kDW+zQWXbB48kgY/D/3AtMJonRZbrord7mGW8NR5

xpfG0Xw5MH3gSSPQPR2FMx4X+YA74P2FuMnZhN6T8p
+nfxeq9fMoQMCAciB6YudvmJNq6Q/e/BPAv6/kUf6ouAPLvlu1Sg901Eis/B4P/sbjjvtD0/ETjytphV

hox93lZ45/7LumzAHbD5OBmbRk5u9XR2QPg7jSpkEIA1/AG4np5YXkOvFwVRJD7SGyst9K7NlPbLxoqO

gM2Y0VpH7l5B+vak+y/Q+xVoeTRwYnMMN4fhOyaDzf
isReTzvRNhXfUUKtTlpGEyU1PNsgzwJqYpeDPAcjUHJzBmuWJHdWTVIkJoZyg2OvF9VoZTSfZERIGnUd

P8R2mATxU4DzG0HFCSFsANPtfBEKn5gmQrEbFSP9IBLcQhqfPFvhQU8Fdj3dIm38XIygOHPgNkGaOTj9

Vo8WGYQwVbuyMUZYz6dbMuXnNJJ4TYEiOfttXInaAD
4SkibhHs44ZIybWQQmRnFaUPo0Eo2UCGFcUGDpIK46WYRcMQTKNtYmG5QognNuwzTlGNQ1QTIlWcq+Pg

pijuWfWiwTErNvZHDwDpiERZuaQ9bqvbl4kATzNpIcRBRbS1IiyRYzlvVgRxsjkQRYMMPhICCfKt3Wn8

QeYBAsAvtf7Kjms+YJNy3qW4wKSqzHCsv58ecSbG9U
ksVvnLuDf4c7KdALjRDggxv1g6bcCWTTPTbpZ7j5MJgu55GaF20O+lojCMO/C/hlDOPSq6sWlTqKGTcO

E7JFp5rv/+cndq3JIHIttPbjWQDGQiUxMZxHUkR4MkIw4YiEEQY0o0J6kxmcUYjFfMcqBEmkFLJG1IK6

b5MGG531dd1wt1399QhAUr9CnVvE7cH5piAnyrSc0d
8V/B2NP+/Lq7ACTeKYl7GER2KsURqf9Cb5/pVhmUevXpf4YeK+pdYa1/xRuUYLdH1442VRllcLcHX48R

0j5AHBq4tOS0U19SmzKpDQoYNf5oFPYxkNJkpV2bJCsedLtJXIePCpz4i7DpnqngHcwio0EotaJ/wf6r

E6tpZoi+ppMbe8o+fMc0ln0jCKqnVx3A9VHAMkivK2
1FLFRiHDyyHZClIdFhzDhm4WA8l3Sp7uPEOa3++OZLN/TGEkaqo5JQ2ISvPHZHV4MyAD4J5FO0F6ef8a

tMJ5X1kUk4fQJPh3JdDAZyitN5KTdw7SV1UGKRGZ+RFKG6YKIk8WdcRNWFE0Zzr0MNcBmmrnYGdarXOf

P6FBEn+bRmj9EgF0sQH23ohT4fm021TO3Eeq+PWu1W
n3WmjgXzSOdMxqmVWJSMASQwawmfUi44zYLtiPWZYeo+gdR4rW7r/E2UUiHfTLWFluscQ4pM2AV3NFJ5

Xf/wSyCRB+p8g75ludWPAipGDpgdxDTS/ihXEkGQ34sT/49mu0+LkEF2Qyq1Q/cZwpaWTW3I5zb8q3Oh

GL+dMKpe1ZRCfCf/UuQPvJ5bvzHRrfPCf01KUUezUO
5u8a3rVscLlKHG6a+MhvN78LWNEvldKK4nBsK3WrfUe9K9Rnwghb1jIuwerw9ar8imfzsHwmgDxutg2I

7xlFdZ2xnR+IAQAuPJ9RiaXqh+4UIgutbXA+LdZ1+JKMnHsSKLKWMpPICchFc3HFoDTxp+EuurVlDm4y

BD/PjZktt/2pAVcCf7DMhz+LipP2EQtMbM5awsbWgm
K1dp4dTWF4pgOAAG6ZDuj4MB0g1SOcYs2yRkhfsee+o8C15t+hOHjdysPdDE7Yq7GGdBwva9NyxrXUJe

mRv23BACbaQeqgU2mNPnYvPADdAAW7C8Vw33r0EYAhOg20k/n3DLx7q991DPdlOqSZMI/xcxzlMSbaI6

KD52ZIvfG3OuU/PGRf2iX369poDHLDbbLS5J88LcPY
l+vs147ITzsGTe+yjL/V882MDu7+E3VHnalxDty6qLhscgvg2B316x5/4V98SkjHzKiFIhwydunlEZCr

pGQ5BsPBH6EmHfOlXJT10wKmJFVxPXTyDsZujjk+AjbMy5ZVgbYv+XKYwVBtLI6A3U0F0hYpY5Cb3kon

5C9t1sF2bAv4LwHd/q0npTuUayjToj2t4yWMgSW9TF
gmTXWO9OaDWbW7682FegnhZH16n8vBKtiVOe59l65nYAT/qBfuNhMC1Ov1bVJSRHNZ6FN9bEvR26CbRl

EklbJBXvaojio9dqX2dl12aIfMcca7cP9CXd3KI2B4zLU73U/tSA5iIWLXuSFnsxqsqcrfq5LCeGwxEm

icMbGs9ZrOhp3R23ZZ+veAPWgM8IXAYE34BGBWROOx
AUFu9KS/fIt6/mY2TdUF2JKP+rEXZz7zBBjNArEax7mUOHzjjKRd1xEyDLiIgZBBI5u9h7wt0cIaFBHK

r0ML3o5caO8zfbz8u2jcpKCZpvR5Pwrzl4VBEuaqFdoh4jMSZblinrIWhrZXm8YD72mn/xd3bPXoixCl

JtIPxHwJXFVXCjJQmCUNLYxOeivzBW57ahku1VceSw
YCcNVemgtNoSzDf8Qix7pZrvBNjObnrBiVJgW+v+ZxTXFnnhL4Vwgp9IFfgyIkQlIGYZjQK255iTlw59

lZLsFqXmQsub8sGbKdk9JL6DazZQTK5noJrQwlGVaIq9kW28jBZQX1NnPNho5nTqiNuJUHQaEp11kO2D

NYnHrajxKK0M9L/AJ+kU58ZAHzBuFmTEEdRVt2hj8H
2exzaZ58L1VIHBFaDM1buyU4myC3VgGB2QAVhSsLcyhRDo0+DAjmYlY2il416uKbtHtC543ktsB0DHJi

Sd78j09MMo1m+Ff3qNu/zxfMXAWncpZGPlfxN18R8d4UGpTQ+z5GDYBh31baBXmOqpbJD7eY4IL419e7

p5zrytxL+kOKAPDQaLdxjcr5G9VirNE15aXTTtHtXQ
BIl2s6Cf2kYR0CZjS2hpxa3MsnCmmcLhG7h/osIKe5r79gd0d5aIejyEFnEwqIRJ7z6f4mOUPBcQUcCf

TKxdQ+rvpcbwNz9RFUe8sjz9A4KJaG/g55OZiZEn0BjhL50rmJvl7WqfC3W5zuNHoA7BvvvyGG3lkLC0

JimvGPewcTnOsZvzkP2rakiI0S2S6Q9IJ52r3nFeOF
sO6XgRBJNl3tthqBCEUnGmmwpjlHH7T/jJgmShqIVXFkBpilSMAUQk5xRrbefjGGq3cVAWgMQ1I+It8B

sV+pcqjj94lFF0EIiGQRU4akhScjRH0tynhll8IHbeV54ot/LHZq4CfuAcqgxzBaSJ1E9bQJRG6MaWsc

GPr1YjCwWkqmcse8sM8Opk9ObfOfs2SuuvHCNCl8Qi
O6nsYSjaQINsg77bqXObhREsbzZzrvWNMqpANNhM9TIYErTbfAPoOBVnuwEbKChyRLs/46spbnjZuSQ3

ZbfwlMp29b38Itwzit8RNkdejRXJUertmQSbbmeWAZtGj9kYPLKdjRv8H2d9bnsfNBNBkGd13/B3il29

6E18g/hRdjaVeb+1c0xoayCyHbJlO7w6ZUgV/vwIXz
xPfb7hXQrIgRChqrfpcnZIVyeAy1VPBXBZAZ6AkTut7M+3hqgW3ZjyJvBq2xCK7eoG+kiwN41giw22Xa

l6MZTCmWKkGWff607A5xGLEhjXHp6RUWKGmGeMtcvFPHj09nndfJWzAtsQo1AZxf/t41SGyqKh8bLJMN

N1lmDPefYWWcAjCSVp+3LIpdVStaPeEU9MEtMLsG+w
YheQlux/cNrd/3jnkN0fGq4IJyXaJTqrIPZUm4wSHko3IeSs7TIXNXmWKzA1WS4Nnor7XCtzhShoY8Cu

9vfkD+j/19Nm0Yr7KJvD39uCx9j8TP/Bi+PHdD4YQLTwQImInB8/l04KiS90AZIoIYfylFyayr/CtvYu

49JmabmDgZMWrkVtTKDdUFTzO7ppS7tM4wYy/WCpIS
EsFISR08PXsFoeC9kJ3tbCp+8UWwFN4AS03sltn/3qerPydtbzWuaYUzRC9OZG0iz6BgOxDqPLPdu6Ro

JqC2BNFGVL4aj3TfOeWnITCsi5EnBtp0EkVkF3Xqj027gxKtrsWiJQQnCqscPU0EqPTmCW5FDNdiNyMr

F06oYDgcFc56RHeeWNHvPsRqHTc4Ng4JREXgJ5HwnV
KtjPFwQRJwTGMuZhC6RROoShPsV1LcMMLwCy53VCeuULSjZrVcADs0Ox5ZRPAtFRQlyAVecGIkPEFpUG

MuGiQ6WQPpTxFyB6GtopExqYYfFOTzNpvnBH3Zd388ZS09tiBvJiFpWRLCIz4YCM4ls0YrBiO5IVTlx4

CfPtn0XV7QLY6pyAawJeWmEZIJWA4XmUj4EFDaA7Yc
SBYfSDNrw3RgKQ2+ZfX3zdIvxPq7tBRsw4DiMJu2wj/zwy8i5SDxfl8I+jiwGFjUamB5spCXiIWRwy2s

johYes511iCQJYorH4icY4nq2GA0kkf7cL1ri+FbjSAMPx/6A2cWLAgSrw8MPFDou1cLoGzXuar1ibU2

O8OTy5IXnJsA7bacYQSmRvAKTM2llKNPzrSiaj648F
bmVXGybF6TmAFWDRZovGs2Pk40ZyEzIoiy749ZS283qhe4SHImmtPXWcqLxxgpJ3f1/ujC+9b9mZgUTh

ZHYUkcFqUcRiiGRbtoOEO//c3vo+2qHe53ybI1jnHJc5enEqhyxva1t4GQTlmH1nZyzs3V59EYH4Vw0o

dlvYFd1TIB0iut4wKRtVpiFISSTEj8ojQrNkFfLi6W
dzD0+qO2vng5a+ivVozO0zG088dBzne8/dFOUvv2384dE++DER9JXXqV4Z359MNDPtukAYqexrPHlMwP

wiVvzP2Wdpy5K4+WUd2Jke2EpT9ceHcRy24NuTVyAi3YessxzkbOYBPRa/GB7X8OOaHpkoTKgxdn2iQq

lvh1fqcAU9YnhGPKJlFdd49HehplidVsQRHfQjXVAY
VgdKNfnWSQDwH7zVJ67PXHuVe5FeylvpxWFGAU87MhEmhzQrjCjA2rkAnf1J93Pxkr07tz85UoodT53R

D/0YleMS2m9bXkUApNADCo8s0iU2U4UX+ijqNdsb9Ii4x8/k0RppeXp+AYLgfHcQSOc5+GhFB3W6Jgal

exMQj+E6jzcBVBwxMYHN8TyJ2mjWsFyvSNt4DUjCCW
6xVL2IAEd/UH+61N/E0/S+dlwU/lH/x+z971CfIJROOIjt+cTkDv/N4ni7FZ5VX8Ene/9AuX68s1EShf

8z9C+GIly8QZBSY/LmSfhQvkAuAZepO5aikkhIDHDD/bG/z0zRuPBA0uaclRUE8ETHiA/GIerGAhRWqp

51sTcaj8y+SnwLIarBoNDIoSebtzfEBKEtso82cdVh
QvyBZq11PfmgajzVxgSRzqO13gLJXj1KJOn4DmESmwtzdK+sh88qcS4tSmr+yM9sA8AWqcLvuo8p/AeY

yO8bsEJXkaJBrG3IVHsMvPyo2yVJy4YH77wToyAcaOW7d3PvqCDF1voB4UIWGJZ5g77TdEMGhbfZDJCG

Iq7o09nPg3Zs//9RL5z6RQkFrIx9XibYui18wioTqA
uzX+THvc9DVFgvPA5B02gALAlKPS/CDwXSTnu9Sjp78H2fGdR+yrNSLAwx9CNroCAn26/VGzOYv/oveN

vCpgce4hRpBdTj/t9RkK1BX14fWmRMyve2WUulBxZT1bZOz+dfSK3mnMBtpcFx2lOBJ/DCD8bQ+nvLIX

alP9kOIuis/j7hTLyZzgYGLF5qeTotoDhlmSX/ESBc
oAW4BktV9kkk7mHh6x3oaeWxV0i8z/7Q/g6OPDf2XMvhftE/To/5TSaWxO6wGXRh1qwY2A/9MV9yabYH

zLYxcAiUhpd1BpTMGdn5QuZnFuQKD3xCRvoznF4owjCHqDoS03LQoz7Pv4tFpKx0US/M955kqcjj+1h/

2A1NEjvmSi2yWHZin6YOrYpvehC8f69Ch4qayFsmFP
6F9WrSPTOygNoUSgyMBABAr1M+oa8Xr/sslmJ0CqLVNPWU0kd7VQBYW3HM1t+xXswhWabkYTSADBchkt

lxCVtngjbArbsgtXhh6fcNKWgbEEKEqKJaCU75okcmi5Wy3VXgNra36/1fy5nJNU+tzwEt6zeEyarnim

VcTlHaSntRTEQwmFzXehZxph019j5k35Uwt+Qh3i0j
01/ROGy3fqorwrD+jMAI24GT0WrSKI9XTG8F+PuWneJIA1YQaYxJ1Y74u20LuEpY77ekfU9GIykmLHHa

nlRXKKU1U64895fZhOsSFWaZGijuqyMDWUdGHmGJfkOp2kDPQlN+2kqhWIu0RWxPmW6bUgHetFmAgV3+

wws4cDxG5dXW3gOpHZK2qXSR5y2JzXQdRVCyXwAhnL
BOGbEnWeLHjFAcf08SIIPi6QvtyQobEhhVVg1+g8lmxejE1NqDdO9KiJvL2LAoU6eGTdvlvBLZoJgVlx

WhqRbOU9y5s7WOAxKPMj33LfOVL4thj5WEKVuMIgjsI5uVG1hXdfSqKoi9DmBYB1kSHlQK1TPATn6uxv

+chelXULElFuFYX+kNZ0VitUaZ18rxnRfH6ukUmyqV
SmOva7wp5KLgb4RxOG3w7Vv8D6ppKBryb3vMOxb2IfUCWAXS2AVWQb35r1vVTT9HVt01XWD6MY4yxVFF

rUSokSESlxyqRGKUSmLJguTKk1QYIQ+X1q9qfOoTGNz4dhgciYQAL8+WKAohmsJi+htAUJq5Rynzq+uG

VDT/HNtfOm8TaUcdgOMlYIRk7XG34g0GT4kxLDtNwK
VdoWN5lrvkp3pgNuyxHIzV10cEGV6icZP+llkqQ/YQT2b7/56tBP3xZv2rOBQD0FTqZOx1mECCt2n2SF

59FM/uKCx+djsPQVedOl8IRkNtLxM2w+1PlpdfqD40hGcCBcqRgqNaMQGpgobQQFbOWiz3uPNfhDk9GB

7eg6upjdcgjU8dXFkJmUI4uWo+2sS/UlBi767cRon0
mQLuVXOAUKiIxcYXRs4WnPbXuU0hpOtOakPV751eZZW0DN+XzetF2FfncXp3NqZuX9eoz/6qIHne386w

+J4kzR0L1RSCw5IwvNz+1kvQabVTNBTazYeNUeI3L7D2idl6i7PWBwv06LDUruWyZJ4D1tE8n3WYrOJG

KSM/EfmsMlsXMLrnmhsE50UWxgFoZWmEWOSEVHSJ8K
H2icXw2AgNZn3gOFA7EVS49pX7LZWl5dKuzFbzFDrCLAwgMmWFzVxNFHcCsWz3JLF3OmWLZuJhU5Wq6/

JUmxhgYYK3pwReHI6GH6RyfPVgQ5uzbtrwPeRyXSzgRvvSmWUIdc0Ha7BYnV6t7AyVvfMpEKJWq5LJ43

1cWy5XvAFBbSVAuBirPb7vHtUMzPvZzLQ5CmmoSKeB
kWXQF+e8AE/l6Nyr4kHCyruCzVWi2NdPwjsMg3b4kv5avcb2RpTT7QgY7QOMUyL/MiGpl/9rQMLml3XB

20ierEqT/p+A+EVC1H27ea+didpiji0iwpqYfBh6njDQhtZKtje3VZd3xIpABYAFYXUXo3g3aaIASPsT

yYjbODD4J3uPgXDcihHQZEU6xHIfYrtwNwgRDFtVKS
Tz0qNbPjWnxwn0eJ2HKtUtiJU3WYjS0ClWW/5qHTZCrj3vfsqj4NXmFYWxX3q0rK3j1InpZTCH/eHu5d

XExwAo7speutA43LWk3x+9kHz/wqyToobA9iB2+U8EBnV7BHDqtQuuWnbyTcAUCoYWQ0NNVLFZr2m1TX

D5/XFfFVkZsBIkMTACiOPxBX82Oxf/yQ2ip0gs8TJo
2Abset/IBBfc6mgQqc03dzm7khiMCXrIAo0GLx9AvkNc+VtrGP1I2kMIULjk/Aim/VgQMRj5ppEBsUwI

OlmHXkNS2SjHTXM4LhY68REnHd1Cfld3vFplbJDS3PHGq1IAO54B8aJdgVoAJ6SlGO1LO5L2fJqAeH/f

dLE3T/I80HFuWMpGhgrP6ARq2ujSphVa7Kpty4n4qE
ZqPlB6Np2OmFwk9c4iOSuczbtHnnzWaU05sFgwctqCrofds5VTbjq0F/RV4gf8CWKM0bm4FqEXWkEbTa

YK8huuydFeHgZQ4zrbksTgA6LnAoZY4qyqdnTDSmPE4gfas6HVbgJX1BBVFwgOUhGLOfJxOsYJOXZGZf

NFmtANOqHSYyTMjmOU8IPVLyYVZgpKAnULSpRBGfSw
G8UIBfTsTaG5Vpd6JZo5ieIgMfKSE4FRPfHastDIvmVL8ApOClJRGrzXHpSVQpIFUeTgH2JFZsSjDhC8

GoeO8Qm9cpFhNlLPY4DSAzZtdbJBknKA5OHDVxnfEjKIFwPJIMDMOxY12ywOBvaSVuBzKzVFQZMi5+Cm

BjSH1masueDPKlSH1flej2LJPqGVFmN8EpJIL7GSUw
GjEpSmbkjZZbTD7KfNS4LRWaO51bTZR+PfhpqSIgBF0IeFyQB6udZkxRspeU8WVlX8f+V1JABhtjbUpw

FQDh9r9Jq4YszLBxjDNukY/zNe2fwAi1+geckFCjKl0p2goMNfECswPekTkVAg/wh/IQTU4z7/ZtnAhH

pTKEcyql2/3qWwN/dqVksvrwCSIDGjIRX8O4G1+T5V
ZGGE+7EBuVjTjnqTFCgXDWldQoamnUjEhSL2Et2MuV7rm0No9/iRJzvuei0WoCx5Y0hGn239T+0riaNJ

dDLh6TYg/b2qB2yGJhoXKQjuccYIqwzvxW/ONJ0qlJErrQI/1gw52m2WgaHSmhFejkPLVmgq87rMFClE

igpwauSq14GGsKOZ61A3jiEibOeKDRWFz5H5E9o2oe
C35D7ATi+7k97xinuZmyfggv9qHgdctDoN2f4Te59/Y32pSwtwkm5B+4mU8udCdWqpYqIlD/3mEHrKFK

F6KVmA58MFUo++PamG3lL5ijw3VO9UIAKnq2oRYzuJy1AKtEAAC6K0ecy57V5+Ji3dm4AKCOWIyHe3hV

KoUT5/g7t15B8m9sKXsgtBZ6Fvl1mLF3Pz9uslc3T8
VtKDZK3HDOQUukBQmtnX+lrUbkGVRpD2d3esIaxMn5cD+/oVQ51hg9Sq38fLeMBbbw2vQGuag/6YBL+q

XV/fSv8soDGZPtaUixlBQ1aopOiSClOToM3OycOssCkPS/LLLyjLycdIePbPvvM/y2oBkATTkBgfE06/

o+j5Mst7cEG5+C2BQEf+JzxB2GKy3jLRy2o1G2eg56
eQorIXDueNpZFG2dYKb630vguOMvzvUbEyiueuCEccRZg7ZJG8O5DHtZg48sfgGEYQmCc2v8yY/1Sad3

lL0OyEFK25eEbFyJl7vRKBN5wPpDh0KpvPpEzrm9Z/zOubxN7clDOCnUqZertPYjjN1yfWFiwiNqlBjm

BM3wVLgl9UgdaHL7WwgnyGvS1jFVxIlPS2ZkVxSFFH
Ts6sMPsYryqbyDyRUHeYPQSWHJSUyFJ3CwPQEldIQMU8nF9xhNUKh0DSW6DFrcaSol+HMZhYIZ8XcNnQ

NH3d7ndiU3UYznfCf7DGhwUUF5xos038d2JD4qsVJqLsobuoxeD7OoRIRDyXctsTxiTLxC5RiMFmgp4O

iVnv24Gsu9xLWAUAMhgwdmYL7YwqcQGeLSgwldNxDy
Xf6v1apfV9oEWNPhOyasdzowJAHuSUyBDT4J0HYQsLlE2D7iN5Zwr1jLobpRBqOKJHXYjAbSwlcWIdtm

CXyyOkyC9nRGIQpBzD0y3C+ds25c2aDSMmBFys8MwGT07A7L5ljTHE9DqUkkYeGVCKwFHVClqQWdTjDW

uWZluGLCWDkW9uGTg0DZraj4RFKRUhOpcuIuzi2Y63
3/ql5In13zKFiTHMmVeLX0r4MhkAdTtikwfWBdabELkgvOSUO7Bj9COYSlGL9yqXUKcxTZGojt4Er2nq

aVrGF1AnYD7B3S+VHaVPtYi/dxT6v9o1PZBLtTL8FAWKEyvn1ud+EcvDeb7g3pvpw2ktdhQbTON31ocS

IIBkSYE4Bh74y590uxprAUnOvTd6HJmwKGLTC9K4Pi
IMqpUIFzHswfMiJvyDMlBeAhbpiQFeYBBI+EB4Mt80HuuD6xC0FFmAqsLcroQaGhBwSz/Fy/FjnoihkH

VE3lTBy20mYfNpzh9fHiyRpyUOo+nE9LZ0jb5edOmOee5+Y2TlRm7bctTGvunYG0MHXsumIGGiZCpJtJ

ZUjin1t5z1bdLzdtY1/ZPcorWro76AfQcfcX5HbnGL
/KDZw6lBHA5m6d8EkyL1W5NC4kMsTZCr4mZk5JvNzjkoquYiSFKm7OoyoPExyzzECp8kiofY5bYGYFlA

uxtIS6+2h7atgkgly+psh0P6N8sLInMvgjlWxv8XRd6tRBHZOaGuUI5FlsVr2kQ5rKA57MqczgQYcese

173u2z983HQwhsibcTNal2srcgBVdOpA9iGYabS0cJ
sMdd0qk6TjoZYRnuCLauF4d6jagrX7F4EPcuEanXnis8A1IF4BkkcEOdP9V9JEiG7EQMYF+ww80wEJqF

JtfAiacPOM7dysp3FSQCkmuvEdXznfSgwVrTpbSStklFg/7V/N9rwOmnmP2Cm5hYxexcarPsBQAFm1Rc

1h0BmqYPs4wvcpOnjj436pxlOQcOQR4ULE2PinGk9r
6Zv0/gQ1i975ghqPIwfHQEllXpLXgJLqXhl+Uy9FPbmDm6j1jtmwngDvnSjZqwJbSGm1qxvDw43Wk2m5

kVOvorDSJAZJH9l8fpLZIbTrt1ZwWOnu6FdhiHJemmlwzSNF0aUoaBHpGbQ8qF6s1oY9ph+sKqTKvfLr

+k1p7MHAF612zYOeWujZiMsp60/g2KVZKi82DI3FHK
KBzDPPHF6HtEC99IAl1AhsSYscrVVXX4DXeVD8eT1nOcTVN77MPl5wKL7/gYsyGutQruXULUOivrY5lT

VeoRZAsPrGME8IcWHWcP7ToVRqcaPm+zPZ0NkjxLJE6p/Jzn9BpJzO9whZwxaTR6dsP/zU4DEGxgCUae

cRDBpxwt29NA8uZlCQqJ4oBJKUZSUkvwQ1mJU6q4TF
tKs7ajhZk86fNN3jkhMot14T9xBh9SzaOoJvFGslp/r1Rq2wgDfWiPxh3JCiv0Tgrdtn1uC474s4T7Et

xrq/0cKr1g2+Lmk8TgjdVSK6t2OC2FAcr4MZBF+7INVrmaEqMOczMDHbVFgPemeXzLAXwnSHt87DPeS5

JgAEsPz6kXMzgdy6s71tTEl85AHdX9MjutVWPfQm4j
vGQCcZW1qmUFrkIfcCD6vdCuONHLKnIunh7URw4Upnz3PeIXdIn6Tta0bjjeDzOd+qDHmte8d1qOUEAg

nY1tcbobYoIepiS49EebrHF3r2S85qWt2rYwjas6jTTiPbWJArcqKknDfq61DLFy1IKJlifZklLNmvpE

zBbMK2CLCwjRriXh2pjkSZKZlI3TXpteXk1eq/zHpt
rdtby9LHXBZkCh6UcP9dVJK6/XIYKi/3RapfheQ2GEuVDD6Mp5BXi328P4WTepQwI7rDy3eb4Iubeooi

E34flHHQqHzSwcPEADX3qWyqQulbnF2l+qCco3aeg/eoy8VsvbF+18ch62fL5HRxcxTe9emQ9CJvInVf

fCS2zLMBSAAnW3ew0BK5EXwCWOQHfJc2Sv4rnKjuhl
sSElaZkomUb0t5vHezEk/kh+Y6GuJjsDY7wtm8K2XWwAcaE2fDbPT/K0enlZ/Ln9BFw8v/frjt7/OV9+

+YhVmybEHOVDyoGA8trKx/PNZq+yN68mmeFIr4H23fSzCaHhlnnRQ5ycaTDySfTt5yt3pd+qAWmmcuoq

ZBV6KDmJ/WjcDanFCjePnvdzN/hP0eZZA7ixvWomLj
OQJLbwg02pYbU7racgbM5TC7FfD0fA11krmOu5xGtcU8sN3b5Qt+M26aYbsQEf51OBlcO3frm1N1Mond

Ggp6J4I8fM3IPs6wbkWZ6TJFd07gcgbfwEk5T092qWjnZ8KBZxrlgU+D0JxMfcrL1l8g/UAwIVdnA1S5

BNehGU6/51VXrJlJPHg/ub5nC9GRzlmQi557rwRktT
9liurWJjV9bpvr8Q7gJ+aGdb6kYlalB0/F8T7Js1pfIvZ7qh4usAnxzu+TaJt8Kpg3OhZ/tYry54RxTh

pm5J5UywCE+3kqY0b0rdHmSxU4ZWf4Zcw2Go5gfzXqG+v9UoSxw/ahssusu79eYqJvy8CqMcDxDjSU1r

k7CAh29CxuSo3KTZt23dxMd9ZZIe2N/uUzYv9mIiV4
/LBSIN8C6kVNh5APwOzSxlbZ0+5I+rjvNSrAf8n3pgPKCo8LH8HO3Jp7u0kjeCGsJlhf/mQ/L6ZMM/Nf

9Ov6spWnpmlfJdoGSaUM5CCH2cy7KlZgB5VVIjx5UiQyX2PLCMOT3zw5NoOzS3LPIpb6FgXub9UnAdQ2

Epn062enYvucBjAZCiCzrsCX9UvBHkXB4LRHuhIpJr
M85dEWkvLz35KOgzLRVzKoXzZQq4Qe9GZEUaV8XbsNFrgFTfWTImATXfUfP7IBLsAfCfY4IbAYCfHv34

LOkrDEGtRpSrLAl0Ip9RUHZkOPSmgTTsdSBsUKYcGURdUcL5PVAiGtDxM1UzvkHajNYiEKPnUzjnYU4R

h566AC52vqIcAUHeELQAOe6YPT7rj8JjVsIwVITpj3
QzFlb8QxUjS1D7lQGhA4SabyNSJLCtW5LeiIkoAPFfZFnqCUIHVLLlVvNaVQEzahSqV8IezXWtQWSyeZ

ZPXJFbBY5qm6WeobqnN1gtegMbu9qWvbIbDDjfTgs+PgplbmRvYmoKMzEgMCBvYmoKPDwKICAvVHlwZS

QtMb7rmVmeJR8ZqKN1uCTyGV1HmGUjUOdaFA8AMOFd
Kj7xdUSnTVErgaO9vXBgFEGnvVGQKHEiZK6yw5YpxarqV2khexXfx4gEivImHPkfOpo+PgplbmRvYmoK

JuRlFYOtUehFZTuWQAAhZOvjZWZuBd6gqNjqLV8QpNJ3wYXrOV3FsYUvBZhzMQ0HTWPzTb1ddTYcSBub

ZGJzOb2kEB7TLTGjPS1il5UwchwwX4uzjuRyw8gQgw
NvZGluZwo+ZbkmfgZrKrjOCJTuGE3hiez6GLAlXClsHAUbXCIxGFFLV4MmeD29MQEHK2qdUCVcLhA3TV

TeVhNqPCIyYNCuWiSaXCYNNPN5GQSoXzWrIZQhNTMbPMV+XhysxrVoWriDYzXrNM2yyfe7KBfwQK3IsE

QvFI1EQDGluE2lMpBgA3JkP2RpLJNnMEHENmDaB8sy
mhuwMTkbmB8dqz76spdEHHHkQCW0ZVJisSZoNBZiOTVDVALpTPEfFRccNiMrpgAyCRKfFCOGMu9GZP3k

e0YuRuErBEYgIcaEHBfCUPWiXn5ioCY1DNkfRM1EJqXqUPJpVZQJSZLxGiGjBbApXBEUNgFsS9T6CITp

IDAgUgo+OjhpVQ0Ntq0zY0R3PYxoFFOWWG5ZgTLxYM
XsF6uiLQwgZhQjJFQ1fS3MXCQrKV8usbLdyMK1IGNmXJ8zIELiRSFaLeRcQM3pJTP5NILgEyV+PgogIC

6Yz7bqyjPfWKWvZHa6AH5PQNSjrPb1GzlSPMmmBBBUFP5+Cj4+ErToDZ4xjuljPWXpQA8dukm6UZWuIu

MfuhCePIT8XPCiEmJwTWH+CaU1ANj9OG6KRI3GGQ0T
mVG9ON4+XSA+OondtfXnYfvKcLKsMsqzOGQqHuLoFBQfWFEnFPIqZoC0ZmLvLkKVDMUxJHUjRDfzBOHo

HLDrSQMoSCtsGDXlMAJ0Mxk3DZExUMFuYU5rPtJuZFZzVxY3NMkiJBHuDGMwyaGDHQCmSHTeMJRrOUAc

WPQnRRTqEUkvDGExQOZsJNN6JTLcLUOnZA2wXoWbZL
RbDXB7EMYmYTKuVBXhmbXXXHTpMWNtZrhmCiVrEKZlOWKfAPqvSPMuCVZkYwR1WKEfHURkVW2cRpKqAD

DaENF1ZQprMZBzSVYvflIGNCDhQDBbXqo7TSBrUOXqHGPkFNduVKChLWI2ONPnJXPaUSEpIQ8mEyXyKV

OyJQV9NrgoPYJhEBIpyeLHQBGsEHHoAhu2KWMgCEAk
XIBtTHqhGQOwQSKgYHt1ALCkTOUjQL6oMsYoHZAcIaToRMvuNFEbGTTsntJOHZEfMEZkACK1KOFiMCZv

VRLzIYtdQTFjTKEgDbB9NZWxXGNxBI5pEqGmZTGxDbJnAnWxOQYtIKYcnoCOOGOaZWSjOse3FAIsJGQg

CZAdGEibKTJqMGD9TNE7MIIqWHHnYX8zIjJxQELrKw
o6NTauDGFmUXBgouQBVVUtSHYvWUH0ADWeBIQrYJRyXCgrXMQbZYR6WYD4GOJjEGHrGL8pSkUyHYQjPr

A6VQAqQJCbLDUvtjSCBJBqYMFwGZF7BIKmCCSyZTUwUUkmCRFuPQItRFedHJFdHKFpXE3nByLfRIXgDd

McSZFzLFFoKTZgsxTJGXSaLHOmBGP5HEEaQSKfYIQi
MTjaTEVyUEK6TUheKXCjVRKsPZ8jYeLaSAQyDjTwHFWiYQBnMTPaxqRNFTRbBEPtSSH0NDRkTVLmYWBh

EDpwWAJgOXN2ExYpZUOvJBZuDP6ePnIeHBmkRBOSVWyENOAlYx7lpJKtAHMxOyzjCS0QxtIhLDZrKJNF

UxEvK5oTDDl9XArACXL6IfD9SSsYNdKTCRS1XVR9ZY
W2D2LwYCUkI0O+MHz0D8G0KRtYWgNxBWJPGuVQCQb1OwFIEWY5SqJ1PWLhEL7xQvRrX8CumwWtDaKMQw

4Ci4JdqfM5gtWcWdJ6RWC9AjPsWI2CAt==









                    ID                  Date                Data Source

 

                    [id number]          09/15/2020 05:21:55 PM EDT [facility]









          Name      Value     Range     Interpretation Code Description Data Josefina

rce(s) Supporting 

Document(s)

 

          Discharge Note                                         [facility]

spital 

PFTMAb8lQwSFXvbocY7BPQRgGI7xtxy6GJbpK9QcRKFhhjSlBAHvG9bjVDGJLDSDYKFinB1xRHLtVazL

vYm
oFCGjRAQZbKuSpSnGkS9ojxyd6uEF7STIhRmqtTN1CsUc0FMMrC4PxLQOyFWBfb3KbHo5+EtH4ypVzbN

v7vQ8SM5QGFMyU16kxQr6uQJRdwGozvHMt2eOEersLd4OwpsjQIHRvAASBYUeGURCIR74QFSODElOBNI

gaoe5mx8ZlVz7qVoyG60/ZrfmxtVVb+2f7/Kati5NF
jZMck1e3raoReAclgnSPJGlBlLBJ9km84vHMhnPDfVxFB5yLUSwGBb5JOsT78cLQgwYgIB+T30SPLzmz

6wjWc8+Chente+0Rach7R1/Haiecvf6/4zhXrMMkhKxWzRrUoZIteYz94jZTcOQahCXcc9CEURGZg3aHj9

4MUHyB6coQ+dFzOns6g7ElIwx6/PpkSIUnDDHD6ujN
JsYQzZJJmefdDHnU0ljAobywl1vNpJU0TgL7kGpMH/sXoSx1XAW+WqUy0sK/95/FMvVPLqy/+vN/gf9x

GcnW/92rVnRBE5ux8d23yUVO4KIMM7I73sEBGeYLJ59h6QfemSNAhhTREHMUj0zQ732NdnTQ/IgAakgD

tMYcdERncIM8LIcSAbzLIebW0Sd13XKwOYQpBRsVCA
jiZLAYCXoXMJKhxMH5iOfjUo8XQZnGTzUivB75RiyCF9KR7uoHHxMtcCLPFNPRTSaqCNQCOYChMIoIPJ

8St9US1NUMTLVWNqIIJFluGACVBOBV9YE77OU8V36Hc0BjuCRgRS5Of6P/QJRsBEmAbLwWqwHsyEbWF3

2A/eAkfBSXAWnA/neoyyNqa80IjfzA/DozAffgUvIg
FMrNKmNPM3TEzDZtPUQFXLMpyWfNtENEEcEhEO1OQSHJ6x+Vy7jCggFikIWhW1DWwcV0AxoSFyLAwhv9

HVyFPITmQ/0y1nCjoE/QhinJUM9sfebVgtFXOEZlzBnLlPAuwZ3AKEHVpa7O0JMiZV0pjZeLn0MW6Jas

YXo4+u38ML8dJsWiGqQuAAjmjkYLLmsISNWjAVF9U9
sbgZXnKGQf3jJLdJpVHHIWfH1NJqrUYKXsrzorF6r10RsgLGtwW6Hqxpj8pzfQVrraZ7ezX8BkhFGp3s

iffDx+A02ftanitw+Vh9pyPVUFrxNlZorQK7DWcCWnj5YSQHi0COYAmyWyogmpH3J32JLoXlEx+SSCQ1

ul7owCGL8g1yNc8bRSJ5XBRS8Yf0xuSPxgPvMZpGRp
Iqa9KuMiPpxRt9j9nUWyx+S14IoFlnxafSmpxzHV9MSK45YoinKkWgDJOXSvIqKxpHxkh9B1KR5ZnYDI

Zck4KhigEqNJJMbmq3WNP1K1MDge77SKTY8PffsSrzQe9E0nGmQGprszXsBY5kCjV+GAoNK2AZ8F22nU

oJ/Lm4uO3FYj7WAqOixS+iTWJEYklSRtJGkiNZKNku
IOv9GHwr1XbOX9TTgwjqiXAQZsblXply+rm7Siw1UNnFoEtKisDryZfpZObKI5GNSxs5aZcs4IDixQrr

HNPxeazPbBn5rnxWwDz4qYmk/JwCVcFKIUahXOGSwkuGOMOWEc+tSJMwUaNxVB0AQhUsUNySm1CCcwsV

2hZglO9u52KStaUS4re7hyxfPkfzIzgdhMoCZQNbKZ
Lg9rNuR6pNecnejZi78lgPQuZs8F4Vv8Lj3E5raQDsRPU8EtCxpQZ7gPzyvhe508hEQvKm1By4Uaw3QK

3E7ByfN1kI56KomRMs5cNyR8ma2odszRa05dae8oR5jBD1z2Fv31ryzaox8LbG+6pvrB+v36g/YUAxcD

PIM+gx+BAAb4SmKPK0OC9yozV20MLp382FvUgmiN8w
JVIbJrnm4rVdF/0qVtlPL0qhoTBYLL4vaOKvY6HV9ueo0i1kwLaaNq1KApWQ8gccZoLttd+r5ESZn0kj

zV4aneKndFHObNBNXhwfaCEfoyVDBsQHzj+zvE4i9BK97KrJpo3EOICcu4kmm3rm3tGtus8GwoIqja5j

vyq1URoFv3FMujQSD3XW92Qu0TjVrgVHN6kHnrTkz0
krPBcDm3gSMVooCffGmvbk76LnzfhSn789sqvy84m42f7xTqjZDk/N448glVaQowyyfCu9rUXXj7rLf6

te5q4Y5q/3bB78hAj3Qixg+Uz8Qkimjpf0dSMVxt55hbT/CX8N/1T/dvAaSChNa9LEjCRvZKU/SC9oR9

RvHYakaELtSNtVOCoZqUCek75VMx+XXytBmV9oZm+S
jcJyUujb0WrummJdG886VyyNOidGJeE03Oy0DCmovtYgCjORMz63pXtOshNIe+e7xppbsqkjFQGH8PhO

ZdTBqLlo6+iK6PkY+8hagBfjYvB9jNjoImQu87ujF+WqM6PMZZfqwHQfYNu/u/o2hT0JewaKOWf3XHDb

J2xTYPkxVsAWsOB0T8W3+pEeTNVI/T37Yb6kuLnuW4
pA+rkMsQxuxmCmVua+mBhrt2tQ8rgsXxQkkqGB6etLUrA23uiATlL41wFs2+ucIk1i2qHeY3995G47km

l6PWzp1oFo9nqXs2+/Te80ymAXZFvEVujOM9917Baf/z/dC+8bis0e7dJIwgYyHrvhz9JDvoloQOnQ

+LY3kdw9p/VGJScrQUXcotHTtgfeBUmVhZVtnUQc+D
khVW2aHtu7m7TtmWWSZCynz/IBQus1EpicpBaex39qB2xYJnsQ9Uc13q8b0fuKFsIjGWxF044vjTSyM0

QuDn6KX7r03qq4aZoYT76qWjVhoBSnxFLF44N4x7HXS6Dgf9mJ/le6q/1wV3jTN2uxGPtp5vgYuqypio

jw4/oaummMQ491fGdzGtuQod/bH397qJ97U978wf2s
5W/nv4q0wI4Tu8LaYesCC66naCf6eSn16i68Bc0tmK69sGFtZp1CyPx3mGe+/J9770lCNy4TJt1gjkXP

em+ua0ZBnQdnzl36j/4Fe7dGfEgx2tZfAzjTDW4ebYw+P0i48c6nb5uGP6l3I4gKDE2L8DXERzwsapz3

nxsn8wOz7w4UewZWZxkiJ23b6dS2rs7Ij0PMR/9nA8
dJz/dA0v9iJ1cfzR2g9bS+x6gnpS+MU5etNj8JzFy5x+7b974G+edUjpeD1g+cQUa+rVb8m/mD1Qx2V3

DBSgUTG3shn2Pk4q5w3MiH+nXyW+Wp4v+J1l31pPXl3//sPmj8GFoIXpN7w3K38Wv5V6e/Kd0bu+Re/F

Z+3W9f0pSM5P+nDqI/ThwJrNZrZQ6O1nsjo/aH7p+e
r+0yuRhrxH2MBUUbQ7DlXYBG0D0VLDZyM6eXWLvL3plNYrBsifPZzKQtr6Y8iMfe67j3WHVD5TS+CXDY

VHTELxerSUvpPNBKsIjNoPyXU1B3ihSrD2YHKFMetCzomuKdKEz6uFAOy6Q6g3Rs1FOA2rk1DkZSUiMw

ZlFN9lnyz3MKInr5AfWlO9FtVCKZ4hc6EbDybgHCHk
QvoCIk4CJ9LMLPOjCFN1VLGcJj4LMW9av2BjNagbWZEwIvvJTBnSQVDlMYesHFYoEDL1ZAEmiAKDGRKt

W4GshGtcZMOfZA9WXvGwW5xxbar7bSV9KFAbUyz+OlnreBIcGN1BYY78pXOei0K3NGGoM1opCCGle09f

OGnaMSUNWC3xFARZBCzfCCpgVJE8DmZevletPLYbPn
12wDr5mGKfQUAxSTfdcK0lOkb9QfQkr7JtQv2lFf4sqSTeHu8YHASgCqiHHLEwjS3fgjS1jtNaXYPvqE

LhEj9fo8s3YrvqXe3fHy6xAOq5DcEfCwVsSSPwCy0vzS01FQljtiTkKy4UESLnHsbZRMDptfwnsMlcdw

Z3gTadoohnBx7iwQE7dYwlL0Z2xhrwo5QoZ8QrB7Ie
AG3glbNsZfYuEG3pOVPsIsieMw84nD1pRj1RGWBeIrJsze8pkB9trRMeeDOtxHemzs6lARO1L8HwDgNj

jb4kaT1XRRKpHznjgvu0UCztMsuxmC4vev85fkxoPDN0oKAeS2TlU2X+CjxkYzpkYXRlPjIwMjAtMDkt

MBFLBXa0TlB7QPBxEYE0GFY2O6TsHyShlFF+Cjwvcm
ZmVdMof6NwaOI8yU4kQzv6tnFoWqGdx0CwvUT1gO6uCSvqsZ1sWoSqWb2ajGY6bVqhC27gAvWex2LoVc

SjtR1kKJXoHY3xCeYdyvDiSvLng7T2OPSsOkh9oQLhEoCdz1A0M0JwHkKtTRYvTHEGR2DcNuRzk4oxfi

PeLlK7U3TwLxwXbx0agDVdxq9JTPIgBfeAHRWXBUDx
kR8fKoIcXFgweYKsEjEOSlHjnkVqi60+CkqmupCiVwQgm7FaoLR4mA1fFsw7tdVvZoDru2VyvZA0mB2t

FQrroC5tIpdchC5zoLN8zVboR37fUdQrp0WoZfEykH98CHPdYE6dDiGazvAhDzXgl6F2KODnHuo5lE6j

NrMsARW2s9SJx08oBsZyHVDxQTEHO6UwOsCwm6xgim
YqYoB7E7kxpEeFuoLzwT8dMQ3nwD0VFCqdeDxIKNNiRFC7YIUvyHP+QeVxCM1hQP6lOGEiApdzASa8OJ

3tAGsiBYtshO1pIc6xjWFoKJOfPXP1LD3ABJghjFfUefUufVULQCHvKsZdVuUzWAnrFIARZTu6InE5UI

GuNDC9XLM5O2fbsIuAdpZzgIBSKHAdUip3E5DmGkcN
IEMxumudnFnaiw5OAF2sQHK5XcJBGeo5J0d9cU1ifCR8KJ17N0grTRHdIFIrPN5pSLJiKb5+CgplbmRz

sIKiFZ7XSN0cc8XuOrquFNWuEswIXMG7TgGvHV0pjctjUzUsVA2iyfj1ELdwPP1XAT2zQC8YiUWBEUJf

FOrcTWCoYX1xyjHqiHncWI7KMQ6wmXqmCBPmGTQBNm
UnQ2KonPFlgeZqZtqlxNNWBYEtCLPNOWImN3PyvXzxJSCaXZ2hW1LYQCPuW9stcDgdSvZfLwQtW5yygC

vhfLW4LBpdOD1RnAJmEWKgF29wzF7sUL48JZuPOHKxA24hs1UEeSSiGZTaMKB7sKZdZ7EqnLv+PgpzdH

JoBR0PuSlmxUKHKNXKgtI+qA4YYyULUHAKUDGOIUVM
DCSLQLMCMUTCWBBYUHVVCSTpDAVMSHEBFM6zf8SkULYaLoErOO0kdxvrJvNvQM6fdef9OQbnegQeJhuR

SZPpCCImNbfWSLyFHHWnIxDvIFDwQA3sQfCqD9C3aADeC3gJQczgV2TQTBJdTCDcE2NlDWK4GUSeXroo

AL0NuEx0QDXwL4KpTQJwOSWsg7HdGwXuS4S1DvE1nI
ZyZ5tjWDstRgWmA3vwFHYiLXW4KPtuMN0NFXwuuEVhEfsIFEZiOpt6a7ObdoGxeBAypoBabFZ7NbItR8

CpeL9jY8DmM5FcHy3AH8HZOQGhDCD1GDKyIp5GCUIvF83uu4esWXEnMFSWQy9+EfN6ufUdiTu7oM7fRn

xV7/65u1462azzf0rSOcL1qWihRlveLYcQXQMdI0xZ
RSqEEkqlvZeGESXKqsjMLP/FTAfyiBn8KV7W1/+480435O39fCp21/18Pp/x2niKsedUEPFVgr+N6urq

zyiampr/hCtUDLEQap02hVZ50ymrDhHtiQXBcguKuoytg3b57F2qogZYTTz3ZGFLDwP8gQQ4LmHECFL+

NyAYDY8Fw0fTHltt/Y2LOvsdxHK9vvG6e8nGawKwgd
sNPTNe1IIdwCxaTJuToPZhVd8jZDSAAxn/F16/soU7diiMRf3RLVTKCEj+tHDxO2s5xZdtSZKKyBbNZa

HsYoPJZZTxo3AxbCxu0Oifa8WvxFGSBnicKKJKk7DoFMrJafkx4wb/iQgHIMFjt2JpuDbv+AnCBNKAT1

VU0kD+IFCHrISkEJnMUlhYYOE/jvr6+fqO0JhTFWgc
DJrdors7eIEEdVabbLGWqXVucYP4wKo+tt80v109Dvvb7LqR4ga3nzmUs15Er6oeCOTgQ8ytp1PlneDy

9133eAdYWqRACMmW4pM/nXwWWGDhd+Pb5839O+aL3Fj8OAQeQdR+IkRWCEJRjBzU0dNRkBAFs3Tw+BUU

LhXqrHg7DYxJs454RfvrdSSoxmYXeGMzfffuGPJUsA
UTUQX6MXKvF8nfohoPDhix4gL0PUUFBpBNGZxcMD2Kqe24P+eDBRZY+Q5jtyojLD1vfSV6a5qmnvVVuC

H1cq5cSYMO2EVNwuMeBj10y9YGgOVvjoyTzvsZwFPH/p+gpjw0n4mL8JdDsPLIQe6mtTM8UJMmzrZONA

GQKh9uapfKAkU4/Uye2fyiM/90tlhggYV/Ne9zLjdX
P1+CzQY5oGEoP1QWCVWKuCTd0ZBakgVIued8M0fwZxOWyFZIT8rkoyW9+/ve30Jv7z8kiRPcZiw1+Pps

8pb86igrn/lAGKbjs7HZEMEsMEbqBOfiLDRFwkSOoxwI68zc3SaBWGiqc7+vt4/PFu/tAQH5r/L/lTz8

UlRXV1+0sWjf7eIuvMsewlh1Vbbe45+cOWRRKDim18
85342vCGKx10uT42+yjPHd+/wkA1VzB2hWh2nPENG3EZLG+aZ4pIk73+7/ikF8TUMfty3fjMudg6AsEK

/Tbb58+JB6UoQKivkno4b2PiNnI50UutS/DXpJA7+boJRcFvhh4IisgBTlwqH2KG8nHdYcJAGJQufIN9

iCgTS+fq02h1DEm4oQB+tl7VT1jHoxfYzBZEaBInO1
T8YnA1TdYFKstIy3KNqtd1+qNMg1qEwQq4C/vUMRhSUXSoABdWkP7uDHmIpQyit995xg/o2s/OzbJL6s

VMjiFnhogzCCfwR4Tzl1XHFP6TRDCDytu3EVkDBp4lFeNWF7IAXs9H/DUb0NZwA45vCubndmJP3OleCQ

V4+HRILm/HjM1oVK3Y4vCCEj6kM5L4OtH8+5fftnIi
9+LscqLQPSC2d83ddhaAdLffxuCgqMYuIKYomXzPYhRickFT5LPuYPvD99svkaCKsfXZvFG9QTaceRB9

eyO4V4qOZPoTQqH/0YchMh9FhucapAWDgzjUHAPrIoUQBfwqytB2QRs3KLmXZ5sbs5RcBBSL1ypKOOZ/

gzKbU1ifOt4bKvdiigLhql54neCYOA5aKrYjtWxwx7
XCOgtrtj1fObrf9y8sjkyo0pUJ4ktStzHmOp+gt54+xUXFB8X7GJvTTF6XcUVduu4kEBEy1EFFVxql4u

1qePH/4pSnFAcxcRMLiEHB6a5za5SEuosfciYqJmKeCa75N5O8WHYNxaDO65HhhjeK9/Px9K1xfzZuM9

gTINWy6xC13/TjfvhE7wbdJ36sGrMvvJsjjg4T+RwB
1k+xZX3GLtObEGU9iq6xUb0KgVaAYlSSsnSfP2MRDbg5cmmub//+ZVGLvfO191qkOaggfuZP+Ia2cGZs

tdQSj32fr3Na49DF6fq2ns2s9dOVf9ZWsZhSDiQ2AYLde3XMlDucaLTYrNrlTnvvFjS7jQm3G16HcBc2

/IvvkthXqvr09GBvMeHP9ceNRXN11cRd0qIemP75+X
zR/Gi+yWfwbU1CJaLimU/0WBLWgIBZWa71QAuk+X31zGPwmpKptXk0r1jkyYgyMLpzEh+Uuwa1uuFYt2

YfaVQxtpC2Y33sEkWzgR9iFQhA9p4/vvpL2CMYSRqtMSA+cy1heVdF7CR+zH9L4pT+aVXGucp0n/XDK7

6EtcRwLnXy37fxEjJHhdNtwo/TO2nomdSy5zZTTm1U
04C2T9UpjT+SCtCUcnnRJBGHTFJi8ukqc/RvP7eKqpwW/DKyUqxsfLBwodwzBPHz+IERcYJCxZF2MogT

P+uQkEeHc5sv03QlPknyfLboEG+CQG2+A0OOAVwBHcuZ42yMa/prcfgXYBMxs79euysUT1wLq//iCHWZ

zCDxin4c04RMIP69iHqTRsFcGHG5AnZrRUUoG3vNj3
eOGCtroGyhuzf0/eoiqy3PUzobpuNz6mcim19XWhB3ScN0U/e/dOgJvEzcb+q6uYdS0BA+iZ6oXtf369

QCPm4+Kx2lJfH76di0++jTpyBGpEXloG8/lsGlRWESBTNHmaOknlGe20ZSpj6xb0QcdIBRRBZHWy2gb5

cxBGSEBgypTJdXV1+G5f2neIW412rHo40hK2R47J2R
o6goNA7rGxDEZP0P/MQ1a6zyMUSkaZjN/HUvFjwHhD+jv6G8mmEmrnwWMRa/I7jFBs699c+2e6TrimO5

deyects9CEwofnFGYIz5lfYcmlyB+8c+sLY7nJTdcQdEc+8ENfzIJ5OVXGwlxfRba/7+yRS8E62YeVqk

GRaUNlQePMBZkPWsV57JOrWbFU9kPbDvO/WXn5+/fv
Pn34BF+/kdD1hZexyMt0Na67Bg7d6WdG+h4b8T0sBPhGM+LGL1/xlU4aGIfotUh63M4sjqnrjcj1G0cW

ZyNDFUqwf85sTJBmPIVYzA/C5xP4vXLfuaTv26WLiknam5rmzVlCBw9AdmRYdooOwEkvKz5iG4BS3ca4

Emgn6sRhUHVs+5jpnx01Rz4Rrje6BJjcah0z1/LliN
TeoBJJzPRWrfzDsTRI4neQUrcNUDkBYvt31WXrtl7nOt3p3nQca3H9hogR6LeYjKJyaCOz8Udgi4sDuK

N9ql4PMA0iKDzP4F6dtLZ2wCczBzjRmvDL++8EYf8B76gieq+uvgYBXKrC0xMsXCt0GXSyY0v5ZNXL4e

jyGdGOvi4bMOIVYoc+lhYkPj4+KvHgnDAQQv0lQ0oF
PX8+LDQsJDgocldkyrkUqBeaYL+QG6Oc855giUm9gJUNrb5RaoqVUcNDsLtHP31r+exZXpQ6+sIbndQB

SQqB5tEU9Ddc/3VuJs0uSeWM8MJd96hQu7IEc+vrtavXDuzb/zq/oL6+qwZpeCPryZMB/QZAUU+xn9KO

VS2ZvabVkPNWIUDKeXo8DZOCbNJi2wVxXnJv3PuQ0F
xsiKecvAKIPRBGiJdfD/WR6+TJc81wM0707euJAbLmHqXPYUkEvbFT9SWTO8/Pi7xFOEeDVH3auYu58g

cp7uef3LD31emyBPYUSx1eM0lgMrJ9BaiaVrBs5N1f2wsWMYn3CSuw+viNGJqhE7AgnNWBDBt7zzLAF6

cHa3aXwgIzAN0+BWmZDevX0/cmLLbAHYFqVKoAiRez
5KCd7ca8bjiqx/L4P431Sz8sz2l5Bu/vdqr3wGbzIMVzdSh60861dIsCgwweSe6++FVUNtxp1Ra4U1MS

QfxAAbfWIBu52kWIYdzY+wj0PyZkIsguwMOHSgkcmdWl164nzSin9QO0VL7HqDEY+/dpG7+fr5+UuBRx

WHVIFLtXDMyNEajOdHeQbGFctI22f1MehqM7jt05KS
HGj8SNjpkNl9xD+uRhmmd9TQCW3TEeH0Q45jZovHsOBmo4F5WADBoQMSQaHCaSSKStCgLv6bVtTGyriI

QqfYR2a46XbdeFpchjLEoxCg8yVjgbXJNik5ebqOzAUAzBhtDhm87mrmcHG16+aaZwQAs6rLgOtPdL4w

iqWikfguPRYSqWQwiSOTvHF8POCWMf8ilYJOc9842k
IyWFzUYBpcCzijJyFoMH4/EfPniIj4+NG9zssOZxCdQLqiFdLFYapXlN+uxZsxrq6/VPPEi4T73V+6tX

rXZxdoY/VWLXOumkibsgQkliqVbciwIGyHcdbs5v7unU6NpXzYjIsVMKp+52WQX0gAPmIuzISFskYpXU

0t5mAduuASj3jWQZBzE7QPn1yFZlGbqGFjJkRJpeBD
rf9YxWFRTZiVjlQInO5trEIuhwY97F95rjzh0UXMay5PYncEjC7XDB7zZOHEJLfshQX4a1DNCi5Rruzz

lKikpsKHVDZVmPHAnpgTrl4+JGSNLcovBNITH8/fn5PUYqBYM4dWNkfODyHr1gLLAKXWiXj7ZF8Ybgv/

f+2Es0eIfLevYXsG7j85rWd8xyaTHUNYfhxdV62Dzu
6Vu25Df+erC013YAP7xsxvGF34339+7PUgvouHohxlXJ6bvaWjezGjUekrB2xU6xCI3vRG6S0cpQYN3o

R4yC9A/q3x/qADgRYI3PjTj14SzIZrhvkOUhivdHPdZgI7uYI7zA5PeHYCZkPVLq2OJu2sXUVoaeJC5J

gyND1thKWaRA+cEvGP1wJKB7oCvX4A5fOFk9YlulGa
UyP8WpnHJ6rvHcgYJ2ws1I5CoEli69veW7Wa0y3EBJZ09ZscHRfIwciIGbGdUC5m133zJf75wn8yyGfn

SeJGdhYLBtfEcHR6mQb68ukYuVdnrAkUIDHCNV5+gKqkb06maBpE81XfEywCrBSN5xy2I8ALIKJKkRMX

VA5Vv5Jt0eGm00sCEPeUEvBxZdbFIqwddkhvs4CidL
AmIuqvDsSy1k9VV/63jkDVYDFgQl82qd+BbptShToQx12oLPugNbMFAQ8bL9j4P7wNHAfqzByta7K/Dh

w8cNDV+gIZuv2ubwkdCjQrHIsxn3b6nXIDVOy7sUHSi/88/LnD4jbppAS4u7KueTLrD2I+6XGvkoqkR1

E07zIr6tTVS6+U/hxY/ra7c2lefWM9I0WGIshtoN7k
IxXL1J4jJPMEIgYkRk2Jg+fPECu3/r5k2n+QtA/bVFB71PyI/MPVMu2H1pFX0Tjj98/vHjB+w+vHfBPE

eoViD/pYsWf+1FkrYC8kTLkpEfs+gvJSYetGMnblU+JnANA31SqURERc7uMT2Rwm5+fjISXfIGCGaPHz

8+uP+BqiKGujrevmYokdw8tYEJiT+0ffWkX99nFwHR
MuSLdg3yX6TNeE//MwEaG15vX4BlmPx9IdrF5qRTYwfBjO6CvSUu0L1NzRjZ+SgVjuev0lRl+U7YkDgO

KENJ5is9emGxVqU6C/4w2tHGFaZc3PKJ8i3mkyKUJt59/bpx/XoYjoV4+S1oSowM1zUohdGnB/vB6NzB

Fss2a20Yxj2bD065jUOOZof1eXlJYVP8tRs3BssTRJ
75tMFvrERJ60A3J4UQbIbtSk7gAlJfFB6ydaj86zbrDqGOPIAKghH1yFrtW1REmCEeAwn2cWUHlCm5xA

LIM5X06IPmR8JR6oUdpIkht5Hbgi2/wGVB5+Xk5DR//pFWhaUd19/F02LqxfzLJ14/xopLUU7+TAKtQW

ap1mFMpQgeOlZSL/VibLqi6Kvvo5cPHXO5IRILKIaH
8CF46hdXg8zuwXRiHY5xf9PR6Bzt/0H0l2k/ZZcKha8LtVgq1x+I0eJnTRt1C80umi6Sq/v60Lp/tDQ3

mt1vRLKI6AvdzYxSHfiIhnBspFp3R7gaeUkJbYa8i4Usj6uMzGb5HS0ZJsXBpLcXsdXZwbF4zanoUrxa

sBaW27yt0yT/QxvYcuODeQdNVNf6m0nUjRQY356xnt
siDDId1XZHPraormP4ApHyMUufT8btW3RWrqFjVUPuqiQa1APYR4X8f9tSSD1tSpQUa+5vBseC8bWtR3

+05+wnpoH4Z0H4Lu/sOlJxiM3H0Se61yxsg368CEOdE0tkXx9OANBIZieCzUZsCr6z2LklUFAE6GTF6p

9rJLETHSDUwxg6+mTO6SvUOGV+7hOvG7GLDykT4lSd
08sG3p716aHfi5Bn4i3et04V0std6WQIywxeAAWz7PMtysWpmrMH+7xRdWsCCHWh8XlE/AIgLQPtgDjd

rdGb6vUFeuJvYrl8cgEqoiH/jMWKIip8qk6tzK6eJNV3sv/gKcae0xaLdlHZpTGMRYLBkli8MWY51ysP

KHOVh93tKK8Dy0Gh7bK8CpGOTyw2PH3yYs6qceb21J
kh++ZQtE9cIvKL/YAPE1Sn9VWfAo0blhxsi5pPKFVJaz2d7CIk/nzK+suBg8Hvmphk7PmdfFO2UHNdP2

IaCpD+CErHrYLXSou1y44jan6buROXzfii1Vc6lsoqMVKc/DONS6dnsTkl38kaTHLcm0a/8fzFQNN+8N

SmThL+Kb1dqLmekUDmBrqKWp2zgmnm620Q6qmptdGG
j8kHSpXfIVDEes5oFu0jkM5MRRbqPLneNzA+EFWXWKYLiPB3nprO8VySTKfW0sdFzobcHKVnlKmGb4v4

hORRKaMJfMn5sPdj+38uQk81MSYmxO7Q+CF0YyNEC8eb0s2/I3dHpFOYbFFlwHMjfHHhx+FAoRqqalg3

XErOtKFor4T6MWsi5G/4+ygNt0G8v91G2bMQSzTHzy
OfETCyLkKstKproVXTtYn64CJXNXfspkXNtFQwb2U5S0nZWxs//k0TMJSSGFRj3GL8jvjFoiuoYjAJ6L

z6USKwk40jxLUVCnWm57fbLc6wWjEYY0+cFX3s+KY3Vr7i602vBdo2GvadfIqwRQzp7aM5yEiFcJtWAV

cRYVDZBeyAwybry8KFdBZFmO+sFeDAXx2mQfZdEyka
Lv7aAsou8wImAqQvEVaSQdag8ByYVsvrSZS7FX3c0jXprmsp5a3zG14DN+hbDuiSnz9/wijmkWPK6gz/

U7T6kn2vO+NV23Utz4TwuAIG6O6i3gSbEanxWZchsKaYcP5h1fHRB+kjb/aX8MmfQLWw2lqA0ChSzduX

jraNQst0NbMoWHKxC5ZGW83+V6Zw+HpvATkKetm+PX
t+NBp5Z2OoieJQpOSYaCCpX73jbHuetBOFRODi1e7ptw+KBZyMV6X1PuQ+7ogHuRxZ7enTQ/ax36g8W2

uENL/h2O9KB4y5lpVJVWf20AVKewLSVQGhVAxfSJMoy1TQdAUm7d/K//PhD0F9Xs8UZaH6+6aNuFJ4uZ

KoYhp0BO+WZDjX1L067blV8WHgagmSgylbSYdXuI8+
uaTZsd2X7TlPgac8ken5lpvPjAC9fsRvcT+Mj/FBiKgDtYqi75BUOjGeIooCAb9mPvuhpHSN5dFeTDc0

vqfcY7GdwGOSFLu5KglPepyAE6jnHJVlDRNBMSTabxTHok6mynSNtDSvNH2P3U0vwiHQJqT/GjQ2gJi8

cRcJ5szA7eqvc+AANf/LhfbqCb5kKeRIo0Xm+NChEC
5kQNEUpVyJaDglk4aji40YyEvSbudtudejeKYC0uQz5IdexDZhDAiIZb6oOc6emF4GyJTUSvWLAo+Pkp

j4mBVHOhntvEvGTqUc/RjZ7vQ8pCCZWjvHM5jz9WmCilimEbnVrPBGeQptf4nciKngbFNctZThaDEDBS

AkKEKUKeANgNJdNg6RH/ehA6nG5Q8PRZxylfKXTbRY
lAmV2e7kAthYHkG0aCuP9WUDF6MWyJ/1ETbwszwORwb4jchX4q+0OzAHyxwf87AJ8348c+/CiI/4DJOF

vJjU9HL2QBg4XY2giMYXr6+nyR0sqq0f29hM0dJGTuruFIkfpEbfUP8UPPATkWsyTLGL/Upm1NmlrRbR

LfBHN2UA4BBwnF12B8kOrA7Au8RTMgTTabRMPhpNc4
c89Ohiig+WLdidH+RR3UF2Ev0DIJSREShNYiEruyBUDrgCsWWBQqVGtJK02mJsqgbyPLfYXDRj2FdYkD

xRsvWolfewph2Ce2kIlwSQh8JQu4Fk6T1iSaqaW4bgyPAezi7NETCixF23oNFMPa1cCUxxODqryAc2qF

Lbe0nRj8xlCwALvfX7Vc5zFfmFFddEWVB9+qnEm0Um
/zBd9FRER9R89QX0Z86YeDygS92VfRXXt9sgQiq3rurge+dofRBYUVWcyAU8FyKUPznTNym1Ptw4pUfO

E/Z1RXSATDyL1Iy0b/gqZQXRYDMccV40J2kUCNMLFS38eon2bbnAaFvbSog2aO06aCM1WQx6phrviJ0e

N4Yb9oevruCCyczf5tOBpG/Xdv/e/b60MP+MMn1gw0
QobJHgsRfti4tCGOX7CWoomqDYx+Gvwgd144vCiShHJKHQmLWrIMLOq94FN9dHd2Q5J9zMqSOM0Av/fI

WqdQThgUu2Fi8zOrbrg45paZQRp5fQpPH6z4EoiAhtV3tTRTokoYMq7Yq82+ACNO7mtXlQrvsK5+6VNI

etRClBE4LZ5y71D1894wYpcp/XiwUDbMDDkLjK7c0p
wlfqr1+/5hIHi6In7cJNO9sDh5P/Wew267izXF4K9oXvQpF9vFRA4PApfs8lGDhYGLkhLj/85q+yb9Dw

Xx1Dn8fBp0UmoIMETxelDN7lTqDXpIvjLKtVZWUjld0tpEESVi/USkrvUczzaNr5EurxWbEzAvi1Yc2n

JavhgiY01fNfpDUtAYiToJmSUMFRJ5UsfrRO+vhe5s
Z6vb4sstgl9LC6MPwBXlO4y2t73PL/7nVh0JJIofuMezPse/7s+SGZQOW41GnNIFuygcUKIPhWcjrRzM

JYKIn4mRYnLWkKMHAMGHiL8umS3se4zmZ9Qi3sffGBR7Xz8LY6+/mXZF4s9GZJ9J1nU0oluDMlLskbOD

E9GDPvGui1uU1UB2DTPNxBtKXD6NXL7qU6cBx4ko2P
QkrHAneELJpeDTwpQSMPtmGYmNSbTzhpf9zUIwKSqIFvnDUqjx5ejaxKul0dqru7e8XdAyqZLl9M777v

yzxkSUmJvBwyQSYhIoprr+2/aBtRrJ2Fb54ui2vqamNrQIuFN7nwfOhvAQPM4ICQG6xfadbp7E4MXzm+

hzd6xe/jFXBctDPLN21DWxVLKZ4nJMI0cv3J6/zFi+
Lv1c99uPwQm6f6ttkAwWbj+Ta/sF7o013JUDC+skx3YsuYSwF9dRRRHYWyxgHgK1ajfpQA+KaNnuyoY9

2Hsrw3OiPYq8dQLGQg5w4lVkgAvQKbNmzO5PpStwvAmIUyVXpLkhe07kffHJfGJ2YF7mZrD2r50COs1a

3+wrsZ7jyw7M+efQIX3ouBcAs7Z9SLWhTnAaWPUoYr
1NePiIjoae+Ftq9fX+Hj3ZpHDBCjDjzTrSGBGzG166PZGlZ4tHzNMidgrZcn7PLhJ5NELTS1DFK0yY+B

ckywSsINj9XgVWN9DxWYRG2+nxdXLVn4hG+jA4xmfwEtJXou0e/VtRlomzBMLX1FJCsaeN+V3ZXUVIKQ

YASZQJiH/A41aWf3Fv5FzXwotZuPB1QoZb8pIBJDoG
Sp/2vUIOXQaMW2ABiRY/HSv1vOORliu3pT4DD36LzZRu0JP2as8tKIJ+Jv3tqicWr8aQ+NDRrWg5gZZC

9LN1M9aq+ndKuORZt17XODJmTpHTew+WNCU2OlrPqGhIHT2UGef6hrCb63jLf2ucMhck5G0x4HyrGbtY

zEMvJ51IxPSaAGi/qS8W9Ocm9pOrUqp3+3D+6ngZ+q
4kgDYn5XkMXgtXhWN9nQZEVJEJzVGprrlON+ba1DlUhfHNJ3QzNXMIUYKbMRVt9n7P7753yNyIR3GsxE

qRerL6Ss4aY+xKzXo05ULRWuPVQvinJCMYyjmvdEHj8PtpMY0ix5mJurYFXoo/lX0ZFLAR1m8WdDkVBX

EW/L9yx6BgtHSsU2+JkIb7N1ywQ/k6fC+AUKbwxdR3
es7EdnrF7IhSXqtr5O4v3twlBDb/zHeGPI9+rjYxD7vMBUF36GMRROrnxuk6af5ZRumWJMAuNFzBqBD8

i7l6jy1/Y8ztbItGAHBnwJ5Ktuj3xHW5wtaBdJZaejjvNKfFStO6bVVQqHecc17Wa2/HKEYuxDpXn70s

4XlysJ1mZRXMVYt7HRSVULKe9ce12PLNXUg/kyD/wm
M35DqUOWftq4nxpDosA5O4E3dHaxinlYbm3IEmBVbQTIEh+YBdBvlh7a8F8z91T/xfmne2uzdpEkcobg

bQRMVcsAOB1KGX5t/qn2xPrGdVp8mnAyZy4WVuydiBs+fOtrVHO2pnvFTIKuvOY7HGC1+HOM2NCoCsRI

NkJn4t+OeZNycXCAXkO/SlYO8MCfmkZX6NfCSGx+bX
kfiMHNrMgeg90mpCm2t7/RvDeIzRNhvhOriFYLJ1K4f7x9fOho14EAEdYAPmpzeiB4mK2SpMjtktdyOI

6TjXa03fGYi/MGsvBioSrxXz37IU8S+56rc5BiCQMrC09DaHIlBWFf5xqR5HXW7gRpo0ib6RqaJUHJB2

q2EnlT4Wlps9fiKkDi1niYFF6C7Et4L/Nam77zauUE
Wruock+4xE1UMyDoxSjU36I8kQZ+L8KQV9cGoESPf70O0NoGp5wccWczVAiJIedTC/Ycc4q4J2jGqjwO

3771SQfvix2e0N7qBnNrb9NRLG+o+VDPtE8hVAgDyWRGmwhWZ245K6NeV/WOWpjCLczb2dP31kKDYbsM

lxhk1rsKhB//hlE5c3v0Yz/rP3EJl9RK1jwpzlvtc6
Lme4FpxuKmxpyWTHpEqu6C79e6+jip2j8EM3YEP7KVP0tn+WRTt6F3K3Yu4+/n9rYRIwYoB6HZTmqBDo

W7MoIF8ZCWfTAOoXYYJs4auLWkK6mTGUfekUZxUZIgu/jPcwFVwhiKuxG+f/vG7QGYuTJg12tyqAOXU4

RJM90DjNno+sY84yVuzdif1s/TzUpDsmdOm/HhY+9W
x7y8V/5+4qjPkcVAVvstvtdYHJXm6zQzxohpPtdmSrAdfhBHXppsOjxiXNipcRrGUDFbguzvpd8kM4DR

tpQcTVtEBEkrXvjj18iuWE2sU7HV7eUoJ22+w9LOdJr6ovBvnBer3FQmJQdIZmwq2Xq284vO3zGDtw8Q

N30j6eJITBPGA+RUoANMYO9pDz7IZ/A7FCvAaiVvEx
ppmpQMTHPq2ZxMhYxB8We7uQqMjy2+gDOEs1MwCt9rSwjpV20byDJCJsBUaEYA2+MBn27TjgIoc30bTX

R5ZaMEzvm6OLPq6zUNOp4pjAz/e/sZ6YavYAv1ihEN3pGLmcc9FoWFBQZyeXjhiWRZJwgbZITl9O7NHa

urYQKTDxJDQRCN3gECW1QK3GZ/p/s+gZcyLhmipQHy
Vx7bl8DElLSK2mVRLVDdcYhqPB/8RB8hdWppmuJUQClVVxiwVR14zCtkvYJMRb5zyG1U8T3AOOENYVed

0TucEhkLdJAbCHKETL02G7TMZ00ZfSH2jY80JZ+Jo7gDmDfYcYpJEhxpPCJL/ClGJ7mCC2BGoLgJOF8w

gGsF6X78kwToYwIWRtYG0fbhhIJFjNdz7oK5VE23b0
AoYaL3OEyVVytKAsq6+Vz7f8HJqwer4lpfRwRA7yH+V05MutfcooKk1se9gstwpHivYgF402smx672Z7

SdUlmtGS8FTwiIZ2oAv2JT+z9QI8lgCgUwQx0zi3niV+1cJOiP49kna9ZWaDOW6O+IiaIuZOxFCedD7x

C+PhP5B3j2qoPES82AqibtY3savaDbbHuhA9L65Nx6
oBbipO9GW/BgGZNsXSJZlz4A/rYf9dMldthdKm/NXUC6m71UTssCxIZ1uhrd1amMUagltVBxpB0yrVVF

PSW/p6MxpWWIqoEUYXUN/4Ef+7xTKEg+U5WteGQay7KrXViZCkFGTbcYbOkNvqc0OYDLOGJyc1GwU127

k+iepxXmOmyCDVWAKs0Nldhc5puAttQUeXkJD1TW+2
/ntuzephk6PaoipXzAP7s+QphlRNlWqKuLbRVzSOJMetUsf5Tayx8+Y2PNl2LnTV0kY0/yRBF7gaPuMD

afE0OfdzaROmqeqqIIhRAv7EKBssnFzWCy8h4CFR+ieTZ5BOox/e2r2rVma0nfW3ISUTcxQhKoMqVwqm

uM7RwrvDkCmMf9fBX6ulU8Xq5CEKRnnUZCbs/LCko/
slLEIdVpYC3jwBeLREOsRa0OUalQdxGAssx+l+97yUe1VycsPCIFdvECo5NBCRDnuEVtZXTMIyJBpHNM

zLTzCfQ2mOZhzEwFBQDus/AJvFpYftYPgXAN3lgtV1Gd2fWNOlDNcPzFZDu5rVcdIxd5wrt7mAjEaQ87

++XLe4fdLotG2GDYyhMEsSno5dIyxMLFPWkNu6MCtQ
nZak5iPCB2WF2UQzWJaz4vJSAB5ITbI5r9TW0yWVKGvoa6KYQlRUNj85qPalSbRICpJpjUrL8gp1KGRu

IZNd9eqwX5sWGX0sJYBIGbGDGl1n1+fyQHyFSvIBRCUaACLFf4BmvXSVJMZZTyAFegEueRPmEcSf1nig

91PxHBlvMWu9Ycp4D3+Tyq5nZ8KsHb40T2sAxG7sMy
MkRLS7V2+p+gGLHkQYsiji/ERsygK2Wqe+HwFcjPrvlgVZ9MrnzmXQuPbcxj3lVjrrv/Pe8lYtpcOdER

DQsYgQD/AEWUUONhkpfiAG2Ki6j2t/hBFFHB1lmw+EwqJUJ29P0A9pZRFdMzKDRHB1fPalTefnHSZ8e0

xNpiReeeY4A6uNbAUWPij8rdLOOHGFBGch2AuG90Kr
WmshN8bfXAZmG1Hj76F/QggpozCGRQ5LCCfPIlOyezFYcz03uIddbzQXBZXLwbeqcuFjDwHe302GBwzh

BbwOoWcj5qSczl53Wt0HBLeTHn6xWHzQymaEzFtJHjxciatmqaH7ySVBA+CumofeN/gjcAp0+v7sApwt

i1dwFeWxg03ZId+KERGFLLEIQZU8iTmQu6NhzzN6+/
qn5XlenPAZGyWZPLoBIAtrwccZSuBBEQuXIfFMRJgg3UGR9sJAQPMKxghaxXJYvBBPEi5jpKCDi8nJ7Z

6CmCfTDb/c5JVHfz5WX+wi7s2C2fCzgrWcAeYWbpIBLCHeXFr0G66OM5X/RSkhfx4SsvonkpilDyQ3IQ

U1y8hmXn1Jjv8ctd8ivC1EGA/JJtKtu8nvEcud8s0m
JMTBzwdmCt+LxsWADylLY8H+EkSWEDZIT1pByCPjG/LC5tDaRDY9zVgtvlPlSEUbNJC+QEsoCgELreWU

qWy9Btg/Bq7s77cFTNgifXBlw1A893HjDwhB79dalbQ3Vw7XING+A/Pbj/cSEZRQ5Osc+FVJg2ibIeO1

4Is5l5zFTVV8aGjg+UdzlNYQyZWgltx9kIfe7MTkhU
aekrLl+5cF8cCXQo7Uq5ZKWlXjZZMAjNxdOHku3X0A9g8F/g5hA0W4XJiHpI7vt5h3qyBjjTJ9gb7UGZ

Oa98AhD/v+H4vYL5qEDflpRGMXSAnXLAWJyLPjfhsJIGZChVqConpg/drqa44qAY5U0c/v31js5qiynI

GSn5K9IBmUS4CWcfnJYusc8ezy4fUQ0ZDMZ/5fehou
LRw0f4/ycRmj8yC8IKdA8//cl9ICJq13dcZ8bAc5y7659tG4VCpkmz6Nwis7OYPyNffJKR4uGmZikVNV

RqkzukjyDs2hh3ueVxovDadHf2VWitdwl5K56Ki4Na3S6fsorji6zl0wkYJ0jwUymL1Pqbyhxce5R9iI

CpbBP4skjLY7wxyZYcBDAR6G0gJjQkyCAQp27f2CUo
JLPXZ1i3rlt0d3i4xIBMrC0aEyii2uyRipssicxKtKpy4eXer452j9qKId1VWxnO8Xo16IAxpjmxoOlQ

OWaiHpYRzndjUBd50ZiTxYGrqNf1Ihpjk3a4rhuGFWRta52htiKhGo6+fLm7O+tiXb/g1iI7j6+6dOHC

hA53nlX9E0+gwMXFZbXHypUeHiuW/96c3HAF4Vko77
xe8/Msr9LFZXm1nZHL9FTEijyOf2ggcf8CRZHICfy4XRwmlVL0Un+tvq/nV9K1VN/xeqhf4RxiehqRvu

Y26TlHHYmr7xyIAtS2bG7Q1dPQ0bJA5yiVvef1r59ct/kL+Ke9541wC/mpy/uRPka8qNe9eBJv7hr4ha

sCtgdEHz+wp0PO144Mxeg4XKkGsdugneYd4Q8OxexN
xbp+c2BYyf7/yDmxCQWwgYrS2ktbm5kok7mrf3mNyLcUjdqvxrSEb3eEuQSIPIYN/oVDojT2MDCrVtSL

IZS28DA7Q4f/X5Cf/+P6loKfjxGkMlzLG3xhGmsKACbszw2bHvjy9x6TE/qrceP2QSpzcVmOcCkbHrCk

SLvLkb46isa5lhtkdCLUGdnr/FqUvH+PrLzoYZct+q
cYyNELJriAoqoxkbZCtpbKo6VLgJ24WKMZcy3VoPtfsBZSCbMgej2kz6SC+ZMg2I1lY7zsALFMxDyQxN

pDr5AyM5i47eOKXhmk/CzDfHLkBGoLncVOeC04qz4V53eCQssUh9V7RJS3Bn0Wv9+0qLB13R+XK6YC25

CTovOnDAexnZQR5fqaZqOCKhM7rkoPrG9clbQZ81lQ
vyzPLPy/QN/B9idU84/V2xUtnRof4dGnBsJqwGTtix8+bpK+qk7YfQG9hEXTRRRTPh2sw7Rb4NFZewGn

tGisRlBEjXSLmL5+/Zaeynjn5/8t/ZJQ7L1Z/7Ri5DBu+roS2KckZSYwZmK2ICcCR9HRtVFvBDDXGRLg

Xo51VLQl+d2EERSfpfYxOLcxPA3IvfvLz6Yr2/27bg
wHfjX7Zimkkp4Yn07IlYWcZQPt69Pr7x7vGfqNKnwx/T1IM6eBk9rLjYZjBlIdiVBuIi0a0vqZvlh1Ns

h6rHOxrAmTeI2vy8cntT/CV3J78wnh2t5xqKIyhL7scGzSziLrtMHrKR0iAcFlRGwQb7e1Fsle3eYZxg

3r+EYbUpjwrRlIVoqo05i3KFFy44qeed961MlGDbnS
c/g3ROH3BMp5v+jXZpb2jS/RhrsQl063u/YoC3LP4VvhM3Y/D0/Iv4eCkVJQv2WBiv/gEMgI/aqBEv3g

mHH0Aa63trHr8PFXJSrYr6IB6v26ReP0yZw80fFPn9Ho4PY6nfS2ELbJQeYvYV8mC8XHvYxDT6CNINkB

9MprZmJiWoOzcMOGUymSKC0P3lmthxAETjDxvLUFs6
LhrsFNy0FavFiOSTuOrMzwiZSRmC7TDOeuzRe5Emib92kO/GldFIwI3FHMPOwTWVKPab1nB3PrSi81KB

Ig0k63acSAeCv5+3b/vn0Md+DfZzORlGcRU4pQLesSDC02Fw14VU3SFqhyZqoJhBy+6dKlUKjHsLAwtC

wG9r3jWwIgqRtrXbp+oTuAl0bc6I0ht9KJqlNT7LAQ
dU9dxpBj7+2gR5felFST5cEL3VTx03Sfzw/iyJFGUuKVCoXnSSBKzAyz6IfVPQgwIiAXI6wSzHlzuDzY

NkhImmxFPhtRaff2mFhHgbX5Z+ypV6subBd3hLTGhYz5Ncnhzhni3SrGBCHxc4aYkKWl6TsD6x6GzIaU

/5365sriEPX4ehs+oFl7FMz4Y5ZH+k/ojoXb6JpilT
s9nm3oZMMfdDYIjarR026+5fUCZOpUFMFdZlmx4OGJLiiJnokiIFrC6GV6aDr8oZyXXszqx6aMF9TXNu

aKvQPY69VfnIh42UBeCBHcIy6ubPnRr4cs5uQOMCN07MXvIM7BBPLaP7h2u+oQ3nft442llwUyshI6nD

OQrW+mGLKXm5y47DQSqPBYbXcG+xqDJiH07wXju12i
zqktBWGfsu34ODJbE9qndA1xv73EtV9BoNXGd9ie9movrAp+eo8ybJUctdJ2uAuazWtHJABqylqcQIUK

ouwL0tAE935rMDMyi2vl/H5mHAwDY5kkk6ShKlAgaQDz52rTy38Bd/lQ7zWJy4el9m3JdrCjVJzKOEfc

zyby3OqkoHgDn4Rtt/MCx/mG+hal6cmNsKnPnx6b2M
1LY1c2zmPKkxzvh024IsFbxYYdtdQx9b7KMT9UDnsGdd20sMUtUzOyUuTah1g6Zl642XN15GDrr+fMef

ffx0CvXCNow99Fy/G4NXeuYVpOMjxROB1RkLsfpVO1wrChQhl3Ys7pEvRzfXbwZkC6IUg8KWKN8UBds5

28w0d3GF4qWpa+9QWJiYlSomLIzmPorsWYsAHUYWlu
Ch59iQQKtPvqRkLTDBA3ihYzHEEc3+63zdjIKGhnYN+0P0oQl5l6yI1RD/RXYha3aw4rW4d0R3+b12/6

pgyoj7uyiTli6e9SZHFVhQ+reJwl3vczry4j35TTfysNZSQKFRIflxeQkj6RtwTv/+zkiJgtWpd9Eie0

4YWj5oRHcXo1pILTctUzN0Le2rvBxP1SNxCrg7KP19
VqTHxc/ID+/KNVdSM5UjGZlXPpEgkcf3O16fA8ZjBRsLGna/v3jx/DXTPfARLGAwRpj6aEp9+nSar6Iu

/RoyfAK+SlZYhElHI+knmKcF7cdaikBMUl4jgvUYwRreGCqeHvfM0tAQzUxSr19yL/+ss0oJ0VHY2HkS

yDEmxJPWe6lrG9zkb1ZZhd6X/XuYqEEUkvWvMZO9Dc
Y7BaHlNoiJ6aVP/OzsZWUNDtNJ/QoODVHiukJCSSEhmcRzl+/Hjos/tBq4jlcuxtYfMWRyZlZWkeW3ZS

Oxphw4D6NBpsGnFf1ez4OO7EWY54dywyfMoGOpijza15Ze3KmGvjA+GZhZjbuhcvdK7+Js1iU42NZXQZ

6kNfg667HHyN2amsZE9I+/mCxxFrcxDZ4fHwPbZUXY
mp/ynexsGqhgSu4Um62fGw41/5QS90chLVAGb90vUz6xZVWYiHVYUDv4a/b7lzIrxHopvmYwxHInhPx3

WYfb77NJsiRZCeTyRC/tKg0mdHZCfgy5wxxY0VWnDJUcgjyyCq6ToPouzbhSHCAsTuvZt/ra+e12jMR6

9P3+T8ONIRbsuisuuYviUAGpfKxxTUAKHafbdM9iHN
gYQ+B68AyGMrKAAv/+hR6h3ObfEX2ZDZLpk64Hh5LPjBIgXBnAKVNhs8EFiEUNn/7SS4F87bmagm4LCg

d0pgQrfuUfXe9Kxqz4w2YvjcAS/R0xFQqbDwnoOaG73ZLgB35zq4eNjqOm+nnj+ko55zMrpL3Dx06YYV

lfEfW7yf6rNVMyxxUB9G3twtJsRs0fKqHQVXxYVmw/
Jqsf2FNfU2adroPZ/SvBBtxJmCrgTFKjHiFSp2lj8WXnzvCWbcq20CV5vmv778zlXjB+wDizY13lzyvL

4cpIZ9pOUM2FZ438NRXXOAKRpTmaQYwcilpJFgAmtUAU4OQZFqnxrVpugCQzU03Bl1APrIaWn518HL3u

ugNFkruvbjGj4btpgONVwVKuNFPoNb9e72/fBV5EOw
pxv4nIoF+df81CIgs6brMBPKmlmcg2lEftoGv+dKUEzdjMIUrBefPIYcALLV4wy2/ua3Sxh09NlO6+Ej

0ok981z7f19+nW6GZIrMowhHAVKH8c1SzdRI3giOigKPZtvvAg4zA7sSqiD5PKvmTh1354W66e30xPtQ

JoHdoFhbY5lpxee7zFs4h+bXkKBgWUlEjrIaZrUVHZ
0V5foBAbXmuHVeSOI0yVjQ8GqSabI8hR0A+qorBcZOjFceBSMxzX02LUke4TDVx32h5dLnwuctnCQcc0

EfUSJj0zVT8xxc5oda8yaDHR4ksbJGvz0iXNewbkJbg3WKgCLFPguFp5gJpdPYUxx/3/2MCq6GKREdOL

oYY0wC7JnNBEjpmLUup8dPpAyT5WoYgCxZ9WZQ1mtN
r5CYFmDVzB1M9z5OEklVfpRfiGnI6hp/7wsTEwPIwjXEcDrzVdkHdEZXYtN5079USsIplrWvYsMMgWUQ

IQXBBb7W9QVDDTTXY3DvyTWRSVx2hOXPBjoYNSozPNTyYgV9lm4CTOFTRzDPT12dEE4deJFHFZrhi5BB

Pw3hbJJYT8ukOWHYcv05hYpL2HUVBd3vEfj0weXvg8
ybMyEIfLTuu2z9nPgaCiU0AImXT4LBz4Vy9smDuKl7fcL6MTXUWCl+iy749DaYWMC96/Ij1oPFqKKRSM

y62m1aCG3HKGqon2JMqvqUAp9te5pYEugBsjoZuJp78l0xbISxkEQXf9/CZ8zATppYZuxqkjPcMT89p2

7d0FoGmld1LcMV71s8mo5TL41nA+SNh/fvw/3YmBif
hTujm7gaqs7qXXqLNQCzAuwx7hdRn4Ia8zJn61jXwLZwgbS+flWSkwvw9/9cVfXixYuysrID+/ebGBgR

bZv5+gytsfnQ1m2DBUdhVZ71Gn4xVm73seJWclBf/8cPj0g3qKYxkeGb+sZ6RvG1+teepQQdqWSfxV07

i9rY97YJbpiRIo22JUuVzna373Hp0NIln8wFdsFn8r
SpU0F6/QxvDFNl4GXRyFbPhw++wkzne7Fp78VyPCIdQ432+G8LTINjNfhk7RuJ0yAzaGlBub1wi/3791

PXvWrxJtwb6h1dhTdUMwtWi9PrF+VI2ybMJMvc7t+mYj7ucfN45BTmjS7Sznq7HCzOFzQyJuAPKc8Q98

+zG17X0rPBUd4LonvGj70naBwG83fDfFwqpcN7yKMg
4oJ58/c5xO2d3+UJQhUOfhU9nCrMdwGcdT5i74+v/1lOx5iuiS/y4uQd2LrvVk8yM1rknlEZK7P4HBWD

XI/Iox3PPjvZTK/1D5SBH9/O5xIQAzzHw0ndWIv7+3Z6Wxb1TRq9dHreUxjKZKbC193EDbALSexHQf44

jYibIVtVeenk01SDGu9ICoixwmDHFaxwa14Ea/vmLW
1App6pgHn75NLLL8uy2ggqeuD2Ul23I3yis6anWneHDXrXsQGXXXiZpHRuw88kd7qK8Uklqv3VFmOrQj

kD+6vo8KuCAisn4tZ4SHB8DbVqOAa9gL5FgHsbF1170XQZ7Vwok/NjEzwJaZQQGadyQnEt86cBsDUKvh

swMmwqLd698lA1G796uJB/kt6ZRxJjnplEg0abkxEo
WyYUK59RCzCPUbdrcooGzLqZhbxoMKrKWodLMNBH5at9slPTXhMGtqbpMxNKFHo4KPR0lc1OfWwwtUcT

uS9E96keOv1omRv6bOqMMSv5GbZIo5SKScPQu6hNQFzHHIZvJefo+cJSXUnCy2B5/+BhKPU5l1+DNNoR

AonecWzqiDeTonNVKC0Pj9o4o/Md4ECyGRYsRMsP0c
7OzniAyk+VIiIiRA/4bFFwmzqitIVMvBABRafq6CfZ4wZVa7CgRFcMczj8HtT2Ah/4+/tQcL1g68inUc

58aTW4Aarfk/vhCssg6oEHqKGwLxb09GBKmThzigAUXMTR8Pxf1iKSv1nEoE0Gf2rdHUCU1AWoAn0FNX

l2cmARtVaFDKTmucu3CAom844pbtYUxIytzCOEsANQ
a7HDf5rP0awu7/EFjLCZ5sCD7n38+iV72KqOFqFznmUrIVisrltKK3cvUoEmuDzwj5jn2v4Dvu3RNaWA

Bqrqsbj8Z5NSjQ/m9XZjEBAhczvpDhcsWMQqEcIhi33JA4YiJbKpQTUJlT6OdkDf1NK+Kjw7zMXfAGYc

q+Bovd4sVgxc5xfEDqOikrDXplRmjdXsWNUskmWF/t
puUv8GZrb3b6kq7zHU0dGokhCSolWukyid81FrjEacqWpi8q3J1HiDM82Fn4zqfGd63SXpTeUUXXpSQP

z7OZ8Mz/wZ1GDJQ/s1A8iYkzHD7VnPC6cGm4mvgjUss82PKgEEEpW7ovVd7aJ4H+PpATo420aJLOTDAl

kosxWmyDJt3tNF/E967QPQy2VTFTjX3eRR2JTF2LJL
TNxivwFd7RZFP7Blwj+keIwdsv76T7mQx7YF0v8+1IBlOGb8wnInu3/70A+Kkows65WzsyZKTEnt0kEq

A41yH00Zbn6gABFyBzxJdkuZnQH6OqXNenWhTWxvsAWH9Svy+/f2Im+4imAKwSixHh0HT08dN1sbLLl8

EL15/KrRvCWq9X+cDKIKcOwonzJssjxX4qtiSKpUOT
sl1o31pD9UUQpI3MlNwN+tJ7hqJj19U6ayMa4gSPhIa/B25W0YxYut+3mWjjG975u6ysZNI4RdhgD4cY

MD5kpnRvyXYDxqMT6rtOcZ309C6d/WBGZ36HA99hCv3RlJzuY33FBWI9WMjp6YIL+Ey7Hm7ljmCtJQEH

WZkeoTuCumOk1kVkuKHdHv6eczwrWVesDjU969siKS
ecy8He6I3YC1x+4qB2452dfxkL+peh5cya19irMfXBKutKZBz1eUZI3c/vvbY6VDffu55WS/f/4cdzou

M9jnwjK685MagN6BJQ8//cdlFYlCxrwkZ71u4afB9rDOq1tnEOKS1kuicW0508sRgGF8lDoTfaOhLm7h

rlP2frNHFwJb3ePE9vk8h+vMJaeEeVjH5B8aLR61tm
97m4wZoQwWWRXdM9Cq1t90vCSJImwgc+9apPlw8czlEXZ5v7d8l2TM0LxryVLDw4YzkFG6ZFTu2sv0vE

BvxNXwVrbb8lWyMeW/MFtpAMp2VARX2DUdn8DS8LEHpVfLW9zsareojSQCb4U5E6h3S47U/oeGhiYnJ9

WhijNTqZbxDpPf2hYj1MdSs7jioowJZKz48jVQ/bn6
sUamPNEEzfJo0dguy2isb8QZKABMwNKCmNfzcfjkuwx26eNbj+yxlFvhqDySdwvSMOhYiXhwGnq3VYtj

y5RVfQFqm8TftiBrlB2BwJu79BCuUmwo9kHVcX/EB4XNpgZScm+rw8BRuaR/H5gHj5o9efJd0+njJ4cO

KWb7zJ57cH10m/8eug/ePqFFQWb/T7zAKRDgm9yLfL
DZCpWCqz0WOo2k7Mr7E+LG0D+NIPMpcATtC03MH0Ld/o3Zk/9/8Ytzssly6EznHCmqicjFSbaAHPZLjl

1ORlkHDow9LOv4aobZHXkY/twublkiFFu3enXkKfYY7S620Coo2qLlrChFFI3FwpNfcboAPsl/v0EvKs

pkvvPIxv754Fl5yuM12VeMEvDuTaWMGscg/OWgEQzO
od6ke+mGQD0h7LMFrGTOfCebhoUAU/0JWRua4kceQVAhuRueKU3+/pgbOcumwQIL/5DryhyG8q3/fuTl

9iWGJx6/W7lF8hJk79pPJ8EvnOuhSiyXYagy/ZzTqVsHFGbGU9FIPH2aXPNyVasWhWkiHxFROC4OzsFc

PY8p0IC0r1jvyFEI/mvxc3kU3gnHtMXhzqmK2dJPSV
/PvJqppKhU+GqTikWyYz485sUCv+JEphS3IdLPYzzgPA5s2Yo9yPTbL5vBdcdO/Nk7sqEwPLNEZfRxbX

xPsUSRGyN3INpjuBV/M1bjX3OiqHg1Jo1zE0iUNXlmkiQgl5LSHc6+6bCjuAFMqwuJ7wULSUp5o773jm

iU6DgmFHx1vKo1jd8XjiFMS/9h3Lh+/alt9aBqUT0z
m9JSU+m361zUTNJOHHXTu03f1G68LLhsgKKXZCUVfu/D/jSbdRcxTxOsVQP2pzGxiBgpwoXoWkcYRTZg

PPEfNfnXRVVgVeYOJA6xh8MqTaC7IRQdc6YfKvd8BV3WDT1apAfxWGijXKFAQT0IfXr1SZPjR9PuMHCd

YZQly8XqVE9+SqV7yhApeDm5iDYALLLauEV002/ox3
aerDO0nGLhZPHJn9MJ643D1htCOA4hKqMcxAFe33yw16VovNbvp6zACMlGYSFfkem02UynMtHPyR/CH6

F2YGb761hhEuaI0NgH4aceifEs5j8Ee/m+d8lysMLMhRNMxwVXbVfPvqjSJspQijuTWprrdX+rZYeI9W

FxaAvhky+eVXdJ6Cc8sC62hJ75blAuR+oQlyHu6y3N
jxrG2boxrt40lFWu5Dx4S+I+HfzS2jkdR7jsxn2Nfh2LPLLUp9N+/J030dJgUU3QIXAq/dksTe3iVMr8

W0pEKeLbUYQcABHsVGWSMjNc26uAd78DEV1ytHh3tvXE+4665mslcUAQz4K+vNt4BFp3gJp7MKl0cRdy

9xk7b54Ba2Mo1fZlpNosZM8UWt/Iu7jHp3XmN1U8S/
FMqU8W7uuZyyTO9xGWNRUH9f0dFyTvtDMQE7JwJgGEOWP01Ldzwl8TVDR2ojLZIVxNi8ey/No8E7H06d

7k3WGKUzdW3imk5lQeB2j4G47k7/L7Wx8iajc9ReJE39P+sagZT7gZ0BOWk73zgHYfB1JXBQv+tYMXEg

uXIJOy4swKZHpM1DIWbYQPzw+1HdGoGjh3RVuI0oa7
ajq+ztDJ3zUO5fP0o9B3jmOaW86o567/+NK/dxFm5q9sAV78sex0eA1BH4HOvLAGNnt2Yn5dJ7H2SkV+

tR3/CvM7pe+anqJnvJ4ZE/bGJjmWzJ4h4NFh630AQaJPWrkn+G1trTNFUZlqaxnalE1u0FMnJfXLKFmy

s5TMbwXciZ9GgCNXVK+UTADRHM4Oz/aU08q6FYfkNe
EuDz9AAo67L9spgv6Kfw+Leu0BmIJPmA6cNOv2tiCLQLZVHyRe2ycwVMqluWpYUkcsRZkFElCgPVTTir

jzGWg+axhdUDKqvLCWXqJH4mlAk2lpKhEAo4PoBTj3HhL1Klbis1QAiTZKypUapIzr2YaxYlxUEZjMTO

gJLSlESmfEyz21objd1CGLwHn7XvCuIL0CDbX7R1lq
0lK9N2iEB3ukFbvgfHoFDYjAasZ2WpzpkOo5AyIDOg73w/rx53Op5Z/yoT4QHpiuaoZuEXEHWlvzZ9U5

Rzm9BPXVa4ShuiDBgVHYmNXSaf3owdityjpS7fw6PaHdzcysyBBMEncf8NS5SVL+YfCYna7gnIzBHOCE

QnD8eTCs/4ApZl+bkpinEYnbzNjHNJQnpy1KOTKscC
rHWpvsOpKQ7xdW+vXGRDS9r9cx/IIKXuxJ3Oep6YFtvH3R3AiOomTjNGJIeCsKupbujI3JZhXX4qChJk

WUlD+qqK5J0QU3+9+mUPdrazQnUodYykd8mQGq6pTNu5DSt2WtS625fXJ4UxfXk6a+wZ4qAI30C2yXFa

51drrThQaVK0A3tMrUhV8vTuEM37m8ZQnc/9s+NMiB
+LgVvhoQ1cgP0uV8SCcbSd2/J2TVK03dBQUEHos1FLmekmbJA8zX2mHJ+LACWCF7fcnz0kb3vQ8Xvn5e

pZYc0XeBF4L5mpmuPFh1RgWAWe9ksIKFRPgONB9q5AcTrRE81klpqboxshhy3Pzs0dcFdOsiUs8IrtvT

dUWKseX98TyTWJmUkm54jYsudRjbgnbV+6KeepxjW9
hNwdN3kS80O/nghKxGa3yjuc/1+w29G/XPp960t/N+r0vf+8HWfTN5hQ01hdhYS6/Ic5phs79ujd40vk

6jOSN7LUuP9EPM/9aS5QPaNbdD0NG519Og2ocwOOWB00VwYnhmoPAe4k4D+fk58vOPJPvE6xn/ZDRBj6

6IR7SvXqtrhlfYzLnMfUy27rV+xa1ur7NrN5ZqibdV
N2K7o9kA24+Pz9EqN4ZMLkO9xeeBhrk2HjGHG39ETuCC9o/WT07QMEzY5fBcjU1jXZ8/inXM+43+J2jD

9MaZLIfH8uvf0A4EhqHztkTjwjBUNapd8WmlPaaKg8ic9BpVjcrKkRnwNINLj1aOKDOAwFNicGKIDkae

uoxT/W7xsoWTfLcsKJmfrDBAjz4dfXSL/bFW8oIqfA
xrGfVeIMwlBDYss8x06irjcio4q1CdWpnPJaEZX+ZXSpgRm8ta0I5JFkr3r7NpQm7C/tcMHFY00zmpKz

fotVomuUQ04yIlYhHjf26oZRmhWOSfXp+02GBmxbTaq02UZf8Rd8iaINmTmrQLz+pJHtoaElDx2pTLF4

43M16/kiL97Un9xnRMmVzpC89ccLDBXCDMqxtkYJf1
n+UoYBa+KIDKnlA0hlRlpc3khpBDEbWiKLZythrfgFHqrkB83uzv8knKaLSWs/Uxzz+LDebbYiW0Xthn

38zg+dNnuxcQVrALSjEwfBDQH4w0vd5vgqJ4zojicxPbXaBNE0EOL1gOo6H1o4GMrx8yav/LQvi0yvMk

Ovap7z/o8plUOwh4snpnF/dbsfi/cBx4eT2JZnpMX1
U5emzw7ID5sqraVqvDiOuX4MGgAXdHYzAsyzXUEDuZ8B9wHQcMB5JJcMapDqfthn2gnyjFk724/g8YZu

obThPrSCP1soTyrPdaxnZeXkvMOQpxRTUwIlqZVPc7XWsdcbKuUnkMHMFwLHToPdfGXKzLMYZrWrOre7

BaK9XkWVHdWYZJQfWdL0O6aUVsO3JdD3CXUVPuDVOt
eWYXm9kxWpBbXFV8EABaLihgUTwsXU7Mdb7yUz17SUpaARZyAlOqUCw8Wx9RTMGvExicKPTHb8geUqBj

LET2XPWkLbesHJygJL4RgegnAs13MJntGEZuBbLbFMu9Gl5IXMZdJWDzEW24HORyPWDYVoYlM8PqxbSc

rjVuUVE0UKSsOia+PgplbmRvYmoKMTggMCBvYmoKPD
bXZPDaBWquTXNfIf9xvIyhCW3QlTN0sQMyRN9ViANhAEazWA8IGZQxJv7vpXErREapJFEzAh5sYNkvUS

4VzoPsXTapCfVnT6hqJK9xjGMgY16cmU3tIj8+WyApYA1rboczQAEhGQ1nuzo3OJbhIT9FgVWcEE2Fm1

14YxYfR0R8IsL8lYDgQ6O2rVVdNjSzI4Bmn9LUg621
RL3WUWz6VNCuP1LjCd5zBMyoKE4QzpAxZHzjFrCuU3roMB3oaQYwZ66jiS0qLm8+XqRwIN0knepfKGXv

YT0eylk9KSafOU9IaSRaAO7Me460KgJqZ0A0HnG2hWAlZ8T2zTKqMaVpH7Mus6GNc496IM0FwJ3rer1G

j90ystmmLJ2AvmTkAEzcHeLmY4kpWP2riWCqS03zdR
5nCj4+UbXoNT8smmyaYPZgv7GeKcu4PT7NwOGkYT6AVVopcfrcV056wlQkTWgnL9qqziNmDUIyCMXMCR

0eQq6FSK4cv5XiDtLaVWLfVcfFLVvWRJBlNCabOPFfR2F8DItbUdqoPE2PPCsrzcLhVUYbXnfiBM4GUC

5hQRf1YMBsp22fb50wNmQqF64lxOHmJVSdBTnpQTEW
FvOlC9LkG2MTRUVgaKArVFSjHMLAWb8+NzJrNR7tuqgdDOAeq8FqYcg9AaHoU7KkgwXrVXrDYEIbYywp

LHstPGVKMrQkR8KkSRQ4FCYmFssaSE2DPEPvBDVfSFDLAf8SEUOpDMHrN8FojAYwT3UQOxBnOS9dM1GK

HX6OuXVwWEUkY6BweAGeAwSbR9yIZmqqI2SiLEcwP1
ljZUOsWfLtBKPeO3jdPxJwVYDkZKOkTk2FGVAsG46px1HNmOTdVXK5ZEWpRJUbJSNmeIHMGbF4OUNnVx

A+Pgo+ThrrfaQiQcmPLBXqVCLoFpfFYHdqD728kWMuSwFtPUksK8UiI2ZfUg1bNO5+CgMrGU4pwlt7nv

NvEmMyJmBQXNZdHPTsREJiYGN0FNKeXBWyOJcrHDAg
KQJkUAp9QZZlDQLnBE6jIoEbCOQrOlN2UBfuCLYzZYKpysIGEVLmROChEnT1JUQyCYNdCTYhIRfdXJXv

JHUxXJD7OFSdTKVzFJ6zCwIyMRCuPLIpNEUdZXShEJUptsSULZIhVASzBfciKuCgHVBuXNUdSAqvUDHf

PFTmJjLdOSBhFGUtKE0wYpErAZDgQUN5TDIoONIqED
YfakIILLXhVNJtBpuxBYThUNEfNOYhUZpxHWDrIFRjWdofDCRqYVQpEF6uVvSvFRPiLiGmYDWhQCDjXR

ZvohHJHAIpLBIgXqcgTAVvPBHkDXIuMLajVNIcRUKyWDzbOZGiQZOxKH7cTlMnLGLtVFG3UCqbYYNfED

HxygSBOQDrAXIxFIK2DSZfKZTjITEjLVtsTSKqVPCj
FfbhKDZkLSJcNC2uCpNeQJSnBdYqAOIzJQWdGZBawcRRTFRvHCRrQtH0KlMsRWRfDOSrOJsjLOMaRECe

NcbvEZSlYWDeCJ0tJmIdMAZrJcW7PDNuHZQlJCNehaCMcCTjuAzlive8QJljXS8Ky358WZWaZAMRDhWa

H6phRx8hUUOrFEGPRURsUUAeChp3EZRTUHUTTMEEID
H2I0XpAsUnMQH0IyUiBGW2EXKZXc3gZXaFBpXVKKZeACE2DRZGWrONOSGaYLdGNNJ7VODeGvCVJw1PBT

FlT1k5VSXnZTt+DldqxFFrqCuvQCZOTjWcUDeKQPBME6RI









                    ID                  Date                Data Source

 

                    [id number]     09/15/2020 02:30:00 PM EDT [facility]









          Name      Value     Range     Interpretation Code Description Data Josefina

rce(s) Supporting 

Document(s)

 

          T4 FREE   1.10 ng/dL 0.76 - 1.46 Below low normal           [facility]  

 

                                                 **Reference range updated for Counts include 234 beds at the [facility] LOCI technology based           

chemiluminescent immunoassay method, and age specific                        
ranges.Effective 18. 









                    ID                  Date                Data Source

 

                    [id number]     09/15/2020 02:30:00 PM EDT [facility]









          Name      Value     Range     Interpretation Code Description Data Ojsefina

rce(s) Supporting 

Document(s)

 

          TSH ULTRASENSITIVE                                         [facility]  

 

                                        ********************************CORRECTE

D REPORT********************************

 

 

          TSH       2.24 uIU/mL 0.36 - 3.74 Below low normal           [facility]  

 

                                                 ***Reference range updated for 

new LOCI technology based               

chemiluminescent immunoassay method, and age                   specific ranges. 
Effective 18.======================FOLLOWING RESULTS REPORTED IN 
ERROR======================= TS]   TSH                   

 

          REASON    SAMPLING ERROR                               [facility]

ester  

 

          NOTIFIED  GINO WILLSON 9/15/20 @1800                               [facility]  

 

          ORIGINAL REPORT 9/15/20 @ 1723                               [facility]  

 

          CORRECT DATE/TIME 9/15/20 @ 1803                               [facility]  









                    ID                  Date                Data Source

 

                    [id number]     09/15/2020 02:30:00 PM EDT [facility]









          Name      Value     Range     Interpretation Code Description Data Josefina

rce(s) Supporting 

Document(s)

 

          COMPREHENSIVE CHEM PROFILE                                         Cli

[facility]  

 

                                            COMPREHENSIVE METABOLIC PANEL 

 

           Sodium [Moles/volume] in Serum or Plasma 139 mEq/L  136 - 145        

                [facility]                                 

 

           Potassium [Moles/volume] in Serum or Plasma 4.8 mEq/L  3.5 - 5.1     

                   [facility]                                

 

           Chloride [Moles/volume] in Serum or Plasma 103 mEq/L  98 - 107       

                  [facility]                                 

 

                Carbon dioxide, total [Moles/volume] in Serum or Plasma 30.3 mEq

/L      21.0 - 32.0      

                                        [facility]  

 

           Glucose [Mass/volume] in Serum or Plasma 117 mg/dL  70 - 100   Above 

high normal            

[facility]                    

 

           Urea nitrogen [Mass/volume] in Serum or Plasma 18 mg/dL   7 - 18     

                      [facility]                                 

 

          CREATININE SERUM 1.24 mg/dL 0.70 - 1.30                     [facility]  

 

          AGE       50 yrs                                  [facility]

l  

 

          HEIGHT    72                                      [facility]

l  

 

          eGFR NON-AFR AMR >60                                     [facility]  

 

          eGFR AFR AMR >60                                     [facility]

ital  

 

          BUN/CREAT 15        6 - 25                        [facility]  

 

           Protein [Mass/volume] in Serum or Plasma 6.4 g/dL   6.0 - 8.3        

                [facility]                                 

 

           Albumin [Mass/volume] in Serum or Plasma 3.7 g/dL   3.8 - 5.4  Below 

low normal            

[facility]                    

 

          GLOBULIN  2.7 g/dL  2.0 - 4.0                     [facility]  

 

          A/G RATIO 1.4       0.8 - 2.0                     [facility]  

 

             Calcium [Mass/volume] in Serum or Plasma 8.2 mg/dL    8.8 - 10.2   

Below low normal  

                          [facility]      

 

           Bilirubin.total [Mass/volume] in Serum or Plasma 0.5 mg/dL  0.2 - 1.0

                        [facility]                           

 

           Bilirubin.direct [Mass/volume] in Serum or Plasma 0.1 mg/dL  0.0 - 0.

2                        

[facility]                    

 

          INDIRECT BILI 0.4 mg/dL 0.0 - 1.1                     [facility]

pital  

 

          ALK PHOSPHATASE 66 U/L    40 - 129                      [facility]

ospital  

 

                                        Aspartate aminotransferase [Enzymatic ac

tivity/volume] in Serum or Plasma by 

With P-5'-P 22 IU/L    7 - 37                           [facility]  

 

                                        Alanine aminotransferase [Enzymatic acti

vity/volume] in Serum or Plasma by With 

P-5'-P     16 IU/L    12 - 78                          [facility]  

 

          ANION GAP 6         7 - 15    Below low normal           [facility]  

 

                                                          Estimated GFR referenc

e range: >60ml/min/1.73m               

>18 years: Calculated using [employer] traceable Study Equation           <18 years: 
Calculated using [facility] tracable Bedside Schartz Equation 









                    ID                  Date                Data Source

 

                    [id number]     09/15/2020 02:30:00 PM EDT [facility]









          Name      Value     Range     Interpretation Code Description Data Josefina

rce(s) Supporting 

Document(s)

 

                Troponin I.cardiac [Mass/volume] in Serum or Plasma <0.017 ng/mL

    0.017 - 0.060    

                                        [facility]  

 

                                                              \BLDo\TROPONIN I I

NTERPRETATION:\BLDx\     < 0.06  ng/mL  

      NOT SUSPICIOUS FOR AN AMI     0.06 - 0.59 ng/mL    GRAY ZONE FOR AN AMI, 
SERIAL MONITORING RECOMMENDED     0.6 - 1.5 ng/mL      SUSPICIOUS FOR AN AMI    
      Reference range updated for new chemiluminescent immunoassay          
method based on [employer] technology. Effective 18. 









                    ID                  Date                Data Source

 

                    [id number]     09/15/2020 02:30:00 PM EDT [facility]









          Name      Value     Range     Interpretation Code Description Data Josefina

rce(s) Supporting 

Document(s)

 

          CBC                                               [facility]

l  

 

                                            COMPLETE BLOOD COUNT 

 

           Leukocytes [#/volume] in Blood by Automated count 5.3 K/uL   4.0 - 10

.0                       

[facility]                    

 

           Erythrocytes [#/volume] in Blood by Automated count 4.43 M/uL  4.30 -

 6.10                       

[facility]                    

 

           Hemoglobin [Mass/volume] in Blood 13.2 g/dL  13.5 - 17.5 Below low no

rmal            

[facility]                    

 

           Hematocrit [Volume Fraction] of Blood by Automated count 41.2 %     3

9.0 - 50.0                       

[facility]                    

 

                    Erythrocyte mean corpuscular volume [Entitic volume] by Auto

mated count 93.0 fL             

80.0 - 96.0                                     [facility]  

 

                          Erythrocyte mean corpuscular hemoglobin [Entitic mass]

 by Automated count 29.8 

pg           26.0 - 34.0                            [facility]  

 

                                        Erythrocyte mean corpuscular hemoglobin 

concentration [Mass/volume] by Automated

 count     32.0 g/dL  32.0 - 36.0                       [facility]  

 

             Erythrocyte distribution width [Ratio] by Automated count 13.5 %   

    11.6 - 14.8                

                          [facility]      

 

           Platelets [#/volume] in Blood by Automated count 262 K/uL   150 - 450

                        [facility]                            

 

                    Platelet mean volume [Entitic volume] in Blood by Automated 

count 9.1 fL              7.1 - 

10.4                                            [facility]  

 

           Neutrophils [#/volume] in Blood by Automated count 3.55 K/uL  1.70 - 

7.70                       

[facility]                    

 

           Lymphocytes [#/volume] in Blood by Automated count 1.52 K/uL  1.50 - 

6.00                       

[facility]                    

 

           Monocytes [#/volume] in Blood by Automated count 0.16 K/uL  0.00 - 1.

00                       

[facility]                    

 

           Eosinophils [#/volume] in Blood by Automated count 0.02 K/uL  0.00 - 

0.30                       

[facility]                    

 

           Basophils [#/volume] in Blood by Automated count 0.02 K/uL  0.00 - 0.

10                       

[facility]                    

 

                                        0.02 

 

           Urinalysis macro (dipstick) panel - Urine 0.000 10^3/uL 0.000 - 0.012

                       

[facility]                    

 

           Neutrophils/100 leukocytes in Blood by Automated count 67.1 %     42.

2 - 75.2                       

[facility]                    

 

           Lymphocytes/100 leukocytes in Blood by Automated count 28.7 %     15.

0 - 41.0                       

[facility]                    

 

           Monocytes/100 leukocytes in Blood by Automated count 3.0 %      0.0 -

 12.0                       

[facility]                    

 

           Eosinophils/100 leukocytes in Blood by Automated count 0.4 %      0.0

 - 7.0                        

[facility]                    

 

                                        0.40.40 

 

          NRBC      0.0 %                                   [facility]

l  

 

          MANUAL DIFF NOT INDICATED                               [facility]

ospital  

 

          RBC MORPH NOT INDICATED                               [facility]

pital  







                                        Procedure

 

                                          



                                                                                
                                                                                
                                                                                
                                                                                
                                                                                
                                                                                
                                                                                
                                                                                
                  



Social History

          



           Code       Duration   Value      Status     Description Data Source(s

)

 

                    Smoking             2021 12:00:00 AM EDT - 1994 

12:00:00 AM EST Patient is a 

former smoker       completed           Patient is a former smoker JANETH ([facility]

zarina [facility], )



                                                                                
                  



Vital Signs

          



                    ID                  Date                Data Source

 

                    UNK                                      









           Name       Value      Range      Interpretation Code Description Data

 Source(s)

 

           Body mass index (BMI) [Ratio] 48.02 kg/m2                       48.02

 kg/m2 [facility]

 

           Systolic blood pressure 141 mm[Hg]                       141 mm[Hg] [facility]

 

           Diastolic blood pressure 70 mm[Hg]                        70 mm[Hg]  

[facility]

 

           Body surface area Derived from formula 2.98 m2                       

   2.98 m2    [facility]

 

           Body height 187.9600 cm                       187.9600 cm [facility]

 

           Oxygen saturation in Arterial blood by Pulse oximetry 99 %           

                  99 %       [facility]

 

           Heart rate 84.0 /min                        84.0 /min  [facility]

ospital

 

           Respiratory rate 20 /min                          20 /min    [facility]

 

           Body temperature 36.4 Loulou                         36.4 Loulou   [facility]

 

           Body weight 169.64 kg                        169.64 kg  [facility]

 

           Systolic blood pressure 108 mm[Hg]                       108 mm[Hg] [facility]

[facility]

 

           Diastolic blood pressure 60 mm[Hg]                        60 mm[Hg]  

[facility] ([facility])

 

           Heart rate 69 /min                          69 /min    [facility] ([facility])

 

           Oxygen saturation in Arterial blood by Pulse oximetry 95 %           

                  95 %       [facility] 

([facility])

 

           Body height 71 [in_i]                        71 [in_i]  [facility] ([facility])

 

                                        5'11" 

 

           Body weight 381.00 [lb_av]                       [facility]00 [facility] ([facility]

 

           Body mass index (BMI) [Ratio] 53.1 kg/m2                       53.1 k

g/m2 [facility] ([facility])

 

           Ideal body weight 172 [lb_av]                       172 [lb_av] [facility]

[facility] ([facility])

 

           Body weight 172.822 kg                       172.822 kg [facility] ([facility])

 

           Body surface area Derived from formula 2.77 m2                       

   2.77 m2    [facility] ([facility])

 

           Systolic blood pressure 150 mm[Hg]                       150 mm[Hg] [facility]

 

           Diastolic blood pressure 81 mm[Hg]                        81 mm[Hg]  

[facility]

 

           Oxygen saturation in Arterial blood by Pulse oximetry 96 %           

                  96 %       [facility]

 

           Heart rate 88.0 /min                        88.0 /min  [facility]

ospital

 

           Respiratory rate 28 /min                          28 /min    [facility]

 

           Body temperature 37.1 Loulou                         37.1 Loulou   [facility]

 

           Systolic blood pressure 150 mm[Hg]                       150 mm[Hg] [facility]

 

           Diastolic blood pressure 87 mm[Hg]                        87 mm[Hg]  

[facility]

 

           Oxygen saturation in Arterial blood by Pulse oximetry 96 %           

                  96 %       [facility]

 

           Heart rate 88.0 /min                        88.0 /min  [facility]

ospital

 

           Respiratory rate 22 /min                          22 /min    [facility]

 

           Body temperature 37.1 Loulou                         37.1 Loulou   [facility]

 

           Body mass index (BMI) [Ratio] 54.38 kg/m2                       54.38

 kg/m2 [facility]

 

           Systolic blood pressure 126 mm[Hg]                       126 mm[Hg] [facility]

 

           Diastolic blood pressure 69 mm[Hg]                        69 mm[Hg]  

[facility]

 

           Body surface area Derived from formula 2.91 m2                       

   2.91 m2    [facility]

 

           Body height 177.8000 cm                       177.8000 cm [facility]

 

           Oxygen saturation in Arterial blood by Pulse oximetry 97 %           

                  97 %       [facility]

 

           Heart rate 68.0 /min                        68.0 /min  [facility]

ospital

 

           Respiratory rate 20 /min                          20 /min    [facility]

 

           Body temperature 36.9 Loulou                         36.9 Loulou   [facility]

 

           Body weight 171.91 kg                        171.91 kg  [facility]

 

           Body height 182.8800 cm                       182.8800 cm [facility]

 

           Body mass index (BMI) [Ratio] 50.59 kg/m2                       50.59

 kg/m2 [facility]

 

           Systolic blood pressure 116 mm[Hg]                       116 mm[Hg] [facility]

 

           Diastolic blood pressure 63 mm[Hg]                        63 mm[Hg]  

[facility]

 

           Body temperature 37.0 Loulou                         37.0 Loulou   [facility]

 

           Body surface area Derived from formula 2.93 m2                       

   2.93 m2    [facility]

 

           Oxygen saturation in Arterial blood by Pulse oximetry 97 %           

                  97 %       [facility]

 

           Respiratory rate 16 /min                          16 /min    [facility]

 

           Body weight 169.19 kg                        169.19 kg  [facility]

 

           Oxygen saturation in Arterial blood by Pulse oximetry 97 %           

                  97 %       JANETH 

([facility], )

 

           Body height 71 [in_i]                        71 [in_i]  JANETH ([facility], )

 

                                        5'11" 

 

           Body weight 378.00 [lb_av]                       378.00 [lb_av] [facility]

T ([facility])

 

           Body mass index (BMI) [Ratio] 52.7 kg/m2                       52.7 k

g/m2 [facility] ([facility])

 

           Ideal body weight 172 [lb_av]                       172 [lb_av] [facility]

T ([facility])

 

           Body weight 171.461 kg                       171.461 kg [facility] ([facility])

 

           Body surface area Derived from formula 2.76 m2                       

   2.76 m2    [facility] ([facility])

 

           Heart rate 60 /min                          60 /min    [facility] ([facility])

 

           Oxygen saturation in Arterial blood by Pulse oximetry 97 %           

                  97 %       [facility] 

([facility])

 

           Body height 71 [in_i]                        71 [in_i]  [facility] ([facility])

 

                                        5'11" 

 

           Body weight 378.00 [lb_av]                       378.00 [lb_av] [facility]

T ([facility])

 

           Body mass index (BMI) [Ratio] 52.7 kg/m2                       52.7 k

g/m2 [facility] ([facility])

 

           Ideal body weight 172 [lb_av]                       172 [lb_av] [facility]

T ([facility])

 

           Body weight 171.461 kg                       171.461 kg [facility] ([facility])

 

           Body surface area Derived from formula 2.76 m2                       

   2.76 m2    [facility] ([facility])

 

           Oxygen saturation in Arterial blood by Pulse oximetry 95 %           

                  95 %       [facility] 

([facility])

 

           Heart rate 67 /min                          67 /min    [facility] ([facility])

 

           Body temperature 97.2 [degF]                       97.2 [degF] [facility]

 ([facility])

 

           Body mass index (BMI) [Ratio] 52.2 kg/m2                       52.2 k

g/m2 [facility] ([facility])

 

           Body height 71 [in_i]                        71 [in_i]  [facility] ([facility])

 

                                        5'11" 

 

           Body weight 169.646 [facility]                       169.646 [facility]

 

           Body surface area Derived from formula 2.75 m2                       

   2.75 m2    [facility] ([facility])

 

           Body weight 374.00 [lb_av]                       374.00 [lb_av] [facility]

T ([facility])

 

           Systolic blood pressure 110 mm[Hg]                       110 mm[Hg] M

[facility] ([facility])

 

           Ideal body weight 172 [lb_av]                       172 [lb_av] [facility]

T ([facility])

 

           Diastolic blood pressure 70 mm[Hg]                        70 mm[Hg]  

[facility] ([facility])

 

           Body mass index (BMI) [Ratio] 52.2 kg/m2                       52.2 k

g/m2 [facility] ([facility])

 

           Body surface area Derived from formula 2.75 m2                       

   2.75 m2    [facility] ([facility])

 

           Body height 71 [in_i]                        71 [in_i]  [facility] ([facility])

 

                                        5'11" 

 

           Oxygen saturation in Arterial blood by Pulse oximetry 95 %           

                  95 %       [facility] 

([facility])

 

           Body temperature 97.2 [degF]                       97.2 [degF] [facility]

 ([facility])

 

           Body weight 374.00 [lb_av]                       374.00 [lb_av] [facility]

T ([facility])

 

           Ideal body weight 172 [lb_av]                       172 [lb_av] [facility]

[facility] ([facility])

 

           Body weight 169.646 kg                       169.646 kg [facility] ([facility])

 

           Systolic blood pressure 130 mm[Hg]                       130 mm[Hg] [facility]

 

           Body mass index (BMI) [Ratio] 50.18 kg/m2                       50.18

 kg/m2 [facility]

 

           Diastolic blood pressure 66 mm[Hg]                        66 mm[Hg]  

[facility]

 

           Body surface area Derived from formula 2.92 m2                       

   2.92 m2    [facility]

 

           Body height 182.8800 cm                       182.8800 cm [facility]

 

           Oxygen saturation in Arterial blood by Pulse oximetry 97 %           

                  97 %       [facility]

 

           Heart rate 56.0 /min                        56.0 /min  [facility]

ospital

 

           Respiratory rate 20 /min                          20 /min    [facility]

 

           Body temperature 36.3 Loulou                         36.3 Loulou   [facility]

 

           Body weight 167.83 kg                        167.83 kg  [facility]

 

           Body mass index (BMI) [Ratio] 50.45 kg/m2                       50.45

 kg/m2 [facility]

 

           Systolic blood pressure 131 mm[Hg]                       131 mm[Hg] [facility]

 

           Diastolic blood pressure 68 mm[Hg]                        68 mm[Hg]  

[facility]

 

           Body surface area Derived from formula 2.93 m2                       

   2.93 m2    [facility]

 

           Body height 182.8800 cm                       182.8800 cm [facility]

 

           Oxygen saturation in Arterial blood by Pulse oximetry 98 %           

                  98 %       [facility]

 

           Heart rate 81.0 /min                        81.0 /min  [facility]

ospital

 

           Respiratory rate 20 /min                          20 /min    [facility]

 

           Body temperature 36.6 Loulou                         36.6 Loulou   [facility]

 

           Body weight 168.74 kg                        168.74 kg  [facility]

 

           Body mass index (BMI) [Ratio] 48.82 kg/m2                       48.82

 kg/m2 [facility]

 

           Systolic blood pressure 122 mm[Hg]                       122 mm[Hg] [facility]

 

           Diastolic blood pressure 79 mm[Hg]                        79 mm[Hg]  

[facility]

 

           Body surface area Derived from formula 2.88 m2                       

   2.88 m2    [facility]

 

           Body height 182.8800 cm                       182.8800 cm [facility]

 

           Oxygen saturation in Arterial blood by Pulse oximetry 97 %           

                  97 %       [facility]

 

           Heart rate 70.0 /min                        70.0 /min  [facility]

ospital

 

           Respiratory rate 14 /min                          14 /min    [facility]

 

           Body temperature 37.1 Loulou                         37.1 Loulou   [facility]

 

           Body weight 163.29 kg                        163.29 kg  [facility]

 

           Oxygen saturation in Arterial blood by Pulse oximetry 96 %           

                  96 %       [facility] 

([facility], )

 

           Body temperature 97.4 [degF]                       97.4 [degF] [facility]

 ([facility],

 )

 

           Body mass index (BMI) [Ratio] 51.2 kg/m2                       51.2 k

g[facility]

 

           Body height 71 [in_i]                        71 [in_i]  [facility]

 

                                        5'11" 

 

           Syst[facility] 112 mm[facility]Hg[facility]                       [facility]

 

           Diastolic blood pressure 70 mm[Hg]                        70 mm[Hg]  

[facility] ([facility])

 

           Body weight 367.00 [lb_av]                       367.00 [lb_av] [facility]

T ([facility])

 

           Ideal body weight 172 [lb_av]                       172 [lb_av] [facility]

T ([facility])

 

           Body weight 166.471 kg                       166.471 kg [facility] ([facility])

 

           Body surface area Derived from formula 2.73 m2                       

   2.73 m2    [facility] ([facility])

 

           Heart rate 66 /min                          66 /min    [facility] ([facility])

 

           Diastolic blood pressure 70 mm[Hg]                        70 mm[Hg]  

[facility] ([facility])

 

           Body height 71 [in_i]                        71 [in_i]  [facility] ([facility])

 

                                        5'11" 

 

           Ideal body weight 172 [lb_av]                       172 [lb_av] [facility]

T ([facility])

 

           Systolic blood pressure 110 mm[Hg]                       110 mm[Hg] M

[facility]

 

           Heart rate 65 /min                          65 /min    [facility] ([facility])

 

           Oxygen saturation in Arterial blood by Pulse oximetry 98 %           

                  98 %       [facility] 

([facility])

 

           Body weight 367.00 [lb_av]                       367.00 [lb_av] [facility]

T ([facility])

 

           Body mass index (BMI) [Ratio] 51.2 kg/m2                       51.2 k

g/m2 [facility] ([facility])

 

           Body weight 166.471 kg                       166.471 kg [facility] ([facility])

 

           Body surface area Derived from formula 2.73 m2                       

   2.73 m2    [facility] ([facility])

 

           Diastolic blood pressure 82 mm[Hg]                        82 mm[Hg]  

[facility] ([facility])

 

           Oxygen saturation in Arterial blood by Pulse oximetry 98 %           

                  98 %       [facility] 

([facility])

 

           Heart rate 63 /min                          63 /min    [facility] ([facility])

 

           Body temperature 96.9 [degF]                       96.9 [degF] [facility]

 ([facility])

 

           Body height 71 [in_i]                        71 [in_i]  [facility] ([facility])

 

                                        5'11" 

 

           Body weight 360.00 [lb_av]                       360.00 [lb_av] MEDEN

T ([facility])

 

           Body mass index (BMI) [Ratio] 50.2 kg/m2                       50.2 k

g/m2 [facility] ([facility])

 

           Systolic blood pressure 110 mm[Hg]                       110 mm[Hg] [facility] ([facility])

 

           Ideal body weight 172 [lb_av]                       172 [lb_av] MEDEN

T ([facility])

 

           Body weight 163.296 kg                       163.296 kg [facility] ([facility])

 

           Body surface area Derived from formula 2.71 m2                       

   2.71 m2    [facility] ([facility])

 

           Systolic blood pressure 112 mm[Hg]                       112 mm[Hg] [facility] ([facility])

 

           Body height 71 [in_i]                        71 [in_i]  [facility] ([facility])

 

                                        5'11" 

 

           Diastolic blood pressure 82 mm[Hg]                        82 mm[Hg]  

[facility] ([facility])

 

           Body weight 365.00 [lb_av]                       365.00 [lb_av] MEDEN

T ([facility])

 

           Heart rate 65 /min                          65 /min    [facility] ([facility])

 

           Body mass index (BMI) [Ratio] 50.9 kg/m2                       50.9 k

g/m2 [facility] ([facility])

 

           Ideal body weight 172 [lb_av]                       172 [lb_av] MEDEN

T ([facility])

 

           Body weight 165.564 kg                       165.564 kg [facility] ([facility])

 

           Oxygen saturation in Arterial blood by Pulse oximetry 98 %           

                  98 %       [facility] 

([facility])

 

           Body surface area Derived from formula 2.72 m2                       

   2.72 m2    [facility] ([facility])

 

           Systolic blood pressure 122 mmHg                         122 mmHg   [facility]

 

           Diastolic blood pressure 72 mmHg                          72 mmHg    

[facility]

 

           Oxygen saturation in Arterial blood by Pulse oximetry 100 %          

                  100 %      [facility]

 

           Heart rate 49.0 /min                        49.0 /min  [facility]

ospital

 

           Respiratory rate 18 /min                          18 /min    [facility]

 

           Body temperature 36.3 Loulou                         36.3 Loulou   [facility]

 

           Body weight 163.29 kg                        163.29 kg  [facility]

 

           Body mass index (BMI) [Ratio] 51.65 kg/m2                       51.65

 kg/m2 [facility]

 

           Body surface area Derived from formula 2.84 m2                       

   2.84 m2    [facility]

 

           Body height 177.8000 cm                       177.8000 cm [facility]



                                                                                
                  



Patient Treatment Plan of Care

          



             Planned Activity Planned Date Details      Description  Data Source

(s)

 

                benzonatate 100 MG Oral Capsule [Tessalon Perles] 10/21/2021 12:

00:00 AM EDT                  

                                        [facility]

 

             Medrol Dosepak 4MG Oral Tablet 10/21/2021 12:00:00 AM EDT          

                 [facility]

 

             Prednisone 20 MG Oral Tablet 10/16/2021 12:00:00 AM EDT            

               [facility]

 

             Prednisone 20 MG Oral Tablet 10/14/2021 12:00:00 AM EDT            

               [facility]

 

             Calcium 600 600 MG Oral Tablet 10/06/2021 12:00:00 AM EDT          

                 [facility]

 

             Cholecalciferol 1000 UNT Oral Tablet 2021 12:00:00 AM EDT    

                       [facility]

 

             Cholecalciferol 1000 UNT Oral Tablet 2021 12:00:00 AM EDT    

                       [facility]

 

             Prednisone 50 MG Oral Tablet 2021 12:00:00 AM EDT            

               [facility]

 

             Ergocalciferol 46422 UNT Oral Capsule 2021 12:00:00 AM EDT   

                        [facility]

 

             Ergocalciferol 94638 UNT Oral Capsule 2021 12:00:00 AM EDT   

                        [facility]

 

             Ergocalciferol 45146 UNT Oral Capsule 2021 12:00:00 AM EDT   

                        [facility]

 

             Ergocalciferol 20988 UNT Oral Capsule 2021 12:00:00 AM EDT   

                        [facility]

 

             Prednisone 10 MG Oral Tablet 2021 12:00:00 AM EDT            

               [facility]

 

                          Theophylline 200MG Oral Tablet, Extended Release, 12 H

R 2021 12:00:00 AM 

EDT                                                         [facility]

l

 

             Prednisone 10 MG Oral Tablet 2021 12:00:00 AM EDT            

               [facility]

 

                          Theophylline 200MG Oral Tablet, Extended Release, 12 H

R 2021 12:00:00 AM 

EDT                                                         [facility]

l

 

             Azithromycin 250MG Oral Tablet 2021 12:00:00 AM EDT          

                 [facility]

 

             Prednisone 10 MG Oral Tablet 2021 12:00:00 AM EDT            

               [facility]

 

                          Theophylline 200MG Oral Tablet, Extended Release, 12 H

R 2021 12:00:00 AM 

EDT                                                         [facility]

l

 

             Azithromycin 250MG Oral Tablet 2021 12:00:00 AM EDT          

                 [facility]

 

             Prednisone 10 MG Oral Tablet 2021 12:00:00 AM EDT            

               [facility]

 

                          Theophylline 200MG Oral Tablet, Extended Release, 12 H

R 2021 12:00:00 AM 

EDT                                                         [facility]

 

             Azithromycin 250MG Oral Tablet 2021 12:00:00 AM EDT          

                 [facility]

 

             Metoclopramide 10 MG Oral Tablet [Reglan] 2021 12:00:00 AM ED

[facility]

 

             Sumatriptan 50 MG Oral Tablet [Imitrex] 2021 12:00:00 AM EDT 

                          [facility]

 

             Metoclopramide 10 MG Oral Tablet [Reglan] 2021 12:00:00 AM ED

T                           [facility]

 

             Sumatriptan 50 MG Oral Tablet [Imitrex] 2021 12:00:00 AM EDT 

                          [facility]

 

             Metoclopramide 10 MG Oral Tablet [Reglan] 2021 12:00:00 AM ED

[facility]

 

             Sumatriptan 50 MG Oral Tablet [Imitrex] 2021 12:00:00 AM EDT 

                          [facility]

 

             Metoclopramide 10 MG Oral Tablet [Reglan] 2021 12:00:00 AM ED

[facility]

 

             Sumatriptan 50 MG Oral Tablet [Imitrex] 2021 12:00:00 AM EDT 

                          [facility]

 

             Metoclopramide 10 MG Oral Tablet [Reglan] 2021 12:00:00 AM ED

T                           [facility]

 

             Sumatriptan 50 MG Oral Tablet [Imitrex] 2021 12:00:00 AM EDT 

                          [facility]

 

             Prednisone 20 MG Oral Tablet 2021 12:00:00 AM EDT            

               [facility]

 

             Ergocalciferol 41300 UNT Oral Capsule 2021 12:00:00 AM EDT   

                        [facility]

 

             montelukast 10 MG Oral Tablet 2021 12:00:00 AM EDT           

                [facility]

 

             Omeprazole 20 MG Delayed Release Oral Capsule 2021 12:00:00 A

M EDT                           

[facility]

 

             Flonase 0.05MG/Actuation Nasal Spray 2021 12:00:00 AM EDT    

                       [facility]

 

             Meclizine Hydrochloride 25 MG Oral Tablet 2021 12:00:00 AM ED

T                           [facility]

 

             Albuterol 0.83 MG/ML Inhalant Solution 2021 12:00:00 AM EDT  

                         [facility]

 

             Levothyroxine Sodium 0.075 MG Oral Tablet 2021 12:00:00 AM ED

T                           [facility]

 

             montelukast 10 MG Oral Tablet 2021 12:00:00 AM EDT           

                [facility]

 

             Omeprazole 20 MG Delayed Release Oral Capsule 2021 12:00:00 A

M EDT                           

[facility]

 

             Flonase 0.05MG/Actuation Nasal Spray 2021 12:00:00 AM EDT    

                       [facility]

 

             Meclizine Hydrochloride 25 MG Oral Tablet 2021 12:00:00 AM ED

T                           [facility]

 

             Albuterol 0.83 MG/ML Inhalant Solution 2021 12:00:00 AM EDT  

                         [facility]

 

             Levothyroxine Sodium 0.075 MG Oral Tablet 2021 12:00:00 AM ED

T                           [facility]

 

             montelukast 10 MG Oral Tablet 2021 12:00:00 AM EDT           

                [facility]

 

             Omeprazole 20 MG Delayed Release Oral Capsule 2021 12:00:00 A

M EDT                           

[facility]

 

             Flonase 0.05MG/Actuation Nasal Spray 2021 12:00:00 AM EDT    

                       [facility]

 

             Meclizine Hydrochloride 25 MG Oral Tablet 2021 12:00:00 AM ED

T                           [facility]

 

             Albuterol 0.83 MG/ML Inhalant Solution 2021 12:00:00 AM EDT  

                         [facility]

 

             Levothyroxine Sodium 0.075 MG Oral Tablet 2021 12:00:00 AM ED

T                           [facility]

 

             montelukast 10 MG Oral Tablet 2021 12:00:00 AM EDT           

                [facility]

 

             Omeprazole 20 MG Delayed Release Oral Capsule 2021 12:00:00 A

M EDT                           

[facility]

 

             Flonase 0.05MG/Actuation Nasal Spray 2021 12:00:00 AM EDT    

                       [facility]

 

             Meclizine Hydrochloride 25 MG Oral Tablet 2021 12:00:00 AM ED

T                           [facility]

 

             Albuterol 0.83 MG/ML Inhalant Solution 2021 12:00:00 AM EDT  

                         [facility]

 

             Levothyroxine Sodium 0.075 MG Oral Tablet 2021 12:00:00 AM ED

T                           [facility]

 

             montelukast 10 MG Oral Tablet 2021 12:00:00 AM EDT           

                [facility]

 

             Omeprazole 20 MG Delayed Release Oral Capsule 2021 12:00:00 A

M EDT                           

[facility]

 

             Flonase 0.05MG/Actuation Nasal Spray 2021 12:00:00 AM EDT    

                       [facility]

 

             Meclizine Hydrochloride 25 MG Oral Tablet 2021 12:00:00 AM ED

T                           [facility]

 

             Albuterol 0.83 MG/ML Inhalant Solution 2021 12:00:00 AM EDT  

                         [facility]

 

             Levothyroxine Sodium 0.075 MG Oral Tablet 2021 12:00:00 AM ED

T                           [facility]

 

             montelukast 10 MG Oral Tablet 2021 12:00:00 AM EDT           

                [facility]

 

             Omeprazole 20 MG Delayed Release Oral Capsule 2021 12:00:00 A

M EDT                           

[facility]

 

             Flonase 0.05MG/Actuation Nasal Spray 2021 12:00:00 AM EDT    

                       [facility]

 

             Meclizine Hydrochloride 25 MG Oral Tablet 2021 12:00:00 AM ED

T                           [facility]

 

             Albuterol 0.83 MG/ML Inhalant Solution 2021 12:00:00 AM EDT  

                         [facility]

 

             Levothyroxine Sodium 0.075 MG Oral Tablet 2021 12:00:00 AM ED

T                           [facility]

 

                benzonatate 100 MG Oral Capsule [Tessalon Perles] 2021 12:

00:00 AM EDT                  

                                        [facility]

 

                benzonatate 100 MG Oral Capsule [Tessalon Perles] 2021 12:

00:00 AM EDT                  

                                        [facility]

 

             Prednisone 20 MG Oral Tablet 2021 12:00:00 AM EDT            

               [facility]

 

             Prednisone 20 MG Oral Tablet 2021 12:00:00 AM EDT            

               [facility]

## 2021-11-11 NOTE — REPVR
PROCEDURE INFORMATION: 

Exam: CTA Chest With Contrast 

Exam date and time: 11/11/2021 7:11 PM 

Age: 51 years old 

Clinical indication: Shortness of breath; Additional info: SOB 



TECHNIQUE: 

Imaging protocol: Computed tomographic angiography of the chest with contrast. 

3D rendering (Not supervised by radiologist): MIP and/or 3D reconstructed 

images were created by the technologist. 

Radiation optimization: All CT scans at this facility use at least one of these 

dose optimization techniques: automated exposure control; mA and/or kV 

adjustment per patient size (includes targeted exams where dose is matched to 

clinical indication); or iterative reconstruction. 

Contrast material: ISOVUE 370; Contrast volume: 75 ml; Contrast route: 

INTRAVENOUS (IV);  



COMPARISON: 

CT ANGIO CHEST 3/12/2017 6:39 PM 



FINDINGS: 

Pulmonary arteries: Normal. No pulmonary emboli. 

Aorta: Unremarkable. No aortic aneurysm. No aortic dissection. 



Lungs: Unremarkable. No consolidation. No masses. 

Pleural spaces: Unremarkable. No pneumothorax. No pleural effusion. 

Heart: Unremarkable. No cardiomegaly. No pericardial effusion. 

Lymph nodes: Unremarkable. No enlarged lymph nodes. 



Bones/joints: Unremarkable. No acute fracture. 

Soft tissues: Multiple hepatic hypodensities consistent with cysts.This patient 

is status post gastric bypass surgery. Small probable hyperdense cyst projects 

off the lateral aspect of the left kidney measuring 1.4 cm. Small simple cyst 

right kidney measures 2.3 cm. Otherwise unremarkable. 



IMPRESSION: 

No acute findings. 



COMMENTS: 

Consistent with the [employer]'s Incidental Findings 

Committee white paper (J Am Kinza Radiol 2018): Any incidental renal lesion less 

than 1 cm or classified as too small to characterize, or any incidental cystic 

renal lesion characterized as simple-appearing, is likely benign. No follow-up 

imaging is recommended for these lesions per consensus recommendations based on 

imaging criteria. 



Electronically signed by: Abhishek Díaz On 11/11/2021  19:37:28 PM

## 2021-11-11 NOTE — CCD
Continuity of Care Document (CCD)

                             Created on: 2021



Sami Claros

External Reference #: [id number]

: 1970

Sex: Male



Demographics





                          Address                   [address] [place] [address]  [address]

 

                          Home Phone                [phone]

 

                          Preferred Language        Unknown

 

                          Marital Status            

 

                          Baptism Affiliation     Unknown

 

                          Race                      White

 

                          Ethnic Group              Not  or 





Author





                          Author                    Sami ALEXIS D.O.

 

                          Organization              Unknown

 

                          [place]  [address]



 

                          Phone                     [phone]







Care Team Providers





                    Care Team Member Name Role                Phone

 

                    Katy Jones AUTM                [phone]

 

                    [facility] - [facility] AUTM                [phone]

 

                          AUTM                      Unavailable







Problems





                    Active Problems     Provider            Date

 

                    Uncomplicated severe persistent asthma MAYITO Cain    O

nset: 10/28/2020







Social History





                Type            Date            Description     Comments

 

                Birth Sex                       Unknown          

 

                Cigarette Use               Pack Years -  05  

 

                Tobacco Use     Start: 89 End: 94 Patient is a forme

r smoker hx:<1ppd 

since age 19 

 

                Smoking Status  Reviewed: 21 Patient is a former smoker hx

:<1ppd since age 

19 







Allergies, Adverse Reactions, Alerts





                                        Description

 

                                        No Known Drug Allergies







Medications





           Active Medications SIG        Qnty       Indications Ordering Provide

r Date

 

                          Hood-24                     200mg Caps ER 24HR        

           1 by mouth 

every day       30caps                          MARIANN DuongO. 2021

 

                          Prednisone                     10mg Tablets           

        1 by mouth every 

day             30tabs          J45.50          Alfonso Alexis D.O. 2021

 

                          Azithromycin                     500mg Tablets        

           1 tab by mouth 

monday, wed and friday 36tabs          J45.50          MARIANN DuongOJennifer 20

21

 

                                        Asmanex Twisthaler 120 Metered Doses    

                 220mcg/Inh Aerosol     

             1 puff by mouth twice a day 1units       J45.50       DELILAH DuongO. 

2020

 

                          Spiriva Respimat                     2.5mcg/Act Aeroso

l                   2 

puffs every day 4gm                             Julissa Nicole M.D. 

 

                          Zyrtec Allergy                     10mg Capsules      

             1 tab by 

mouth every day 30caps                          Unknown         

 

                          Ventolin HFA                     108(90Base) mcg/Act A

erosol                   2

puffs every 4 hours as needed 18gm                            Julissa morris M.D. 

 

                          Symbicort                     160-4.5mcg/Act Aerosol  

                 2 puff 

twice a day     10.200gm                        Julissa Nicole M.D. 

 

                          Levothyroxine Sodium                     75mcg Tablets

                   1 tab 

by mouth every day                                 Unknown         

 

                          Omeprazole                     40mg Capsules DR       

            1 cap by mouth

every day       30caps                          Unknown         

 

                          Meclizine HCL                     25mg Tablets        

           1 by mouth as 

needed                                          Unknown         

 

                          Singulair                     10mg Tablets            

       1 tab by mouth 

every day                                       Unknown         

 

                                        Albuterol Sulfate                     (2

.5mg/3ML) 0.083% Nebulizer              

             1 vial four times a day as needed 1080ml       J45.50       MARIANN Juárez SeO. 

 

                                        History Medications

 

                          Theophylline ER                     400mg Tablets ER 2

4HR                   1 by

mouth two times a day 180tabs         J45.50          Alfonso Alexis D.O.  - 2021

 

                          Prednisone                     10mg Tablets           

        4 tabs po qd x5d, 

3 tabs po qd x5d, 2 tabs po qd x5d, 1 tab x 5 days, then 1/2 tab po x 5 days 

53tabs              J45.50              MARIANN DuongO.    2021 - 

021







Medications Administered in Office





           Medication SIG        Qnty       Indications Ordering Provider Date

 

                          Covid-19 vaccine, Unspecified                      Inj

ection                    

                                                Unknown         2021







Immunizations





             CPT Code     Status       Date         Vaccine      Lot #

 

             [id number]        Given        2020   Afluria, Quadrivalent, 0.5ml

, NDC# 06123-908-83  







Vital Signs





                Date            Vital           Result          Comment

 

                2021 11:25am BP Systolic     108 mmHg         

 

                    BP Diastolic        60 mmHg              

 

                    Heart Rate          69 /min              

 

                    O2 % BldC Oximetry  95 %                 

 

                    Height              71 inches           5'11"

 

                    Weight              381.00 lb            

 

                    BMI (Body Mass Index) 53.1 kg/m2           

 

                    Ideal Body Weight   172 lb               

 

                    Weight              172.822 kg           

 

                    BSA (Body Surface Area) 2.77 m2              

 

                2021  2:16pm Heart Rate      60 /min          

 

                    O2 % BldC Oximetry  97 %                 

 

                    Height              71 inches           5'11"

 

                    Weight              378.00 lb            

 

                    BMI (Body Mass Index) 52.7 kg/m2           

 

                    Ideal Body Weight   172 lb               

 

                    Weight              171.461 kg           

 

                    BSA (Body Surface Area) 2.76 m2              







Results





        Test    Acquired Date Facility Test    Result  H/L     Range   Note

 

                    FVL/[place]           2021          [facility]

             PDFReport    SEE IMAGE                               

 

             FVC-Pred     5.23 L                                  

 

             FVC-Pre      1.72 L                                  

 

             FVC-%Pred-Pre 32 L                                    

 

             FVC-LLN      4.27 L                                  

 

             Fev1-Pred    4.06 L                                  

 

             Fev1-Pre     1.21 L                                  

 

             Fev1-%Pred-Pre 29 L                                    

 

             Fev1-LLN     3.24 L                                  

 

             Fev6-Pred    5.05 L                                  

 

             Fev6-Pre     1.72 L                                  

 

             Fev6-%Pred-Pre 34 L                                    

 

             Fev6-LLN     4.11 L                                  

 

             Fev1fvc-Pred 78 %                                    

 

             Fev1fvc-Pre  70 %                                    

 

             Gvg0fxh-%Pred-Pre 90 %                                    

 

             Pqb6voc-DVA  68 %                                    

 

             Fev6fvc-Pred 97 %                                    

 

             Fev6fvc-Pre  100 %                                   

 

             Kuc2jnv-%Pred-Pre 103 %                                   

 

             FEFMax-Pred  10.05 L/E/sec                            

 

             FEFMax-Pre   4.72 L/E/sec                            

 

             FEFMax-%Pred-Pre 46 L/E/sec                              

 

             FEFMax-LLN   7.66 L/E/sec                            

 

             Fef2575-Pred 3.57 L/E/sec                            

 

             Fef2575-Pre  0.68 L/E/sec                            

 

             Lsy7168-%Pred-Pre 19 L/E/sec                              

 

             Elw0726-YFA  1.92 L/E/sec                            

 

             ExpTime-Pre  6.02 sec                                

 

             Fev1fev6-Pred 80 %                                    

 

             Fev1fev6-Pre 70 %                                    

 

             Cbv3aeu2-%Pred-Pre 87 %                                    

 

             Svl4bzw9-VUV 71 %                                    

 

                    FVL/Timi           2021          [facility]

             PDFReport    SEE IMAGE                               

 

             FVC-Pred     5.26 L                                  

 

             FVC-Pre      1.90 L                                  

 

             FVC-%Pred-Pre 36 L                                    

 

             FVC-LLN      4.29 L                                  

 

             Fev1-Pred    4.09 L                                  

 

             Fev1-Pre     1.41 L                                  

 

             Fev1-%Pred-Pre 34 L                                    

 

             Fev1-LLN     3.27 L                                  

 

             Fev6-Pred    5.08 L                                  

 

             Fev6-Pre     1.87 L                                  

 

             Fev6-%Pred-Pre 36 L                                    

 

             Fev6-LLN     4.14 L                                  

 

             Fev1fvc-Pred 78 %                                    

 

             Fev1fvc-Pre  74 %                                    

 

             Ptt5fsc-%Pred-Pre 95 %                                    

 

             Fbx9ucl-KTO  68 %                                    

 

             Fev6fvc-Pred 97 %                                    

 

             Fev6fvc-Pre  98 %                                    

 

             Ibw0mqf-%Pred-Pre 101 %                                   

 

             FEFMax-Pred  10.10 L/E/sec                            

 

             FEFMax-Pre   5.25 L/E/sec                            

 

             FEFMax-%Pred-Pre 51 L/E/sec                              

 

             FEFMax-LLN   7.71 L/E/sec                            

 

             Fef2575-Pred 3.62 L/E/sec                            

 

             Fef2575-Pre  1.01 L/E/sec                            

 

             Kzj4323-%Pred-Pre 27 L/E/sec                              

 

             Jmd3139-AFA  1.97 L/E/sec                            

 

             ExpTime-Pre  7.28 sec                                

 

             Fev1fev6-Pred 81 %                                    

 

             Fev1fev6-Pre 76 %                                    

 

             Cyr9akh9-%Pred-Pre 93 %                                    

 

             Rdi6wmg1-UYB 72 %                                    

 

                    Rast/Ige, Pulmonary 2021          [facility]

nter Main Lab

                                        [address] [place] [address] [phone] Immunoglobulin E 57.4 IU/mL Normal     <100        

 

                    Rast Zone 1 Region Allergen Panel (Inclu 2021         

 [facility] 

Main Lab

                                        [address] [place] [phone] [phone] Class Description (SEE NOTE)  Normal     .          1

 

             F550-KeD D pteronyssinus <0.10 kU/L   Normal       Class 0       

 

             L540-MtT D farinae Mite <0.10 kU/L   Normal       Class 0       

 

             U013-RvU Cat Epith/Dander 0.56 kU/L    Abnormal     Class II      

 

             S897-ElW Dog Dander 1.53 kU/L    Abnormal     Class III     

 

             C555-WtD Bermuda Grass < 0.10 kU/L  Normal       Class 0       

 

             A076-HpL [place] Bluegrass < 0.10 kU/L  Normal       Class 0      

 

 

             I012-VfG Bahia Grass < 0.10 kU/L  Normal       Class 0       

 

             P812-FtQ Cockroach, American < 0.10 kU/L  Normal       Class 0     

  

 

             M469-BjW Penicillium chrysogen < 0.10 kU/L  Normal       Class 0   

    

 

             M002 IgE Cladosporium herbaru < 0.10 kU/L  Normal       Class 0    

   

 

             M003 IgE Aspergillus fumigatu < 0.10 kU/L  Normal       Class 0    

   

 

             O643-FaQ Mucor racemosus < 0.10 kU/L  Normal       Class 0       

 

             H016-MqO Alternaria alternata < 0.10 kU/L  Normal       Class 0    

   

 

             S013-DkV Stemphylium Herbarum < 0.10 kU/L  Normal       Class 0    

   

 

             J292-YaP Common Silver Birch < 0.10 kU/L  Normal       Class 0     

  

 

             X402-VhT Oak, White < 0.10 kU/L  Normal       Class 0       

 

             G480-DoM Elm, American < 0.10 kU/L  Normal       Class 0       

 

             S661-MbX Deonte, White < 0.10 kU/L  Normal       Class 0       

 

             K764-NnA Maple/Box Elder < 0.10 kU/L  Normal       Class 0       

 

             I655-VcX Hazelnut Tree < 0.10 kU/L  Normal       Class 0       

 

             J894-RyY Hickory, White < 0.10 kU/L  Normal       Class 0       

 

             J486-UkM White Ludlow < 0.10 kU/L  Normal       Class 0       

 

             N325-GiG Sharkey, Mountain < 0.10 kU/L  Normal       Class 0       

 

             B970-CuC Ragweed, Short < 0.10 kU/L  Normal       Class 0       

 

             R820-XpU Mugwort < 0.10 kU/L  Normal       Class 0       

 

             H972-HmH Plantain, English < 0.10 kU/L  Normal       Class 0       

 

             P398-BfN Pigweed, Rough < 0.10 kU/L  Normal       Class 0       

 

             W566-SiM Sheep Sorrel < 0.10 kU/L  Normal       Class 0       

 

             N546-AmL Nettle < 0.10 kU/L  Normal       Class 0      2

 

                    CBC With Differential 2021          [facility] Main Lab

                                        [address] [place] [address] [phone] White Blood Count 4.8 10     Normal     4.0-10.0    

 

             Red Blood Count 4.61 10      Normal       4.30-6.10     

 

             Hemoglobin   13.2 g/dL    Low          13.5-17.5     

 

             Hematocrit   42.2 %       Normal       42.0-52.0     

 

             Mean Corpuscular Volume 91.5 fl      Normal       80.0-96.0     

 

             Mean Corpuscular Hemoglobin 28.6 pg      Normal       27.0-33.0    

 

 

             Mean Corpuscular HGB Conc 31.3 g/dL    Low          32.0-36.5     

 

             Red Cell Distribution Width 14.2 %       Normal       11.5-14.5    

 

 

             Platelet Count, Automated 255 10       Normal       150-450       

 

             Neutrophils % 57.4 %       Normal       36.0-66.0     

 

             Lymph %      30.0 %       Normal       24.0-44.0     

 

             Mono %       8.7 %        High         2.0-8.0       

 

             Eos %        3.1 %        High         0.0-3.0       

 

             Baso %       0.6 %        Normal       0.0-1.0       

 

             Immature Granulocyte % 0.2 %        Normal       0-3.0         

 

             Nucleated Red Blood Cell % 0.0 %        Normal       0-0           

 

             Neutrophils # 2.8 10       Normal       1.5-8.5       

 

             Lymph #      1.5 10       Normal       1.5-5.0       

 

             Mono #       0.4 10       Normal       0.0-0.8       

 

             Eos #        0.2 10       Normal       0.0-0.5       

 

             Baso #       0.0 10       Normal       0.0-0.2       

 

                    FVL/Timi           2021          [facility]

             PDFReport    SEE IMAGE                               

 

             FVC-Pred     5.26 L                                  

 

             FVC-Pre      1.75 L                                  

 

             FVC-%Pred-Pre 33 L                                    

 

             FVC-LLN      4.29 L                                  

 

             Fev1-Pred    4.09 L                                  

 

             Fev1-Pre     1.36 L                                  

 

             Fev1-%Pred-Pre 33 L                                    

 

             Fev1-LLN     3.27 L                                  

 

             Fev6-Pred    5.08 L                                  

 

             Fev6-Pre     1.75 L                                  

 

             Fev6-%Pred-Pre 34 L                                    

 

             Fev6-LLN     4.14 L                                  

 

             Fev1fvc-Pred 78 %                                    

 

             Fev1fvc-Pre  78 %                                    

 

             Hkm8uvn-%Pred-Pre 99 %                                    

 

             Ubh4jmu-RYF  68 %                                    

 

             Fev6fvc-Pred 97 %                                    

 

             Fev6fvc-Pre  100 %                                   

 

             Lxo0btw-%Pred-Pre 103 %                                   

 

             FEFMax-Pred  10.10 L/E/sec                            

 

             FEFMax-Pre   4.56 L/E/sec                            

 

             FEFMax-%Pred-Pre 45 L/E/sec                              

 

             FEFMax-LLN   7.71 L/E/sec                            

 

             Fef2575-Pred 3.62 L/E/sec                            

 

             Fef2575-Pre  1.15 L/E/sec                            

 

             Vrq0732-%Pred-Pre 31 L/E/sec                              

 

             Yjm7749-ZLF  1.97 L/E/sec                            

 

             ExpTime-Pre  5.91 sec                                

 

             Fev1fev6-Pred 81 %                                    

 

             Fev1fev6-Pre 78 %                                    

 

             Dsl4lee9-%Pred-Pre 96 %                                    

 

             Pyj1myd0-DDY 72 %                                    

 

                    Aspergillus Antibodies 2021          [facility] Main Lab

                                        [address] [place] [address]

           [phone] Aspergillus Fumigatus Faith Negative   Normal     Neg:<1

:1    

 

             Aspergillus Flavus Faith Negative     Normal       Neg:<1:1      

 

             Aspergillus Niger Faith Negative     Normal       Neg:<1:1     3

 

                    FVL/Timi           2021          [employer]

             PDFReport    SEE IMAGE                               

 

             FVC-Pred     5.26 L                                  

 

             FVC-Pre      1.73 L                                  

 

             FVC-%Pred-Pre 32 L                                    

 

             FVC-LLN      4.29 L                                  

 

             Fev1-Pred    4.09 L                                  

 

             Fev1-Pre     1.19 L                                  

 

             Fev1-%Pred-Pre 29 L                                    

 

             Fev1-LLN     3.27 L                                  

 

             Fev6-Pred    5.08 L                                  

 

             Fev6-Pre     1.73 L                                  

 

             Fev6-%Pred-Pre 34 L                                    

 

             Fev6-LLN     4.14 L                                  

 

             Fev1fvc-Pred 78 %                                    

 

             Fev1fvc-Pre  69 %                                    

 

             Rnn6rcu-%Pred-Pre 88 %                                    

 

             Ufe4cta-KEW  68 %                                    

 

             Fev6fvc-Pred 97 %                                    

 

             Fev6fvc-Pre  100 %                                   

 

             Etw4jax-%Pred-Pre 103 %                                   

 

             FEFMax-Pred  10.10 L/E/sec                            

 

             FEFMax-Pre   3.07 L/E/sec                            

 

             FEFMax-%Pred-Pre 30 L/E/sec                              

 

             FEFMax-LLN   7.71 L/E/sec                            

 

             Fef2575-Pred 3.62 L/E/sec                            

 

             Fef2575-Pre  0.71 L/E/sec                            

 

             Cpc0966-%Pred-Pre 19 L/E/sec                              

 

             Ohx3625-INH  1.97 L/E/sec                            

 

             ExpTime-Pre  6.66 sec                                

 

             Fev1fev6-Pred 81 %                                    

 

             Fev1fev6-Pre 69 %                                    

 

             Yud6qri5-%Pred-Pre 85 %                                    

 

             Kpu8nqb3-SOA 72 %                                    







                          1                         .

Levels of Specific IgE       Class  Description of Class

                                        ---------------------------  -----  ----

----------------

< 0.10         0         Negative

                                        0.10 -    0.31         0/I       Equivoc

al/Low

                                        0.32 -    0.55         I         Low

                                        0.56 -    1.40         II        Moderat

e

                                        1.41 -    3.90         III       High

                                        3.91 -   19.00         IV        Very Hi

gh

                                        19.01 -  100.00         V         Very H

igh

>100.00         VI        Very High



 

                          2                         Performed at:  [address]  [id number]

61

: Che Hurst MD, Phone:  [phone]



 

                          3                         Performed at:  [address]  [id number]

61

: Che Hurst MD, Phone:  [phone]









Procedures





                Date            Code            Description     Status

 

                2021      71840           Office/Outpatient Established Mo

d MDM 30-39 Min Completed

 

                2021      16029           Spirometry      Completed

 

                2021      86418           Office/Outpatient Established Mo

d MDM 30-39 Min Completed

 

                2021      54833           Spirometry      Completed

 

                2021      34100           Office/Outpatient Established Lo

w MDM 20-29 Min Completed

 

                2021      79840           Spirometry      Completed







[employer]





                                        Description

 

                                        No Information Available







Encounters





           Type       Date       Location   Provider   Dx         Diagnosis

 

             Office Visit 2021  2:00p [facility] Alfonso Se hunter D.O. 

J45.50                                  Severe persistent asthma, uncomplicated

 

             Office Visit 2021 11:00a [facility] Al[facility]so DELILAH Guallpa.O. 

J45.50                                  Severe persistent asthma, uncomplicated

 

             Office Visit 2021  1:30p [facility] Alfonso Se hunter D.O. 

J45.50                                  Severe persistent asthma, uncomplicated







Assessments





                Date            Code            Description     Provider

 

                2021      J45.50          Severe persistent asthma, uncomp

licated Alfonso Sears, D.O.

 

                2021      R05             Cough           Alfonso Sears, D.O.

 

                2021      K21.9           Gastro-esophageal reflux disease

 without esophagitis Alfonso Alexis D.O.

 

                2021      J45.50          Severe persistent asthma, uncomp

licated Alfonso Sears D.O.

 

                2021      J45.50          Severe persistent asthma, uncomp

licated Alfonso Sears, D.O.

 

                2021      J45.50          Severe persistent asthma, uncomp

licated Alfonso Alexis D.O.







Plan of Treatment

2021 - Alfonso Alexis D.O.* J45.50 Severe persistent asthma, uncomplicated

* R05 Cough

* K21.9 Gastro-esophageal reflux disease without esophagitis

* * New Labs:* FVL/[place], Ordered: 21

* Theophylline Level, Ordered: 21



* Follow up:* Follow up in November with hood level and timi, me please









Functional Status





                Functional Condition Comment         Date            Status

 

                Independent with all ADL's                                 Activ

e

 

                Independent with all IADL's                                 Acti

ve







Mental Status





                Mental Condition Comment         Date            Status

 

                Cognitive ability not impaired                                 A

ctive







Referrals





                Refer to      Reason for Referral Status          Appt Date

 

                Radiology/Procedure 12048           Closed          2021

## 2021-11-11 NOTE — CCD
Continuity of Care Document (CCD)

                             Created on: 2021



Sami Claros

External Reference #: [id number]

: 1970

Sex: Male



Demographics





                          Address                   [address] [place] [address]  [address]

 

                          Home Phone                [phone]

 

                          Preferred Language        Unknown

 

                          Marital Status            

 

                          Jehovah's witness Affiliation     Unknown

 

                          Race                      White

 

                          Ethnic Group              Not  or 





Author





                          Author                    Sami ALEXIS D.O.

 

                          Organization              Unknown

 

                          [place]  [address]



 

                          Phone                     [phone]







Care Team Providers





                    Care Team Member Name Role                Phone

 

                    Katy Jones AUTM                [phone]

 

                    [facility] - [facility] AUTM                [phone]

 

                          AUTM                      Unavailable







Problems





                    Active Problems     Provider            Date

 

                    Uncomplicated severe persistent asthma MAYITO Cain    O

nset: 10/28/2020







Social History





                Type            Date            Description     Comments

 

                Birth Sex                       Unknown          

 

                Cigarette Use               Pack Years -  05  

 

                Tobacco Use     Start: 89 End: 94 Patient is a forme

r smoker hx:<1ppd 

since age 19 

 

                Smoking Status  Reviewed: 21 Patient is a former smoker hx

:<1ppd since age 

19 







Allergies, Adverse Reactions, Alerts





                                        Description

 

                                        No Known Drug Allergies







Medications





           Active Medications SIG        Qnty       Indications Ordering Provide

r Date

 

                          Hood-24                     200mg Caps ER 24HR        

           1 by mouth 

every day       30caps                          MARIANN DuongO. 2021

 

                          Prednisone                     10mg Tablets           

        1 by mouth every 

day             30tabs          J45.50          Alfonso Alexis D.O. 2021

 

                          Azithromycin                     500mg Tablets        

           1 tab by mouth 

monday, wed and friday 36tabs          J45.50          MARIANN DuongOJennifer 20

21

 

                                        Asmanex Twisthaler 120 Metered Doses    

                 220mcg/Inh Aerosol     

             1 puff by mouth twice a day 1units       J45.50       DELILAH DuongO. 

2020

 

                          Spiriva Respimat                     2.5mcg/Act Aeroso

l                   2 

puffs every day 4gm                             Julissa Nicole M.D. 

 

                          Zyrtec Allergy                     10mg Capsules      

             1 tab by 

mouth every day 30caps                          Unknown         

 

                          Ventolin HFA                     108(90Base) mcg/Act A

erosol                   2

puffs every 4 hours as needed 18gm                            Julissa morris M.D. 

 

                          Symbicort                     160-4.5mcg/Act Aerosol  

                 2 puff 

twice a day     10.200gm                        Julissa Nicole M.D. 

 

                          Levothyroxine Sodium                     75mcg Tablets

                   1 tab 

by mouth every day                                 Unknown         

 

                          Omeprazole                     40mg Capsules DR       

            1 cap by mouth

every day       30caps                          Unknown         

 

                          Meclizine HCL                     25mg Tablets        

           1 by mouth as 

needed                                          Unknown         

 

                          Singulair                     10mg Tablets            

       1 tab by mouth 

every day                                       Unknown         

 

                                        Albuterol Sulfate                     (2

.5mg/3ML) 0.083% Nebulizer              

             1 vial four times a day as needed 1080ml       J45.50       MARIANN Juárez SeO. 

 

                                        History Medications

 

                          Theophylline ER                     400mg Tablets ER 2

4HR                   1 by

mouth two times a day 180tabs         J45.50          Alfonso Alexis D.O.  - 2021

 

                          Prednisone                     10mg Tablets           

        4 tabs po qd x5d, 

3 tabs po qd x5d, 2 tabs po qd x5d, 1 tab x 5 days, then 1/2 tab po x 5 days 

53tabs              J45.50              MARIANN DuongO.    2021 - 

021







Medications Administered in Office





           Medication SIG        Qnty       Indications Ordering Provider Date

 

                          Covid-19 vaccine, Unspecified                      Inj

ection                    

                                                Unknown         2021







Immunizations





             CPT Code     Status       Date         Vaccine      Lot #

 

             [id number]        Given        2020   Afluria, Quadrivalent, 0.5ml

, NDC# 58540-648-36  







Vital Signs





                Date            Vital           Result          Comment

 

                2021  2:16pm Heart Rate      60 /min          

 

                    O2 % BldC Oximetry  97 %                 

 

                    Height              71 inches           5'11"

 

                    Weight              378.00 lb            

 

                    BMI (Body Mass Index) 52.7 kg/m2           

 

                    Ideal Body Weight   172 lb               

 

                    Weight              171.461 kg           

 

                    BSA (Body Surface Area) 2.76 m2              

 

                2021 11:04am BP Systolic     110 mmHg         

 

                    BP Diastolic        70 mmHg              

 

                    Heart Rate          67 /min              

 

                    O2 % BldC Oximetry  95 %                 

 

                    Body Temperature    97.2 F             

 

                    Height              71 inches           5'11"

 

                    Weight              374.00 lb            

 

                    BMI (Body Mass Index) 52.2 kg/m2           

 

                    Ideal Body Weight   172 lb               

 

                    Weight              169.646 kg           

 

                    BSA (Body Surface Area) 2.75 m2              







Results





        Test    Acquired Date Facility Test    Result  H/L     Range   Note

 

                    [facility]           2021          [facility]

             PDFReport    SEE IMAGE                               

 

             FVC-Pred     5.26 L                                  

 

             FVC-Pre      1.90 L                                  

 

             FVC-%Pred-Pre 36 L                                    

 

             FVC-LLN      4.29 L                                  

 

             Fev1-Pred    4.09 L                                  

 

             Fev1-Pre     1.41 L                                  

 

             Fev1-%Pred-Pre 34 L                                    

 

             Fev1-LLN     3.27 L                                  

 

             Fev6-Pred    5.08 L                                  

 

             Fev6-Pre     1.87 L                                  

 

             Fev6-%Pred-Pre 36 L                                    

 

             Fev6-LLN     4.14 L                                  

 

             Fev1fvc-Pred 78 %                                    

 

             Fev1fvc-Pre  74 %                                    

 

             Vjx7fnv-%Pred-Pre 95 %                                    

 

             Lnj5ngj-RTS  68 %                                    

 

             Fev6fvc-Pred 97 %                                    

 

             Fev6fvc-Pre  98 %                                    

 

             Gor1lou-%Pred-Pre 101 %                                   

 

             FEFMax-Pred  10.10 L/E/sec                            

 

             FEFMax-Pre   5.25 L/E/sec                            

 

             FEFMax-%Pred-Pre 51 L/E/sec                              

 

             FEFMax-LLN   7.71 L/E/sec                            

 

             Fef2575-Pred 3.62 L/E/sec                            

 

             Fef2575-Pre  1.01 L/E/sec                            

 

             Jkh7611-%Pred-Pre 27 L/E/sec                              

 

             Amq9653-CYJ  1.97 L/E/sec                            

 

             ExpTime-Pre  7.28 sec                                

 

             Fev1fev6-Pred 81 %                                    

 

             Fev1fev6-Pre 76 %                                    

 

             Gef1owg2-%Pred-Pre 93 %                                    

 

             Cch1dxu4-ATR 72 %                                    

 

                    [facility], Pulmonary 2021          [facility]

nter Main Lab

                                        [address] [place], [place] [id number] [phone] Immunoglobulin E 57.4 IU/mL Normal     <100        

 

                    [facility] Zone 1 Region Allergen Panel (Inclu 2021         

 [facility] 

Main Lab

                                        [address] [place] [address]

           [phone] Class Description (SEE NOTE)  Normal     .          1

 

             Q031-OoH D pteronyssinus <0.10 kU/L   Normal       Class 0       

 

             G627-AgB D farinae Mite <0.10 kU/L   Normal       Class 0       

 

             D856-OiR Cat Epith/Dander 0.56 kU/L    Abnormal     Class II      

 

             B789-WyE Dog Dander 1.53 kU/L    Abnormal     Class III     

 

             C024-YzI Bermuda Grass < 0.10 kU/L  Normal       Class 0       

 

             S587-BeV [place] < 0.10 kU/L  Normal       Class 0      

 

 

             T935-GjT Bahia Grass < 0.10 kU/L  Normal       Class 0       

 

             U303-QwY Cockroach, American < 0.10 kU/L  Normal       Class 0     

  

 

             N162-NeG Penicillium chrysogen < 0.10 kU/L  Normal       Class 0   

    

 

             M002 IgE Cladosporium herbaru < 0.10 kU/L  Normal       Class 0    

   

 

             M003 IgE Aspergillus fumigatu < 0.10 kU/L  Normal       Class 0    

   

 

             V373-FtF Mucor racemosus < 0.10 kU/L  Normal       Class 0       

 

             S078-SpA Alternaria alternata < 0.10 kU/L  Normal       Class 0    

   

 

             Q913-MrD Stemphylium Herbarum < 0.10 kU/L  Normal       Class 0    

   

 

             V394-SnI Common Silver Birch < 0.10 kU/L  Normal       Class 0     

  

 

             R118-JrF Oak, White < 0.10 kU/L  Normal       Class 0       

 

             E459-VyZ Elm, American < 0.10 kU/L  Normal       Class 0       

 

             H569-NnD Deonte, White < 0.10 kU/L  Normal       Class 0       

 

             H743-OfU Maple/Box Elder < 0.10 kU/L  Normal       Class 0       

 

             W447-AuW Hazelnut Tree < 0.10 kU/L  Normal       Class 0       

 

             N459-HxS [place], White < 0.10 kU/L  Normal       Class 0       

 

             V507-ZbW White [place] < 0.10 kU/L  Normal       Class 0       

 

             P260-LgR [place], Mountain < 0.10 kU/L  Normal       Class 0       

 

             V641-ZlL Ragweed, Short < 0.10 kU/L  Normal       Class 0       

 

             H605-JyQ Mugwort < 0.10 kU/L  Normal       Class 0       

 

             V694-VnM Plantain, English < 0.10 kU/L  Normal       Class 0       

 

             O135-EmU Pigweed, Rough < 0.10 kU/L  Normal       Class 0       

 

             X046-YlN Sheep Sorrel < 0.10 kU/L  Normal       Class 0       

 

             G620-CwZ Nettle < 0.10 kU/L  Normal       Class 0      2

 

                    CBC With Differential 2021          [facility] Main Lab

                                        [address] [address] [phone] White Blood Count 4.8 10     Normal     4.0-10.0    

 

             Red Blood Count 4.61 10      Normal       4.30-6.10     

 

             Hemoglobin   13.2 g/dL    Low          13.5-17.5     

 

             Hematocrit   42.2 %       Normal       42.0-52.0     

 

             Mean Corpuscular Volume 91.5 fl      Normal       80.0-96.0     

 

             Mean Corpuscular Hemoglobin 28.6 pg      Normal       27.0-33.0    

 

 

             Mean Corpuscular HGB Conc 31.3 g/dL    Low          32.0-36.5     

 

             Red Cell Distribution Width 14.2 %       Normal       11.5-14.5    

 

 

             Platelet Count, Automated 255 10       Normal       150-450       

 

             Neutrophils % 57.4 %       Normal       36.0-66.0     

 

             Lymph %      30.0 %       Normal       24.0-44.0     

 

             Mono %       8.7 %        High         2.0-8.0       

 

             Eos %        3.1 %        High         0.0-3.0       

 

             Baso %       0.6 %        Normal       0.0-1.0       

 

             Immature Granulocyte % 0.2 %        Normal       0-3.0         

 

             Nucleated Red Blood Cell % 0.0 %        Normal       0-0           

 

             Neutrophils # 2.8 10       Normal       1.5-8.5       

 

             Lymph #      1.5 10       Normal       1.5-5.0       

 

             Mono #       0.4 10       Normal       0.0-0.8       

 

             Eos #        0.2 10       Normal       0.0-0.5       

 

             Baso #       0.0 10       Normal       0.0-0.2       

 

                    FVL/[facility]           2021          [facility]

             PDFReport    SEE IMAGE                               

 

             FVC-Pred     5.26 L                                  

 

             FVC-Pre      1.75 L                                  

 

             FVC-%Pred-Pre 33 L                                    

 

             FVC-LLN      4.29 L                                  

 

             Fev1-Pred    4.09 L                                  

 

             Fev1-Pre     1.36 L                                  

 

             Fev1-%Pred-Pre 33 L                                    

 

             Fev1-LLN     3.27 L                                  

 

             Fev6-Pred    5.08 L                                  

 

             Fev6-Pre     1.75 L                                  

 

             Fev6-%Pred-Pre 34 L                                    

 

             Fev6-LLN     4.14 L                                  

 

             Fev1fvc-Pred 78 %                                    

 

             Fev1fvc-Pre  78 %                                    

 

             Elj4amb-%Pred-Pre 99 %                                    

 

             Evs1cgk-YJK  68 %                                    

 

             Fev6fvc-Pred 97 %                                    

 

             Fev6fvc-Pre  100 %                                   

 

             Ywl4eva-%Pred-Pre 103 %                                   

 

             FEFMax-Pred  10.10 L/E/sec                            

 

             FEFMax-Pre   4.56 L/E/sec                            

 

             FEFMax-%Pred-Pre 45 L/E/sec                              

 

             FEFMax-LLN   7.71 L/E/sec                            

 

             Fef2575-Pred 3.62 L/E/sec                            

 

             Fef2575-Pre  1.15 L/E/sec                            

 

             Dyu9526-%Pred-Pre 31 L/E/sec                              

 

             Tng0232-XZH  1.97 L/E/sec                            

 

             ExpTime-Pre  5.91 sec                                

 

             Fev1fev6-Pred 81 %                                    

 

             Fev1fev6-Pre 78 %                                    

 

             Yta0lre3-%Pred-Pre 96 %                                    

 

             Xjk2tna1-IBG 72 %                                    

 

                    Aspergillus Antibodies 2021          [facility] Main Lab

                                        [address], [place] [address] [phone] Aspergillus Fumigatus Faith Negative   Normal     Neg:<1

:1    

 

             Aspergillus Flavus Faith Negative     Normal       Neg:<1:1      

 

             Aspergillus Niger Faith Negative     Normal       Neg:<1:1     3

 

                    [facility]/[place]           2021          [facility]

             PDFReport    SEE IMAGE                               

 

             FVC-Pred     5.26 L                                  

 

             FVC-Pre      1.73 L                                  

 

             FVC-%Pred-Pre 32 L                                    

 

             FVC-LLN      4.29 L                                  

 

             Fev1-Pred    4.09 L                                  

 

             Fev1-Pre     1.19 L                                  

 

             Fev1-%Pred-Pre 29 L                                    

 

             Fev1-LLN     3.27 L                                  

 

             Fev6-Pred    5.08 L                                  

 

             Fev6-Pre     1.73 L                                  

 

             Fev6-%Pred-Pre 34 L                                    

 

             Fev6-LLN     4.14 L                                  

 

             Fev1fvc-Pred 78 %                                    

 

             Fev1fvc-Pre  69 %                                    

 

             Qef0okn-%Pred-Pre 88 %                                    

 

             Iye4zsr-BHD  68 %                                    

 

             Fev6fvc-Pred 97 %                                    

 

             Fev6fvc-Pre  100 %                                   

 

             Dmt6kyq-%Pred-Pre 103 %                                   

 

             FEFMax-Pred  10.10 L/E/sec                            

 

             FEFMax-Pre   3.07 L/E/sec                            

 

             FEFMax-%Pred-Pre 30 L/E/sec                              

 

             FEFMax-LLN   7.71 L/E/sec                            

 

             Fef2575-Pred 3.62 L/E/sec                            

 

             Fef2575-Pre  0.71 L/E/sec                            

 

             Vbq0629-%Pred-Pre 19 L/E/sec                              

 

             Mts2916-VIL  1.97 L/E/sec                            

 

             ExpTime-Pre  6.66 sec                                

 

             Fev1fev6-Pred 81 %                                    

 

             Fev1fev6-Pre 69 %                                    

 

             Ieb9zmm1-%Pred-Pre 85 %                                    

 

             Jhs8rlk9-ITM 72 %                                    







                          1                         .

Levels of Specific IgE       Class  Description of Class

                                        ---------------------------  -----  ----

----------------

< 0.10         0         Negative

                                        0.10 -    0.31         0/I       Equivoc

al/Low

                                        0.32 -    0.55         I         Low

                                        0.56 -    1.40         II        Moderat

e

                                        1.41 -    3.90         III       High

                                        3.91 -   19.00         IV        Very Hi

gh

                                        19.01 -  100.00         V         Very H

igh

>100.00         VI        Very High



 

                          2                         Performed at:  [address]  [id number]

61

: Che Hurst MD, Phone:  [phone]



 

                          3                         Performed at:  [address]  [id number]

61

: Che Hurst MD, Phone:  [phone]









Procedures





                Date            Code            Description     Status

 

                2021      66693           Office/Outpatient Established Mo

d MDM 30-39 Min Completed

 

                2021      28294           Spirometry      Completed

 

                2021      74733           Office/Outpatient Established Mo

d MDM 30-39 Min Completed

 

                2021      10146           Spirometry      Completed

 

                2021      79546           Office/Outpatient Established Lo

w MDM 20-29 Min Completed

 

                2021      61677           Spirometry      Completed







[employer]





                                        Description

 

                                        No Information Available







Encounters





           Type       Date       Location   Provider   Dx         Diagnosis

 

             Office Visit 2021  2:00p [facility] Alfonso hunter D.OJennifer 

J45.50                                  Severe persistent asthma, uncomplicated

 

             Office Visit 2021 11:00a [facility] Alfonso hunter D.OJennifer 

J45.50                                  Severe persistent asthma, uncomplicated

 

             Office Visit 2021  1:30p [facility] Alfonso hunter DJenniferO. 

J45.50                                  Severe persistent asthma, uncomplicated







Assessments





                Date            Code            Description     Provider

 

                2021      J45.50          Severe persistent asthma, uncomp

licated Alfonso Sears, D.O.

 

                2021      J45.50          Severe persistent asthma, uncomp

licated Alfonso Sears, D.O.

 

                2021      J45.50          Severe persistent asthma, uncomp

licated Alfonso Sears, D.O.







Plan of Treatment

2021 - Alfonso Alexis D.DELIA.* J45.50 Severe persistent asthma, uncomplicated

* * Follow up:* Hood level in one week. Follow up in one month with spirometry, 
  me, not PA. Recommend Allergist referral.









Functional Status





                Functional Condition Comment         Date            Status

 

                Independent with all ADL's                                 Activ

e

 

                Independent with all IADL's                                 Acti

ve







Mental Status





                Mental Condition Comment         Date            Status

 

                Cognitive ability not impaired                                 A

ctive







Referrals





                Refer to      Reason for Referral Status          Appt Date

 

                Radiology/Procedure 53330           Closed          2021

## 2021-11-11 NOTE — CCD
Continuity of Care Document (CCD)

                             Created on: 2021



Sami Claros

External Reference #: [id number]

: 1970

Sex: Male



Demographics





                          Address                   [address] [place] [address]  [address]

 

                          Home Phone                [phone]

 

                          Preferred Language        Unknown

 

                          Marital Status            

 

                          Yazdanism Affiliation     Unknown

 

                          Race                      White

 

                          Ethnic Group              Not  or 





Author





                          Author                    Sami ALEXIS D.O.

 

                          Organization              Unknown

 

                          [place]  [address]



 

                          Phone                     [phone]







Care Team Providers





                    Care Team Member Name Role                Phone

 

                    Katy Jones AUTM                [phone]

 

                    [facility] AUTM                [phone]

 

                          AUTM                      Unavailable







Problems





                    Active Problems     Provider            Date

 

                    Uncomplicated severe persistent asthma MAYITO Cain    O

nset: 10/28/2020







Social History





                Type            Date            Description     Comments

 

                Birth Sex                       Unknown          

 

                Cigarette Use               Pack Years -  05  

 

                Tobacco Use     Start: 89 End: 94 Patient is a forme

r smoker hx:<1ppd 

since age 19 

 

                Smoking Status  Reviewed: 21 Patient is a former smoker hx

:<1ppd since age 

19 







Allergies, Adverse Reactions, Alerts





                                        Description

 

                                        No Known Drug Allergies







Medications





           Active Medications SIG        Qnty       Indications Ordering Provide

r Date

 

                          Prednisone                     10mg Tablets           

        4 tabs po qd x5d, 

3 tabs po qd x5d, 2 tabs po qd x5d, 1 tab x 5 days, then 1/2 tab po x 5 days 

53tabs              J45.50              Alfonso Alexis D.O.    2021

 

                    Hood-24                     200mg Caps ER 24HR              

     1 by mouth bid      

60caps                                  MARIANN DuongO.    2021

 

                          Prednisone                     10mg Tablets           

        1 by mouth every 

day             30tabs          J45.50          Alfonso Alexis D.O. 2021

 

                          Azithromycin                     500mg Tablets        

           1 tab by mouth 

monday, wed and friday 36tabs          J45.50          MARIANN DuongO. 20

21

 

                                        Asmanex Twisthaler 120 Metered Doses    

                 220mcg/Inh Aerosol     

             1 puff by mouth twice a day 1units       J45.50       DELILAH DuongO. 

2020

 

                          Spiriva Respimat                     2.5mcg/Act Aeroso

l                   2 

puffs every day 4gm                             Julissa Nicole M.D. 

 

                          Zyrtec Allergy                     10mg Capsules      

             1 tab by 

mouth every day 30caps                          Unknown         

 

                          Ventolin HFA                     108(90Base) mcg/Act A

erosol                   2

puffs every 4 hours as needed 18gm                            Julissa morris M.D. 

 

                          Symbicort                     160-4.5mcg/Act Aerosol  

                 2 puff 

twice a day     10.200gm                        Julissa Nicole M.D. 

 

                          Levothyroxine Sodium                     75mcg Tablets

                   1 tab 

by mouth every day                                 Unknown         

 

                          Omeprazole                     40mg Capsules DR       

            1 cap by mouth

every day       30caps                          Unknown         

 

                          Meclizine HCL                     25mg Tablets        

           1 by mouth as 

needed                                          Unknown         

 

                          Singulair                     10mg Tablets            

       1 tab by mouth 

every day                                       Unknown         

 

                                        Albuterol Sulfate                     (2

.5mg/3ML) 0.083% Nebulizer              

             1 vial four times a day as needed 1080ml       J45.50       MARIANN Juárez SeO. 

 

                                        History Medications

 

                          Theophylline ER                     400mg Tablets ER 2

4HR                   1 by

mouth two times a day 180tabs         J45.50          MARIANN DuongO.  - 2021

 

                          Prednisone                     10mg Tablets           

        4 tabs po qd x5d, 

3 tabs po qd x5d, 2 tabs po qd x5d, 1 tab x 5 days, then 1/2 tab po x 5 days 

53tabs              J45.50              MARIANN DuongO.    2021 - 

021







Medications Administered in Office





           Medication SIG        Qnty       Indications Ordering Provider Date

 

                          Covid-19 vaccine, Unspecified                      Inj

ection                    

                                                Unknown         2021







Immunizations





             CPT Code     Status       Date         Vaccine      Lot #

 

             [id number]        Given        2020   Afluria, Quadrivalent, 0.5ml

, NDC# [id number]  







Vital Signs





                Date            Vital           Result          Comment

 

                2021 11:25am BP Systolic     108 mmHg         

 

                    BP Diastolic        60 mmHg              

 

                    Heart Rate          69 /min              

 

                    O2 % BldC Oximetry  95 %                 

 

                    Height              71 inches           5'11"

 

                    Weight              381.00 lb            

 

                    BMI (Body Mass Index) 53.1 kg/m2           

 

                    Ideal Body Weight   172 lb               

 

                    Weight              172.822 kg           

 

                    BSA (Body Surface Area) 2.77 m2              

 

                2021  2:16pm Heart Rate      60 /min          

 

                    O2 % BldC Oximetry  97 %                 

 

                    Height              71 inches           5'11"

 

                    Weight              378.00 lb            

 

                    BMI (Body Mass Index) 52.7 kg/m2           

 

                    Ideal Body Weight   172 lb               

 

                    Weight              171.461 kg           

 

                    BSA (Body Surface Area) 2.76 m2              







Results





        Test    Acquired Date Facility Test    Result  H/L     Range   Note

 

                    FVL/[place]           2021          [facility]

             PDFReport    SEE IMAGE                               

 

             FVC-Pred     5.23 L                                  

 

             FVC-Pre      1.72 L                                  

 

             FVC-%Pred-Pre 32 L                                    

 

             FVC-LLN      4.27 L                                  

 

             Fev1-Pred    4.06 L                                  

 

             Fev1-Pre     1.21 L                                  

 

             Fev1-%Pred-Pre 29 L                                    

 

             Fev1-LLN     3.24 L                                  

 

             Fev6-Pred    5.05 L                                  

 

             Fev6-Pre     1.72 L                                  

 

             Fev6-%Pred-Pre 34 L                                    

 

             Fev6-LLN     4.11 L                                  

 

             Fev1fvc-Pred 78 %                                    

 

             Fev1fvc-Pre  70 %                                    

 

             Xhk7jow-%Pred-Pre 90 %                                    

 

             Rrj9aia-WWC  68 %                                    

 

             Fev6fvc-Pred 97 %                                    

 

             Fev6fvc-Pre  100 %                                   

 

             Tgo0jlq-%Pred-Pre 103 %                                   

 

             FEFMax-Pred  10.05 L/E/sec                            

 

             FEFMax-Pre   4.72 L/E/sec                            

 

             FEFMax-%Pred-Pre 46 L/E/sec                              

 

             FEFMax-LLN   7.66 L/E/sec                            

 

             Fef2575-Pred 3.57 L/E/sec                            

 

             Fef2575-Pre  0.68 L/E/sec                            

 

             Qxh2757-%Pred-Pre 19 L/E/sec                              

 

             Gfm2741-RJO  1.92 L/E/sec                            

 

             ExpTime-Pre  6.02 sec                                

 

             Fev1fev6-Pred 80 %                                    

 

             Fev1fev6-Pre 70 %                                    

 

             Fmb4yjr5-%Pred-Pre 87 %                                    

 

             Ztn4mpe7-RCZ 71 %                                    

 

                    FVL/Timi           2021          [facility]

             PDFReport    SEE IMAGE                               

 

             FVC-Pred     5.26 L                                  

 

             FVC-Pre      1.90 L                                  

 

             FVC-%Pred-Pre 36 L                                    

 

             FVC-LLN      4.29 L                                  

 

             Fev1-Pred    4.09 L                                  

 

             Fev1-Pre     1.41 L                                  

 

             Fev1-%Pred-Pre 34 L                                    

 

             Fev1-LLN     3.27 L                                  

 

             Fev6-Pred    5.08 L                                  

 

             Fev6-Pre     1.87 L                                  

 

             Fev6-%Pred-Pre 36 L                                    

 

             Fev6-LLN     4.14 L                                  

 

             Fev1fvc-Pred 78 %                                    

 

             Fev1fvc-Pre  74 %                                    

 

             Dvx6cob-%Pred-Pre 95 %                                    

 

             Kmd5dov-RWM  68 %                                    

 

             Fev6fvc-Pred 97 %                                    

 

             Fev6fvc-Pre  98 %                                    

 

             Epk7neu-%Pred-Pre 101 %                                   

 

             FEFMax-Pred  10.10 L/E/sec                            

 

             FEFMax-Pre   5.25 L/E/sec                            

 

             FEFMax-%Pred-Pre 51 L/E/sec                              

 

             FEFMax-LLN   7.71 L/E/sec                            

 

             Fef2575-Pred 3.62 L/E/sec                            

 

             Fef2575-Pre  1.01 L/E/sec                            

 

             Ege9920-%Pred-Pre 27 L/E/sec                              

 

             Rwe1143-LYY  1.97 L/E/sec                            

 

             ExpTime-Pre  7.28 sec                                

 

             Fev1fev6-Pred 81 %                                    

 

             Fev1fev6-Pre 76 %                                    

 

             Iqw8vvx8-%Pred-Pre 93 %                                    

 

             Vwc1zdv9-CQY 72 %                                    

 

                    [facility]ge, Pulmonary 2021          [facility]

nter Main Lab

                                        [address] [place] [address] [phone] Immunoglobulin E 57.4 IU/mL Normal     <100        

 

                    Rast Zone 1 Region Allergen Panel (Inclu 2021         

 [facility] 

Main Lab

                                        [address] [place] [address] [phone] Class Description (SEE NOTE)  Normal     .          1

 

             G414-VmW D pteronyssinus <0.10 kU/L   Normal       Class 0       

 

             A467-DiF D farinae Mite <0.10 kU/L   Normal       Class 0       

 

             Z862-HaS Cat Epith/Dander 0.56 kU/L    Abnormal     Class II      

 

             U582-JyU Dog Dander 1.53 kU/L    Abnormal     Class III     

 

             D189-OlZ Bermuda Grass < 0.10 kU/L  Normal       Class 0       

 

             M004-OzC [place] Blue[place] < 0.10 kU/L  Normal       Class 0      

 

 

             W108-QyJ Bahia Grass < 0.10 kU/L  Normal       Class 0       

 

             E555-OeG Cockroach, American < 0.10 kU/L  Normal       Class 0     

  

 

             I330-YeU Penicillium chrysogen < 0.10 kU/L  Normal       Class 0   

    

 

             M002 IgE Cladosporium herbaru < 0.10 kU/L  Normal       Class 0    

   

 

             M003 IgE Aspergillus fumigatu < 0.10 kU/L  Normal       Class 0    

   

 

             T058-MiW Mucor racemosus < 0.10 kU/L  Normal       Class 0       

 

             X090-WiY Alternaria alternata < 0.10 kU/L  Normal       Class 0    

   

 

             Q417-VcH Stemphylium Herbarum < 0.10 kU/L  Normal       Class 0    

   

 

             U560-WsK Common Silver Birch < 0.10 kU/L  Normal       Class 0     

  

 

             N690-OlQ Oak, White < 0.10 kU/L  Normal       Class 0       

 

             P338-YgS Elm, American < 0.10 kU/L  Normal       Class 0       

 

             T320-EtK Deonte, White < 0.10 kU/L  Normal       Class 0       

 

             Z441-UmS Maple/[place] < 0.10 kU/L  Normal       Class 0       

 

             G045-IhL Hazelnut Tree < 0.10 kU/L  Normal       Class 0       

 

             N921-BaB [place], White < 0.10 kU/L  Normal       Class 0       

 

             U215-EtA [place] < 0.10 kU/L  Normal       Class 0       

 

             Y511-DlJ [place], Mountain < 0.10 kU/L  Normal       Class 0       

 

             O648-XbL Ragweed, Short < 0.10 kU/L  Normal       Class 0       

 

             I580-WwZ Mugwort < 0.10 kU/L  Normal       Class 0       

 

             Y739-WiI Plantain, English < 0.10 kU/L  Normal       Class 0       

 

             V636-ZnW Pigweed, Rough < 0.10 kU/L  Normal       Class 0       

 

             D741-WaZ Sheep Sorrel < 0.10 kU/L  Normal       Class 0       

 

             D890-ThM Nettle < 0.10 kU/L  Normal       Class 0      2

 

                    CBC With Differential 2021          [facility] Main Lab

                                        [address] [place] [address] [phone] White Blood Count 4.8 10     Normal     4.0-10.0    

 

             Red Blood Count 4.61 10      Normal       4.30-6.10     

 

             Hemoglobin   13.2 g/dL    Low          13.5-17.5     

 

             Hematocrit   42.2 %       Normal       42.0-52.0     

 

             Mean Corpuscular Volume 91.5 fl      Normal       80.0-96.0     

 

             Mean Corpuscular Hemoglobin 28.6 pg      Normal       27.0-33.0    

 

 

             Mean Corpuscular HGB Conc 31.3 g/dL    Low          32.0-36.5     

 

             Red Cell Distribution Width 14.2 %       Normal       11.5-14.5    

 

 

             Platelet Count, Automated 255 10       Normal       150-450       

 

             Neutrophils % 57.4 %       Normal       36.0-66.0     

 

             Lymph %      30.0 %       Normal       24.0-44.0     

 

             Mono %       8.7 %        High         2.0-8.0       

 

             Eos %        3.1 %        High         0.0-3.0       

 

             Baso %       0.6 %        Normal       0.0-1.0       

 

             Immature Granulocyte % 0.2 %        Normal       0-3.0         

 

             Nucleated Red Blood Cell % 0.0 %        Normal       0-0           

 

             Neutrophils # 2.8 10       Normal       1.5-8.5       

 

             Lymph #      1.5 10       Normal       1.5-5.0       

 

             Mono #       0.4 10       Normal       0.0-0.8       

 

             Eos #        0.2 10       Normal       0.0-0.5       

 

             Baso #       0.0 10       Normal       0.0-0.2       

 

                    FVL/Timi           2021          [facility]

             PDFReport    SEE IMAGE                               

 

             FVC-Pred     5.26 L                                  

 

             FVC-Pre      1.75 L                                  

 

             FVC-%Pred-Pre 33 L                                    

 

             FVC-LLN      4.29 L                                  

 

             Fev1-Pred    4.09 L                                  

 

             Fev1-Pre     1.36 L                                  

 

             Fev1-%Pred-Pre 33 L                                    

 

             Fev1-LLN     3.27 L                                  

 

             Fev6-Pred    5.08 L                                  

 

             Fev6-Pre     1.75 L                                  

 

             Fev6-%Pred-Pre 34 L                                    

 

             Fev6-LLN     4.14 L                                  

 

             Fev1fvc-Pred 78 %                                    

 

             Fev1fvc-Pre  78 %                                    

 

             Xqh7mhg-%Pred-Pre 99 %                                    

 

             Cgx2esf-WHP  68 %                                    

 

             Fev6fvc-Pred 97 %                                    

 

             Fev6fvc-Pre  100 %                                   

 

             Lgr1hmo-%Pred-Pre 103 %                                   

 

             FEFMax-Pred  10.10 L/E/sec                            

 

             FEFMax-Pre   4.56 L/E/sec                            

 

             FEFMax-%Pred-Pre 45 L/E/sec                              

 

             FEFMax-LLN   7.71 L/E/sec                            

 

             Fef2575-Pred 3.62 L/E/sec                            

 

             Fef2575-Pre  1.15 L/E/sec                            

 

             Ygd8846-%Pred-Pre 31 L/E/sec                              

 

             Zac1472-ISZ  1.97 L/E/sec                            

 

             ExpTime-Pre  5.91 sec                                

 

             Fev1fev6-Pred 81 %                                    

 

             Fev1fev6-Pre 78 %                                    

 

             Vqe9ant9-%Pred-Pre 96 %                                    

 

             Bpt0ytn0-DGI 72 %                                    

 

                    Aspergillus Antibodies 2021          [facility] Main Lab

                                        [address] [place] [address]

           [phone] Aspergillus Fumigatus Faith Negative   Normal     Neg:<1

:1    

 

             Aspergillus Flavus Faith Negative     Normal       Neg:<1:1      

 

             Aspergillus Niger Faith Negative     Normal       Neg:<1:1     3

 

                    FVL/[place]           2021          [facility]

             PDFReport    SEE IMAGE                               

 

             FVC-Pred     5.26 L                                  

 

             FVC-Pre      1.73 L                                  

 

             FVC-%Pred-Pre 32 L                                    

 

             FVC-LLN      4.29 L                                  

 

             Fev1-Pred    4.09 L                                  

 

             Fev1-Pre     1.19 L                                  

 

             Fev1-%Pred-Pre 29 L                                    

 

             Fev1-LLN     3.27 L                                  

 

             Fev6-Pred    5.08 L                                  

 

             Fev6-Pre     1.73 L                                  

 

             Fev6-%Pred-Pre 34 L                                    

 

             Fev6-LLN     4.14 L                                  

 

             Fev1fvc-Pred 78 %                                    

 

             Fev1fvc-Pre  69 %                                    

 

             Fbr8vzx-%Pred-Pre 88 %                                    

 

             Key3zgc-DJQ  68 %                                    

 

             Fev6fvc-Pred 97 %                                    

 

             Fev6fvc-Pre  100 %                                   

 

             Ewe7ovz-%Pred-Pre 103 %                                   

 

             FEFMax-Pred  10.10 L/E/sec                            

 

             FEFMax-Pre   3.07 L/E/sec                            

 

             FEFMax-%Pred-Pre 30 L/E/sec                              

 

             FEFMax-LLN   7.71 L/E/sec                            

 

             Fef2575-Pred 3.62 L/E/sec                            

 

             Fef2575-Pre  0.71 L/E/sec                            

 

             Srm3366-%Pred-Pre 19 L/E/sec                              

 

             Ljs4820-UUQ  1.97 L/E/sec                            

 

             ExpTime-Pre  6.66 sec                                

 

             Fev1fev6-Pred 81 %                                    

 

             Fev1fev6-Pre 69 %                                    

 

             Csk6dhc4-%Pred-Pre 85 %                                    

 

             Wxp4ytp5-MNT 72 %                                    







                          1                         .

Levels of Specific IgE       Class  Description of Class

                                        ---------------------------  -----  ----

----------------

< 0.10         0         Negative

                                        0.10 -    0.31         0/I       Equivoc

al/Low

                                        0.32 -    0.55         I         Low

                                        0.56 -    1.40         II        Moderat

e

                                        1.41 -    3.90         III       High

                                        3.91 -   19.00         IV        Very Hi

gh

                                        19.01 -  100.00         V         Very H

igh

>100.00         VI        Very High



 

                          2                         Performed at:  [facility]  [id number]

: Che Hurst MD, Phone:  [phone]



 

                          3                         Performed at:  [facility]  [id number]

61

: Che Hurst MD, Phone:  [phone]









Procedures





                Date            Code            Description     Status

 

                2021      83090           Office/Outpatient Established Mo

d MDM 30-39 Min Completed

 

                2021      20999           Spirometry      Completed

 

                2021      20096           Office/Outpatient Established Mo

d MDM 30-39 Min Completed

 

                2021      21299           Spirometry      Completed

 

                2021      92495           Office/Outpatient Established Lo

w MDM 20-29 Min Completed

 

                2021      88639           Spirometry      Completed







Medical Devices





                                        Description

 

                                        No Information Available







Encounters





           Type       Date       Location   Provider   Dx         Diagnosis

 

             Office Visit 2021  2:00p [facility] Alfonso hunter D.O. 

J45.50                                  Severe persistent asthma, uncomplicated

 

             Office Visit 2021 11:00a [facility] Alfonso hunter D.O. 

J45.50                                  Severe persistent asthma, uncomplicated

 

             Office Visit 2021  1:30p [facility] Alfonso hunter D.O. 

J45.50                                  Severe persistent asthma, uncomplicated







Assessments





                Date            Code            Description     Provider

 

                2021      J45.50          Severe persistent asthma, uncomp

licated Alfonso Alexis D.O.

 

                2021      R05             Cough           Alfonso Alexis D.O.

 

                2021      K21.9           Gastro-esophageal reflux disease

 without esophagitis Alfonso Alexis D.O.

 

                2021      J45.50          Severe persistent asthma, uncomp

licated Alfonso Sears, D.O.

 

                2021      J45.50          Severe persistent asthma, uncomp

licated Alfonso Sears, D.O.

 

                2021      J45.50          Severe persistent asthma, uncomp

licated Alfonso Sears, D.O.







Plan of Treatment

Future Appointment(s):* 2021  3:00 pm - Alfonso Alexis D.O. at [facility] 
  Pulmonary/Thoracic

2021 - MARIANN DuongO.* J45.50 Severe persistent asthma, uncomplicated

* R05 Cough

* K21.9 Gastro-esophageal reflux disease without esophagitis

* * New Medication:* Prednisone 10 mg



* New Labs:* FVL/[place], Ordered: 21

* Theophylline Level, Ordered: 21



* Follow up:* Follow up in November with hood level and timi, me please Needs 
  referral to allergy to consider off label use of dupixent









Functional Status





                Functional Condition Comment         Date            Status

 

                Independent with all ADL's                                 Activ

e

 

                Independent with all IADL's                                 Acti

ve







Mental Status





                Mental Condition Comment         Date            Status

 

                Cognitive ability not impaired                                 A

ctive







Referrals





                Refer to      Reason for Referral Status          Appt Date

 

                Radiology/Procedure [id number]           Closed          2021

## 2021-11-11 NOTE — REP
INDICATION:

DYSPNEA/COUGH.



COMPARISON:

10/30/2020



TECHNIQUE:

An AP sitting portable chest film was obtained.



FINDINGS:

Cardiac lead wires are present.  No definite acute parenchymal opacification is

identified.  The heart is at the upper limits of size.  The pulmonary vasculature is

increased in the upper lobes and early cardiac decompensation cannot be excluded.



IMPRESSION:

No acute pneumonia is identified.  2.  Pulmonary vascular redistribution suggesting

early cardiac decompensation.





<Electronically signed by Farhat Gan > 11/11/21 [id number]

## 2021-11-11 NOTE — CCD
Continuity of Care Document (CCD)

                             Created on: 2021



Sami Claros

External Reference #: [id number]

: 1970

Sex: Male



Demographics





                          Address                   [address] [place] [address]  [address]

 

                          Home Phone                [phone]

 

                          Preferred Language        Unknown

 

                          Marital Status            

 

                          Synagogue Affiliation     Unknown

 

                          Race                      White

 

                          Ethnic Group              Not  or 





Author





                          Author                    Sami ALEXIS D.O.

 

                          Organization              Unknown

 

                          [place]  [address]



 

                          Phone                     [phone]







Care Team Providers





                    Care Team Member Name Role                Phone

 

                    Katy Jones AUTM                [phone]

 

                    [facility] Dept - [facility] AUTM                [phone]

 

                          AUTM                      Unavailable







Problems





                    Active Problems     Provider            Date

 

                    Uncomplicated severe persistent asthma MAYITO Cain    O

nset: 10/28/2020







Social History





                Type            Date            Description     Comments

 

                Birth Sex                       Unknown          

 

                Cigarette Use               Pack Years -  05  

 

                Tobacco Use     Start: 89 End: 94 Patient is a forme

r smoker hx:<1ppd 

since age 19 

 

                Smoking Status  Reviewed: 21 Patient is a former smoker hx

:<1ppd since age 

19 







Allergies, Adverse Reactions, Alerts





                                        Description

 

                                        No Known Drug Allergies







Medications





           Active Medications SIG        Qnty       Indications Ordering Provide

r Date

 

                          Prednisone                     10mg Tablets           

        4 tabs po qd x5d, 

3 tabs po qd x5d, 2 tabs po qd x5d, 1 tab x 5 days, then 1/2 tab po x 5 days 

53tabs              J45.50              Alfonso Alexis D.O.    2021

 

                    Hood-24                     200mg Caps ER 24HR              

     1 by mouth bid      

60caps                                  MARIANN DuongO.    2021

 

                          Prednisone                     10mg Tablets           

        1 by mouth every 

day             30tabs          J45.50          Alfonso Alexis D.O. 2021

 

                          Azithromycin                     500mg Tablets        

           1 tab by mouth 

monday, wed and friday 36tabs          J45.50          MARIANN DuongO. 20

21

 

                                        Asmanex Twisthaler 120 Metered Doses    

                 220mcg/Inh Aerosol     

             1 puff by mouth twice a day 1units       J45.50       DELILAH DuongO. 

2020

 

                          Spiriva Respimat                     2.5mcg/Act Aeroso

l                   2 

puffs every day 4gm                             Julissa Nicole M.D. 

 

                          Zyrtec Allergy                     10mg Capsules      

             1 tab by 

mouth every day 30caps                          Unknown         

 

                          Ventolin HFA                     108(90Base) mcg/Act A

erosol                   2

puffs every 4 hours as needed 18gm                            Julissa morris M.D. 

 

                          Symbicort                     160-4.5mcg/Act Aerosol  

                 2 puff 

twice a day     10.200gm                        Julissa Nicole M.D. 

 

                          Levothyroxine Sodium                     75mcg Tablets

                   1 tab 

by mouth every day                                 Unknown         

 

                          Omeprazole                     40mg Capsules DR       

            1 cap by mouth

every day       30caps                          Unknown         

 

                          Meclizine HCL                     25mg Tablets        

           1 by mouth as 

needed                                          Unknown         

 

                          Singulair                     10mg Tablets            

       1 tab by mouth 

every day                                       Unknown         

 

                                        Albuterol Sulfate                     (2

.5mg/3ML) 0.083% Nebulizer              

             1 vial four times a day as needed 1080ml       J45.50       MARIANN Juárez SeO. 

 

                                        History Medications

 

                          Theophylline ER                     400mg Tablets ER 2

4HR                   1 by

mouth two times a day 180tabs         J45.50          MARIANN DuongO.  - 2021

 

                          Prednisone                     10mg Tablets           

        4 tabs po qd x5d, 

3 tabs po qd x5d, 2 tabs po qd x5d, 1 tab x 5 days, then 1/2 tab po x 5 days 

53tabs              J45.50              MARIANN DuongO.    2021 - 

021







Medications Administered in Office





           Medication SIG        Qnty       Indications Ordering Provider Date

 

                          Covid-19 vaccine, Unspecified                      Inj

ection                    

                                                Unknown         2021







Immunizations





             CPT Code     Status       Date         Vaccine      Lot #

 

             [id number]        Given        2020   Afluria, Quadrivalent, 0.5ml

, NDC# [id number]  







Vital Signs





                Date            Vital           Result          Comment

 

                2021 11:25am BP Systolic     108 mmHg         

 

                    BP Diastolic        60 mmHg              

 

                    Heart Rate          69 /min              

 

                    O2 % BldC Oximetry  95 %                 

 

                    Height              71 inches           5'11"

 

                    Weight              381.00 lb            

 

                    BMI (Body Mass Index) 53.1 kg/m2           

 

                    Ideal Body Weight   172 lb               

 

                    Weight              172.822 kg           

 

                    BSA (Body Surface Area) 2.77 m2              

 

                2021  2:16pm Heart Rate      60 /min          

 

                    O2 % BldC Oximetry  97 %                 

 

                    Height              71 inches           5'11"

 

                    Weight              378.00 lb            

 

                    BMI (Body Mass Index) 52.7 kg/m2           

 

                    Ideal Body Weight   172 lb               

 

                    Weight              171.461 kg           

 

                    BSA (Body Surface Area) 2.76 m2              







Results





        Test    Acquired Date Facility Test    Result  H/L     Range   Note

 

                    FVL[facility]           2021          [facility]

             PDFReport    SEE IMAGE                               

 

             FVC-Pred     5.23 L                                  

 

             FVC-Pre      1.72 L                                  

 

             FVC-%Pred-Pre 32 L                                    

 

             FVC-LLN      4.27 L                                  

 

             Fev1-Pred    4.06 L                                  

 

             Fev1-Pre     1.21 L                                  

 

             Fev1-%Pred-Pre 29 L                                    

 

             Fev1-LLN     3.24 L                                  

 

             Fev6-Pred    5.05 L                                  

 

             Fev6-Pre     1.72 L                                  

 

             Fev6-%Pred-Pre 34 L                                    

 

             Fev6-LLN     4.11 L                                  

 

             Fev1fvc-Pred 78 %                                    

 

             Fev1fvc-Pre  70 %                                    

 

             Kup8dkn-%Pred-Pre 90 %                                    

 

             Kqs6lru-VGC  68 %                                    

 

             Fev6fvc-Pred 97 %                                    

 

             Fev6fvc-Pre  100 %                                   

 

             Sfb2gyd-%Pred-Pre 103 %                                   

 

             FEFMax-Pred  10.05 L/E/sec                            

 

             FEFMax-Pre   4.72 L/E/sec                            

 

             FEFMax-%Pred-Pre 46 L/E/sec                              

 

             FEFMax-LLN   7.66 L/E/sec                            

 

             Fef2575-Pred 3.57 L/E/sec                            

 

             Fef2575-Pre  0.68 L/E/sec                            

 

             Pfm1448-%Pred-Pre 19 L/E/sec                              

 

             Tjo0693-YKU  1.92 L/E/sec                            

 

             ExpTime-Pre  6.02 sec                                

 

             Fev1fev6-Pred 80 %                                    

 

             Fev1fev6-Pre 70 %                                    

 

             Bhf0voh0-%Pred-Pre 87 %                                    

 

             Efb2jrc2-PNP 71 %                                    

 

                    FVL/Timi           2021          [facility]

             PDFReport    SEE IMAGE                               

 

             FVC-Pred     5.26 L                                  

 

             FVC-Pre      1.90 L                                  

 

             FVC-%Pred-Pre 36 L                                    

 

             FVC-LLN      4.29 L                                  

 

             Fev1-Pred    4.09 L                                  

 

             Fev1-Pre     1.41 L                                  

 

             Fev1-%Pred-Pre 34 L                                    

 

             Fev1-LLN     3.27 L                                  

 

             Fev6-Pred    5.08 L                                  

 

             Fev6-Pre     1.87 L                                  

 

             Fev6-%Pred-Pre 36 L                                    

 

             Fev6-LLN     4.14 L                                  

 

             Fev1fvc-Pred 78 %                                    

 

             Fev1fvc-Pre  74 %                                    

 

             Kxy4cql-%Pred-Pre 95 %                                    

 

             Jvz3jgp-OGB  68 %                                    

 

             Fev6fvc-Pred 97 %                                    

 

             Fev6fvc-Pre  98 %                                    

 

             Ivh3dnw-%Pred-Pre 101 %                                   

 

             FEFMax-Pred  10.10 L/E/sec                            

 

             FEFMax-Pre   5.25 L/E/sec                            

 

             FEFMax-%Pred-Pre 51 L/E/sec                              

 

             FEFMax-LLN   7.71 L/E/sec                            

 

             Fef2575-Pred 3.62 L/E/sec                            

 

             Fef2575-Pre  1.01 L/E/sec                            

 

             Vow9603-%Pred-Pre 27 L/E/sec                              

 

             Sgz0165-IIX  1.97 L/E/sec                            

 

             ExpTime-Pre  7.28 sec                                

 

             Fev1fev6-Pred 81 %                                    

 

             Fev1fev6-Pre 76 %                                    

 

             Wjm3iav9-%Pred-Pre 93 %                                    

 

             Qjg8htf0-VXC 72 %                                    

 

                    Rast/Ige, Pulmonary 2021          [facility]

nter Main Lab

                                        [address] [place] [address] [phone] Immunoglobulin E 57.4 IU/mL Normal     <100        

 

                    Rast Zone 1 Region Allergen Panel (Inclu 2021         

 [facility] 

Main Lab

                                        [address] [place] [address] [phone] Class Description (SEE NOTE)  Normal     .          1

 

             Z713-VbK D pteronyssinus <0.10 kU/L   Normal       Class 0       

 

             H854-FzH D farinae Mite <0.10 kU/L   Normal       Class 0       

 

             U283-HvX Cat Epith/Dander 0.56 kU/L    Abnormal     Class II      

 

             L169-FuG Dog Dander 1.53 kU/L    Abnormal     Class III     

 

             D327-PoH Bermuda Grass < 0.10 kU/L  Normal       Class 0       

 

             L566-NrD [place] Bluegrass < 0.10 kU/L  Normal       Class 0      

 

 

             Z805-IyX Bahia Grass < 0.10 kU/L  Normal       Class 0       

 

             Z706-YgD Cockroach, American < 0.10 kU/L  Normal       Class 0     

  

 

             M596-MfW Penicillium chrysogen < 0.10 kU/L  Normal       Class 0   

    

 

             M002 IgE Cladosporium herbaru < 0.10 kU/L  Normal       Class 0    

   

 

             M003 IgE Aspergillus fumigatu < 0.10 kU/L  Normal       Class 0    

   

 

             C981-BlX Mucor racemosus < 0.10 kU/L  Normal       Class 0       

 

             X983-QzN Alternaria alternata < 0.10 kU/L  Normal       Class 0    

   

 

             C383-IgI Stemphylium Herbarum < 0.10 kU/L  Normal       Class 0    

   

 

             N176-RsH Common Silver Birch < 0.10 kU/L  Normal       Class 0     

  

 

             V172-JdJ Oak, White < 0.10 kU/L  Normal       Class 0       

 

             G033-QxZ Elm, American < 0.10 kU/L  Normal       Class 0       

 

             L087-RoK Deonte, White < 0.10 kU/L  Normal       Class 0       

 

             O202-NmO Maple/Box Elder < 0.10 kU/L  Normal       Class 0       

 

             E274-LgZ Hazelnut Tree < 0.10 kU/L  Normal       Class 0       

 

             P842-YoV Hickory, White < 0.10 kU/L  Normal       Class 0       

 

             S246-OaJ White Jacobson < 0.10 kU/L  Normal       Class 0       

 

             Y055-HhH [place], Mountain < 0.10 kU/L  Normal       Class 0       

 

             C394-FwQ Ragweed, Short < 0.10 kU/L  Normal       Class 0       

 

             I082-DmF Mugwort < 0.10 kU/L  Normal       Class 0       

 

             U820-YiK Plantain, English < 0.10 kU/L  Normal       Class 0       

 

             S171-ImU Pigweed, Rough < 0.10 kU/L  Normal       Class 0       

 

             J686-KdB Sheep Sorrel < 0.10 kU/L  Normal       Class 0       

 

             C380-HqF Nettle < 0.10 kU/L  Normal       Class 0      2

 

                    CBC With Differential 2021          [facility] Main Lab

                                        [address] [place] [address] [phone] White Blood Count 4.8 10     Normal     4.0-10.0    

 

             Red Blood Count 4.61 10      Normal       4.30-6.10     

 

             Hemoglobin   13.2 g/dL    Low          13.5-17.5     

 

             Hematocrit   42.2 %       Normal       42.0-52.0     

 

             Mean Corpuscular Volume 91.5 fl      Normal       80.0-96.0     

 

             Mean Corpuscular Hemoglobin 28.6 pg      Normal       27.0-33.0    

 

 

             Mean Corpuscular HGB Conc 31.3 g/dL    Low          32.0-36.5     

 

             Red Cell Distribution Width 14.2 %       Normal       11.5-14.5    

 

 

             Platelet Count, Automated 255 10       Normal       150-450       

 

             Neutrophils % 57.4 %       Normal       36.0-66.0     

 

             Lymph %      30.0 %       Normal       24.0-44.0     

 

             Mono %       8.7 %        High         2.0-8.0       

 

             Eos %        3.1 %        High         0.0-3.0       

 

             Baso %       0.6 %        Normal       0.0-1.0       

 

             Immature Granulocyte % 0.2 %        Normal       0-3.0         

 

             Nucleated Red Blood Cell % 0.0 %        Normal       0-0           

 

             Neutrophils # 2.8 10       Normal       1.5-8.5       

 

             Lymph #      1.5 10       Normal       1.5-5.0       

 

             Mono #       0.4 10       Normal       0.0-0.8       

 

             Eos #        0.2 10       Normal       0.0-0.5       

 

             Baso #       0.0 10       Normal       0.0-0.2       

 

                    FVL/Timi           2021          [facility]

             PDFReport    SEE IMAGE                               

 

             FVC-Pred     5.26 L                                  

 

             FVC-Pre      1.75 L                                  

 

             FVC-%Pred-Pre 33 L                                    

 

             FVC-LLN      4.29 L                                  

 

             Fev1-Pred    4.09 L                                  

 

             Fev1-Pre     1.36 L                                  

 

             Fev1-%Pred-Pre 33 L                                    

 

             Fev1-LLN     3.27 L                                  

 

             Fev6-Pred    5.08 L                                  

 

             Fev6-Pre     1.75 L                                  

 

             Fev6-%Pred-Pre 34 L                                    

 

             Fev6-LLN     4.14 L                                  

 

             Fev1fvc-Pred 78 %                                    

 

             Fev1fvc-Pre  78 %                                    

 

             Voc4dmu-%Pred-Pre 99 %                                    

 

             Mje5xbk-XSY  68 %                                    

 

             Fev6fvc-Pred 97 %                                    

 

             Fev6fvc-Pre  100 %                                   

 

             Itp5wva-%Pred-Pre 103 %                                   

 

             FEFMax-Pred  10.10 L/E/sec                            

 

             FEFMax-Pre   4.56 L/E/sec                            

 

             FEFMax-%Pred-Pre 45 L/E/sec                              

 

             FEFMax-LLN   7.71 L/E/sec                            

 

             Fef2575-Pred 3.62 L/E/sec                            

 

             Fef2575-Pre  1.15 L/E/sec                            

 

             Jgk3518-%Pred-Pre 31 L/E/sec                              

 

             Qxv6656-EGU  1.97 L/E/sec                            

 

             ExpTime-Pre  5.91 sec                                

 

             Fev1fev6-Pred 81 %                                    

 

             Fev1fev6-Pre 78 %                                    

 

             Zic7hqd0-%Pred-Pre 96 %                                    

 

             Tyk1wqj6-JMU 72 %                                    

 

                    Aspergillus Antibodies 2021          [facility] Main Lab

                                        [address] [place] [address]

           [phone] Aspergillus Fumigatus Faith Negative   Normal     Neg:<1

:1    

 

             Aspergillus Flavus Faith Negative     Normal       Neg:<1:1      

 

             Aspergillus Niger Faith Negative     Normal       Neg:<1:1     3

 

                    FVL/[facility]           2021          [facility]

             PDFReport    SEE IMAGE                               

 

             FVC-Pred     5.26 L                                  

 

             FVC-Pre      1.73 L                                  

 

             FVC-%Pred-Pre 32 L                                    

 

             FVC-LLN      4.29 L                                  

 

             Fev1-Pred    4.09 L                                  

 

             Fev1-Pre     1.19 L                                  

 

             Fev1-%Pred-Pre 29 L                                    

 

             Fev1-LLN     3.27 L                                  

 

             Fev6-Pred    5.08 L                                  

 

             Fev6-Pre     1.73 L                                  

 

             Fev6-%Pred-Pre 34 L                                    

 

             Fev6-LLN     4.14 L                                  

 

             Fev1fvc-Pred 78 %                                    

 

             Fev1fvc-Pre  69 %                                    

 

             Rzj2caq-%Pred-Pre 88 %                                    

 

             Wfx5jwj-UKK  68 %                                    

 

             Fev6fvc-Pred 97 %                                    

 

             Fev6fvc-Pre  100 %                                   

 

             Tnb1rbc-%Pred-Pre 103 %                                   

 

             FEFMax-Pred  10.10 L/E/sec                            

 

             FEFMax-Pre   3.07 L/E/sec                            

 

             FEFMax-%Pred-Pre 30 L/E/sec                              

 

             FEFMax-LLN   7.71 L/E/sec                            

 

             Fef2575-Pred 3.62 L/E/sec                            

 

             Fef2575-Pre  0.71 L/E/sec                            

 

             Pxe4439-%Pred-Pre 19 L/E/sec                              

 

             Wug8040-XAP  1.97 L/E/sec                            

 

             ExpTime-Pre  6.66 sec                                

 

             Fev1fev6-Pred 81 %                                    

 

             Fev1fev6-Pre 69 %                                    

 

             Wfv5uaj3-%Pred-Pre 85 %                                    

 

             Pak2iwm6-GYQ 72 %                                    







                          1                         .

Levels of Specific IgE       Class  Description of Class

                                        ---------------------------  -----  ----

----------------

< 0.10         0         Negative

                                        0.10 -    0.31         0/I       Equivoc

al/Low

                                        0.32 -    0.55         I         Low

                                        0.56 -    1.40         II        Moderat

e

                                        1.41 -    3.90         III       High

                                        3.91 -   19.00         IV        Very Hi

gh

                                        19.01 -  100.00         V         Very H

igh

>100.00         VI        Very High



 

                          2                         Performed at:  [facility]  [id number]

61

: Che Hurst MD, Phone:  [phone]



 

                          3                         Performed at:  [facility]  [id number]

61

: Che Hurst MD, Phone:  [phone]









Procedures





                Date            Code            Description     Status

 

                2021      17321           Office/Outpatient Established Mo

d MDM 30-39 Min Completed

 

                2021      23092           Spirometry      Completed

 

                2021      67254           Office/Outpatient Established Mo

d MDM 30-39 Min Completed

 

                2021      42812           Spirometry      Completed

 

                2021      39393           Office/Outpatient Established Mo

d MDM 30-39 Min Completed

 

                2021      15038           Spirometry      Completed

 

                2021      53933           Office/Outpatient Established Lo

w MDM 20-29 Min Completed

 

                2021      15675           Spirometry      Completed







[employer]





                                        Description

 

                                        No Information Available







Encounters





           Type       Date       Location   Provider   Dx         Diagnosis

 

             Office Visit 2021 11:30a [facility] Alfonso hunter D.O. 

J45.50                                  Severe persistent asthma, uncomplicated

 

                          R05                       Cough

 

                          K21.9                     Gastro-esophageal reflux dis

ease without esophagitis

 

             Office Visit 2021  2:00p [facility] Alfonso hunter D.O. 

J45.50                                  Severe persistent asthma, uncomplicated

 

             Office Visit 2021 11:00a [facility] Alfonso hunter D.O. 

J45.50                                  Severe persistent asthma, uncomplicated

 

             Office Visit 2021  1:30p [facility] Alfonso hunter D.O. 

J45.50                                  Severe persistent asthma, uncomplicated







Assessments





                Date            Code            Description     Provider

 

                2021      J45.50          Severe persistent asthma, uncomp

licated Alfonso Sears, D.O.

 

                2021      R05             Cough           Alfonso Sears, D.O.

 

                2021      K21.9           Gastro-esophageal reflux disease

 without esophagitis Alfonso 

Sears, D.O.

 

                2021      J45.50          Severe persistent asthma, uncomp

licated Alfosno Sears, D.O.

 

                2021      J45.50          Severe persistent asthma, uncomp

licated Alfonso Sears, D.O.

 

                2021      J45.50          Severe persistent asthma, uncomp

licated Alfonso Sears, D.O.







Plan of Treatment

Future Appointment(s):* 2021  3:00 pm - Alfonso Alexis D.O. at [facility] 
  Pulmonary/Thoracic

2021 - Alfonso Sears, D.O.* J45.50 Severe persistent asthma, uncomplicated

* R05 Cough

* K21.9 Gastro-esophageal reflux disease without esophagitis

* * New Medication:* Prednisone 10 mg



* New Labs:* FVL/Timi, Ordered: 21

* Theophylline Level, Ordered: 21



* Follow up:* Follow up in November with Hood level and spirometry, me please. 
  Needs referral to allergy to consider off label use of Dupixent.









Functional Status





                Functional Condition Comment         Date            Status

 

                Independent with all ADL's                                 Activ

e

 

                Independent with all IADL's                                 Acti

ve







Mental Status





                Mental Condition Comment         Date            Status

 

                Cognitive ability not impaired                                 A

ctive







Referrals





                Refer to      Reason for Referral Status          Appt Date

 

                Radiology/Procedure 76561           Closed          2021

## 2021-11-12 NOTE — ECGEPIP
[facility] - ED

                                       

                                       Test Date:    2021

Pat Name:     TANIKA GUSMAN              Department:   

Patient ID:   [id number]                 Room:         -

Gender:       Male                     Technician:   AB

:          1970               Requested By: Lilian Wheeler 

Order Number: [id number]     Reading MD:   Panchito Brown

                                 Measurements

Intervals                              Axis          

Rate:         62                       P:            35

NY:           142                      QRS:          -19

QRSD:         98                       T:            27

QT:           432                                    

QTc:          438                                    

                           Interpretive Statements

Normal sinus rhythm

POOR R WAVE PROGRESSION

SIMILAR TO 17

Electronically Signed on 2021 7:53:12 EST by Panchito Brown

## 2022-09-20 ENCOUNTER — HOSPITAL ENCOUNTER (EMERGENCY)
Dept: HOSPITAL 53 - M ED | Age: 52
Discharge: HOME | End: 2022-09-20
Payer: OTHER GOVERNMENT

## 2022-09-20 VITALS — SYSTOLIC BLOOD PRESSURE: 138 MMHG | DIASTOLIC BLOOD PRESSURE: 84 MMHG

## 2022-09-20 VITALS — BODY MASS INDEX: 42.66 KG/M2 | WEIGHT: 315 LBS | HEIGHT: 72 IN

## 2022-09-20 DIAGNOSIS — G47.33: ICD-10-CM

## 2022-09-20 DIAGNOSIS — Z79.890: ICD-10-CM

## 2022-09-20 DIAGNOSIS — J45.901: Primary | ICD-10-CM

## 2022-09-20 DIAGNOSIS — Z98.84: ICD-10-CM

## 2022-09-20 DIAGNOSIS — E66.9: ICD-10-CM

## 2022-09-20 DIAGNOSIS — Z79.899: ICD-10-CM

## 2022-09-20 LAB — RSV RNA NPH QL NAA+PROBE: NEGATIVE

## 2022-09-20 PROCEDURE — 96374 THER/PROPH/DIAG INJ IV PUSH: CPT

## 2022-09-20 PROCEDURE — 71046 X-RAY EXAM CHEST 2 VIEWS: CPT

## 2022-09-20 PROCEDURE — 87631 RESP VIRUS 3-5 TARGETS: CPT

## 2022-09-20 PROCEDURE — 94640 AIRWAY INHALATION TREATMENT: CPT

## 2022-09-20 PROCEDURE — 99284 EMERGENCY DEPT VISIT MOD MDM: CPT

## 2022-09-20 RX ADMIN — IPRATROPIUM BROMIDE AND ALBUTEROL SULFATE SCH ML: .5; 3 SOLUTION RESPIRATORY (INHALATION) at 16:31

## 2022-09-20 RX ADMIN — IPRATROPIUM BROMIDE AND ALBUTEROL SULFATE SCH ML: .5; 3 SOLUTION RESPIRATORY (INHALATION) at 17:23

## 2022-09-20 RX ADMIN — IPRATROPIUM BROMIDE AND ALBUTEROL SULFATE SCH ML: .5; 3 SOLUTION RESPIRATORY (INHALATION) at 17:54

## 2022-10-04 ENCOUNTER — HOSPITAL ENCOUNTER (EMERGENCY)
Dept: HOSPITAL 53 - M ED | Age: 52
Discharge: HOME | End: 2022-10-04
Payer: MEDICARE

## 2022-10-04 VITALS — HEIGHT: 72 IN | BODY MASS INDEX: 42.66 KG/M2 | WEIGHT: 315 LBS

## 2022-10-04 VITALS — DIASTOLIC BLOOD PRESSURE: 71 MMHG | SYSTOLIC BLOOD PRESSURE: 135 MMHG

## 2022-10-04 DIAGNOSIS — R79.1: ICD-10-CM

## 2022-10-04 DIAGNOSIS — Z86.73: ICD-10-CM

## 2022-10-04 DIAGNOSIS — Z79.890: ICD-10-CM

## 2022-10-04 DIAGNOSIS — Z79.899: ICD-10-CM

## 2022-10-04 DIAGNOSIS — Z87.891: ICD-10-CM

## 2022-10-04 DIAGNOSIS — J45.901: Primary | ICD-10-CM

## 2022-10-04 DIAGNOSIS — Z98.84: ICD-10-CM

## 2022-10-04 DIAGNOSIS — M54.9: ICD-10-CM

## 2022-10-04 LAB
BASOPHILS # BLD AUTO: 0 10^3/UL (ref 0–0.2)
BASOPHILS NFR BLD AUTO: 0.2 % (ref 0–1)
EOSINOPHIL # BLD AUTO: 0 10^3/UL (ref 0–0.5)
EOSINOPHIL NFR BLD AUTO: 0.1 % (ref 0–3)
HCT VFR BLD AUTO: 41.7 % (ref 42–52)
HGB BLD-MCNC: 12.8 G/DL (ref 13.5–17.5)
LYMPHOCYTES # BLD AUTO: 0.8 10^3/UL (ref 1.5–5)
LYMPHOCYTES NFR BLD AUTO: 6.8 % (ref 24–44)
MCH RBC QN AUTO: 26.3 PG (ref 27–33)
MCHC RBC AUTO-ENTMCNC: 30.7 G/DL (ref 32–36.5)
MCV RBC AUTO: 85.6 FL (ref 80–96)
MONOCYTES # BLD AUTO: 0.3 10^3/UL (ref 0–0.8)
MONOCYTES NFR BLD AUTO: 2.6 % (ref 2–8)
NEUTROPHILS # BLD AUTO: 10.5 10^3/UL (ref 1.5–8.5)
NEUTROPHILS NFR BLD AUTO: 89.9 % (ref 36–66)
PLATELET # BLD AUTO: 314 10^3/UL (ref 150–450)
RBC # BLD AUTO: 4.87 10^6/UL (ref 4.3–6.1)
RSV RNA NPH QL NAA+PROBE: NEGATIVE
WBC # BLD AUTO: 11.7 10^3/UL (ref 4–10)

## 2022-10-04 PROCEDURE — 71046 X-RAY EXAM CHEST 2 VIEWS: CPT

## 2022-10-04 PROCEDURE — 93005 ELECTROCARDIOGRAM TRACING: CPT

## 2022-10-04 PROCEDURE — 85025 COMPLETE CBC W/AUTO DIFF WBC: CPT

## 2022-10-04 PROCEDURE — 80047 BASIC METABLC PNL IONIZED CA: CPT

## 2022-10-04 PROCEDURE — 71275 CT ANGIOGRAPHY CHEST: CPT

## 2022-10-04 PROCEDURE — 99284 EMERGENCY DEPT VISIT MOD MDM: CPT

## 2022-10-04 PROCEDURE — 87631 RESP VIRUS 3-5 TARGETS: CPT

## 2022-10-04 PROCEDURE — 85379 FIBRIN DEGRADATION QUANT: CPT

## 2022-10-04 PROCEDURE — 94640 AIRWAY INHALATION TREATMENT: CPT

## 2022-10-04 PROCEDURE — 83880 ASSAY OF NATRIURETIC PEPTIDE: CPT

## 2024-01-08 NOTE — REP
CHEST:

 

Two views.

 

There is no evidence of acute infiltrate.

 

No pleural effusion is seen.

 

The heart is normal in size.

 

The mediastinal silhouette is unremarkable.

 

The visualized osseous structures are intact.

 

IMPRESSION:

 

No acute pulmonary disease.

 

 

Signed by

Quoc Contreras MD 12/18/2017 05:55 P No